# Patient Record
Sex: MALE | Race: WHITE | NOT HISPANIC OR LATINO | Employment: PART TIME | URBAN - METROPOLITAN AREA
[De-identification: names, ages, dates, MRNs, and addresses within clinical notes are randomized per-mention and may not be internally consistent; named-entity substitution may affect disease eponyms.]

---

## 2017-02-14 DIAGNOSIS — F41.1 GENERALIZED ANXIETY DISORDER: ICD-10-CM

## 2017-02-14 DIAGNOSIS — F40.10 SOCIAL PHOBIA: ICD-10-CM

## 2017-02-14 DIAGNOSIS — F41.1 ANXIETY STATE: ICD-10-CM

## 2017-02-15 NOTE — TELEPHONE ENCOUNTER
venlafaxine  Last Written Prescription Date: 9/1/2015  Last Fill Quantity: 180, # refills: 3  Last Office Visit with [facility], [facility] or [facility] prescribing provider: 9/1/2015        BP Readings from Last 3 Encounters:   09/01/15 134/90   01/11/13 104/70   01/10/13 130/84     Pulse: (for Fetzima)  Creatinine   Date Value Ref Range Status   12/06/2012 1.02 0.66 - 1.25 mg/dL Final   ]    Last PHQ-9 score on record=   PHQ-9 SCORE 9/1/2015   Total Score -   Total Score 4

## 2017-02-16 NOTE — TELEPHONE ENCOUNTER
Over a year since last seen in clinic. Patient should scheduled follow up appointment. Break in medication.  Nyla Hernández RN

## 2017-02-24 ENCOUNTER — OFFICE VISIT (OUTPATIENT)
Dept: FAMILY MEDICINE | Facility: CLINIC | Age: 42
End: 2017-02-24

## 2017-02-24 VITALS
OXYGEN SATURATION: 97 % | HEIGHT: 73 IN | SYSTOLIC BLOOD PRESSURE: 125 MMHG | WEIGHT: 315 LBS | TEMPERATURE: 97.2 F | DIASTOLIC BLOOD PRESSURE: 89 MMHG | RESPIRATION RATE: 20 BRPM | BODY MASS INDEX: 41.75 KG/M2 | HEART RATE: 88 BPM

## 2017-02-24 DIAGNOSIS — F40.10 SOCIAL PHOBIA: ICD-10-CM

## 2017-02-24 DIAGNOSIS — K21.9 GASTROESOPHAGEAL REFLUX DISEASE WITHOUT ESOPHAGITIS: Primary | ICD-10-CM

## 2017-02-24 DIAGNOSIS — F41.1 GENERALIZED ANXIETY DISORDER: ICD-10-CM

## 2017-02-24 PROCEDURE — 99213 OFFICE O/P EST LOW 20 MIN: CPT | Performed by: FAMILY MEDICINE

## 2017-02-24 RX ORDER — VENLAFAXINE 75 MG/1
TABLET ORAL
Qty: 180 TABLET | Refills: 0 | OUTPATIENT
Start: 2017-02-24

## 2017-02-24 RX ORDER — VENLAFAXINE 75 MG/1
75 TABLET ORAL 2 TIMES DAILY WITH MEALS
Qty: 180 TABLET | Refills: 1 | Status: SHIPPED | OUTPATIENT
Start: 2017-02-24 | End: 2018-02-06

## 2017-02-24 ASSESSMENT — ANXIETY QUESTIONNAIRES
7. FEELING AFRAID AS IF SOMETHING AWFUL MIGHT HAPPEN: NOT AT ALL
3. WORRYING TOO MUCH ABOUT DIFFERENT THINGS: NOT AT ALL
IF YOU CHECKED OFF ANY PROBLEMS ON THIS QUESTIONNAIRE, HOW DIFFICULT HAVE THESE PROBLEMS MADE IT FOR YOU TO DO YOUR WORK, TAKE CARE OF THINGS AT HOME, OR GET ALONG WITH OTHER PEOPLE: NOT DIFFICULT AT ALL
2. NOT BEING ABLE TO STOP OR CONTROL WORRYING: NOT AT ALL
1. FEELING NERVOUS, ANXIOUS, OR ON EDGE: NOT AT ALL
5. BEING SO RESTLESS THAT IT IS HARD TO SIT STILL: NOT AT ALL
6. BECOMING EASILY ANNOYED OR IRRITABLE: NOT AT ALL
GAD7 TOTAL SCORE: 0

## 2017-02-24 ASSESSMENT — PATIENT HEALTH QUESTIONNAIRE - PHQ9: 5. POOR APPETITE OR OVEREATING: NOT AT ALL

## 2017-02-24 NOTE — PROGRESS NOTES
SUBJECTIVE:                                                    Kev Gilbert is a 41 year old male who presents to clinic today for the following health issues:        Depression and Anxiety Follow-Up    Status since last visit: Improved     Other associated symptoms:None    Complicating factors:     Significant life event: Yes-  Loss of few friends      Current substance abuse: None    PHQ-9 SCORE 12/6/2012 9/1/2015 2/24/2017   Total Score 9 - -   Total Score - 4 2     MATTY-7 SCORE 12/6/2012 9/1/2015 2/24/2017   Total Score 4 - -   Total Score - 5 0        PHQ-9  English      PHQ-9   Any Language     GAD7       Amount of exercise or physical activity: None    Problems taking medications regularly: No    Medication side effects: none  Diet: regular (no restrictions)        Problem list and histories reviewed & adjusted, as indicated.  Additional history: as documented    Patient Active Problem List   Diagnosis     Anxiety state     GERD (gastroesophageal reflux disease)     CARDIOVASCULAR SCREENING; LDL GOAL LESS THAN 160     Smoker     Obesity     Generalized anxiety disorder     Social phobia     Past Surgical History   Procedure Laterality Date     Surgical history of -   7/00     Upper GI       Social History   Substance Use Topics     Smoking status: Current Every Day Smoker     Packs/day: 1.00     Years: 20.00     Types: Cigarettes     Smokeless tobacco: Never Used     Alcohol use No     Family History   Problem Relation Age of Onset     Neurologic Disorder Paternal Grandfather          Current Outpatient Prescriptions   Medication Sig Dispense Refill     venlafaxine (EFFEXOR) 75 MG tablet Take 1 tablet (75 mg) by mouth 2 times daily (with meals) 180 tablet 1     PRILOSEC 20 MG OR CPDR 1 TAB PO QD  otc 30 1     [DISCONTINUED] venlafaxine (EFFEXOR) 75 MG tablet Take 1 tablet (75 mg) by mouth 2 times daily (with meals) 180 tablet 3     Problem list, Medication list, Allergies, and Medical/Social/Surgical  "histories reviewed in EPIC and updated as appropriate.    ROS:  C: NEGATIVE for fever, chills, change in weight  CV: NEGATIVE for chest pain, palpitations or peripheral edema  NEURO: POSITIVE for dizziness/lightheadedness    OBJECTIVE:                                                    /89 (Cuff Size: Adult Large)  Pulse 88  Temp 97.2  F (36.2  C) (Tympanic)  Resp 20  Ht 6' 1\" (1.854 m)  Wt (!) 330 lb (149.7 kg)  SpO2 97%  BMI 43.54 kg/m2  Body mass index is 43.54 kg/(m^2).   GENERAL: healthy, alert, well nourished, well hydrated, no distress  NECK: no tenderness, no adenopathy, no asymmetry, no masses, no stiffness; thyroid- normal to palpation  RESP: lungs clear to auscultation - no rales, no rhonchi, no wheezes  CV: regular rates and rhythm, normal S1 S2, no S3 or S4 and no murmur, no click or rub -  PSYCH: Alert and oriented times 3; speech- coherent , normal rate and volume; able to articulate logical thoughts, able to abstract reason, no tangential thoughts, no hallucinations or delusions, affect- normal         ASSESSMENT/PLAN:                                                        ICD-10-CM    1. Gastroesophageal reflux disease without esophagitis K21.9    2. Social phobia F40.10 venlafaxine (EFFEXOR) 75 MG tablet   3. Generalized anxiety disorder F41.1        Patient has stable symptoms, no side effects. He has sendentry life style with smoking addiction. Dicussed healthy life style. Will refill the medication and he is to follow up in 6 months for physical and labs.  Effexor is refilled.    Jameson Mathews MD  [facility]  "

## 2017-02-24 NOTE — MR AVS SNAPSHOT
"              After Visit Summary   2017    Kev Gilbert    MRN: [id number]           Patient Information     Date Of Birth          1975        Visit Information        Provider Department      2017 3:00 PM Jameson Mathews MD [facility] [place]        Today's Diagnoses     Gastroesophageal reflux disease without esophagitis    -  1    Social phobia        Generalized anxiety disorder           Follow-ups after your visit        Who to contact     If you have questions or need follow up information about today's clinic visit or your schedule please contact [facility] [place] directly at [phone].  Normal or non-critical lab and imaging results will be communicated to you by [employer], letter or phone within 4 business days after the clinic has received the results. If you do not hear from us within 7 days, please contact the clinic through [employer] or phone. If you have a critical or abnormal lab result, we will notify you by phone as soon as possible.  Submit refill requests through [employer] or call your pharmacy and they will forward the refill request to us. Please allow 3 business days for your refill to be completed.          Additional Information About Your Visit        MyChart Information     [employer] lets you send messages to your doctor, view your test results, renew your prescriptions, schedule appointments and more. To sign up, go to [email or web address] . Click on \"Log in\" on the left side of the screen, which will take you to the Welcome page. Then click on \"Sign up Now\" on the right side of the page.     You will be asked to enter the access code listed below, as well as some personal information. Please follow the directions to create your username and password.     Your access code is: [email or web address]  Expires: 2017  3:41 PM     Your access code will  in 90 days. If you need help or a new code, please call your [facility] or [phone].        Care " "EveryWhere ID     This is your Care EveryWhere ID. This could be used by other organizations to access your [place] medical records  [id number]        Your Vitals Were     Pulse Temperature Respirations Height Pulse Oximetry BMI (Body Mass Index)    88 97.2  F (36.2  C) (Tympanic) 20 6' 1\" (1.854 m) 97% 43.54 kg/m2       Blood Pressure from Last 3 Encounters:   02/24/17 125/89   09/01/15 134/90   01/11/13 104/70    Weight from Last 3 Encounters:   02/24/17 (!) 330 lb (149.7 kg)   09/01/15 300 lb (136.1 kg)   01/10/13 291 lb (132 kg)              Today, you had the following     No orders found for display         Where to get your medicines      These medications were sent to [id number] [place] [address] [facility]  [address] [address]     Phone:  [phone]     venlafaxine 75 MG tablet          Primary Care Provider Office Phone # Fax #    Jameson Mathews [phone]       [facility] [address] [place] [address] [address]  [place] [address]        Thank you!     Thank you for choosing [facility]  for your care. Our goal is always to provide you with excellent care. Hearing back from our patients is one way we can continue to improve our services. Please take a few minutes to complete the written survey that you may receive in the mail after your visit with us. Thank you!             Your Updated Medication List - Protect others around you: Learn how to safely use, store and throw away your medicines at www.disposemymeds.org.          This list is accurate as of: 2/24/17  3:41 PM.  Always use your most recent med list.                   Brand Name Dispense Instructions for use    priLOSEC 20 MG CR capsule   Generic drug:  omeprazole     30    1 TAB PO QD  otc       venlafaxine 75 MG tablet    EFFEXOR    180 tablet    Take 1 tablet (75 mg) by mouth 2 times daily (with meals)         "

## 2017-02-25 ASSESSMENT — PATIENT HEALTH QUESTIONNAIRE - PHQ9: SUM OF ALL RESPONSES TO PHQ QUESTIONS 1-9: 2

## 2017-02-25 ASSESSMENT — ANXIETY QUESTIONNAIRES: GAD7 TOTAL SCORE: 0

## 2018-02-06 ENCOUNTER — OFFICE VISIT (OUTPATIENT)
Dept: FAMILY MEDICINE | Facility: CLINIC | Age: 43
End: 2018-02-06

## 2018-02-06 VITALS
TEMPERATURE: 97.2 F | BODY MASS INDEX: 44.33 KG/M2 | DIASTOLIC BLOOD PRESSURE: 92 MMHG | HEART RATE: 92 BPM | WEIGHT: 315 LBS | SYSTOLIC BLOOD PRESSURE: 138 MMHG

## 2018-02-06 DIAGNOSIS — F41.1 GENERALIZED ANXIETY DISORDER: Primary | ICD-10-CM

## 2018-02-06 DIAGNOSIS — F40.10 SOCIAL PHOBIA: ICD-10-CM

## 2018-02-06 DIAGNOSIS — R03.0 ELEVATED BLOOD PRESSURE READING WITHOUT DIAGNOSIS OF HYPERTENSION: ICD-10-CM

## 2018-02-06 PROCEDURE — 99213 OFFICE O/P EST LOW 20 MIN: CPT | Performed by: FAMILY MEDICINE

## 2018-02-06 RX ORDER — VENLAFAXINE 75 MG/1
75 TABLET ORAL 2 TIMES DAILY WITH MEALS
Qty: 180 TABLET | Refills: 1 | Status: SHIPPED | OUTPATIENT
Start: 2018-02-06 | End: 2019-02-05

## 2018-02-06 NOTE — NURSING NOTE
"Chief Complaint   Patient presents with     Recheck Medication       Initial BP (!) 146/94  Pulse 92  Temp 97.2  F (36.2  C) (Tympanic)  Wt (!) 336 lb (152.4 kg)  BMI 44.33 kg/m2 Estimated body mass index is 44.33 kg/(m^2) as calculated from the following:    Height as of 2/24/17: 6' 1\" (1.854 m).    Weight as of this encounter: 336 lb (152.4 kg).  Medication Reconciliation: complete    Current Outpatient Prescriptions   Medication Sig Dispense Refill     venlafaxine (EFFEXOR) 75 MG tablet Take 1 tablet (75 mg) by mouth 2 times daily (with meals) 180 tablet 1     PRILOSEC 20 MG OR CPDR 1 TAB PO QD  otc 30 1       [facility], [facility]  "

## 2018-02-06 NOTE — MR AVS SNAPSHOT
After Visit Summary   2/6/2018    Kev Gilbert    MRN: [id number]           Patient Information     Date Of Birth          1975        Visit Information        Provider Department      2/6/2018 3:40 PM Jameson Mathews MD [facility]        Today's Diagnoses     Generalized anxiety disorder    -  1    Social phobia        Elevated blood pressure reading without diagnosis of hypertension           Follow-ups after your visit        Follow-up notes from your care team     Return in about 2 months (around 4/6/2018) for BP check morning appointment. alos physcial.      Your next 10 appointments already scheduled     Apr 18, 2018  9:00 AM CDT   [facility] Visit with [facility]   [facility]    [address] [place] [id number] [phone]           Bring a current list of meds and any records pertaining to this visit. For Physicals, please bring immunization records and any forms needing to be filled out. Please arrive 10 minutes early to complete paperwork.              Who to contact     If you have questions or need follow up information about today's clinic visit or your schedule please contact [facility] directly at [phone].  Normal or non-critical lab and imaging results will be communicated to you by [email or web address], letter or phone within 4 business days after the clinic has received the results. If you do not hear from us within 7 days, please contact the clinic through [email or web address] or phone. If you have a critical or abnormal lab result, we will notify you by phone as soon as possible.  Submit refill requests through [employer] or call your pharmacy and they will forward the refill request to us. Please allow 3 business days for your refill to be completed.          Additional Information About Your Visit        [email or web address] Information     [employer] lets you send messages to your doctor, view your test  "results, renew your prescriptions, schedule appointments and more. To sign up, go to [email or web address] . Click on \"Log in\" on the left side of the screen, which will take you to the Welcome page. Then click on \"Sign up Now\" on the right side of the page.     You will be asked to enter the access code listed below, as well as some personal information. Please follow the directions to create your username and password.     Your access code is: [email or web address]  Expires: 2018  4:00 PM     Your access code will  in 90 days. If you need help or a new code, please call your [place] clinic or [phone].        Care EveryWhere ID     This is your Care EveryWhere ID. This could be used by other organizations to access your [place] medical records  [id number]        Your Vitals Were     Pulse Temperature BMI (Body Mass Index)             92 97.2  F (36.2  C) (Tympanic) 44.33 kg/m2          Blood Pressure from Last 3 Encounters:   18 (!) 138/92   17 125/89   09/01/15 134/90    Weight from Last 3 Encounters:   18 (!) 336 lb (152.4 kg)   17 (!) 330 lb (149.7 kg)   09/01/15 300 lb (136.1 kg)              Today, you had the following     No orders found for display         Where to get your medicines      These medications were sent to [id number]  [place]  [address] [facility]  [address] [address]     Phone:  [phone]     venlafaxine 75 MG tablet          Primary Care Provider Office Phone # Fax #    Jameson Mathews [phone]       [address]  [place] [address]        Equal Access to Services     [facility] : Tiffanie hall Soheraclio, waboda jimbo, josee marie. So [facility] [phone].    ATENCIÓN: Si habla español, tiene a hernandez disposición servicios gratuitos de asistencia lingüística. Llame al [phone].    We comply with " applicable federal civil rights laws and [place] laws. We do not discriminate on the basis of race, color, national origin, age, disability, sex, sexual orientation, or gender identity.            Thank you!     Thank you for choosing [facility] [place]  for your care. Our goal is always to provide you with excellent care. Hearing back from our patients is one way we can continue to improve our services. Please take a few minutes to complete the written survey that you may receive in the mail after your visit with us. Thank you!             Your Updated Medication List - Protect others around you: Learn how to safely use, store and throw away your medicines at www.disposemymeds.org.          This list is accurate as of 2/6/18  4:00 PM.  Always use your most recent med list.                   Brand Name Dispense Instructions for use Diagnosis    priLOSEC 20 MG CR capsule   Generic drug:  omeprazole     30    1 TAB PO QD  otc        venlafaxine 75 MG tablet    EFFEXOR    180 tablet    Take 1 tablet (75 mg) by mouth 2 times daily (with meals)    Social phobia

## 2018-02-06 NOTE — PROGRESS NOTES
SUBJECTIVE:   Kev Gilbert is a 42 year old male who presents to clinic today for the following health issues:      Depression and Anxiety Follow-Up    Status since last visit: Improved would like to continue medications    Other associated symptoms:None    Complicating factors:     Significant life event: No     Current substance abuse: None    PHQ-9 9/1/2015 2/24/2017   Total Score 4 2   Q9: Suicide Ideation Not at all Not at all     MATTY-7 SCORE 12/6/2012 9/1/2015 2/24/2017   Total Score 4 - -   Total Score - 5 0       PHQ-9  English  PHQ-9   Any Language  MATTY-7  Suicide Assessment Five-step Evaluation and Treatment (SAFE-T)    Amount of exercise or physical activity: None    Problems taking medications regularly: No    Medication side effects: none    Diet: regular (no restrictions)      Patient Active Problem List   Diagnosis     Anxiety state     GERD (gastroesophageal reflux disease)     CARDIOVASCULAR SCREENING; LDL GOAL LESS THAN 160     Smoker     Obesity     Generalized anxiety disorder     Social phobia     Past Surgical History:   Procedure Laterality Date     SURGICAL HISTORY OF -   7/00    Upper GI       Social History   Substance Use Topics     Smoking status: Current Every Day Smoker     Packs/day: 1.00     Years: 20.00     Types: Cigarettes     Smokeless tobacco: Never Used     Alcohol use No     Family History   Problem Relation Age of Onset     Neurologic Disorder Paternal Grandfather          Current Outpatient Prescriptions   Medication Sig Dispense Refill     venlafaxine (EFFEXOR) 75 MG tablet Take 1 tablet (75 mg) by mouth 2 times daily (with meals) 180 tablet 1     PRILOSEC 20 MG OR CPDR 1 TAB PO QD  otc 30 1     [DISCONTINUED] venlafaxine (EFFEXOR) 75 MG tablet Take 1 tablet (75 mg) by mouth 2 times daily (with meals) 180 tablet 1       Reviewed and updated as needed this visit by clinical staff  Tobacco  Allergies  Meds       Reviewed and updated as needed this visit by Provider          ROS:  C: NEGATIVE for fever, chills, change in weight  E/M: NEGATIVE for ear, mouth and throat problems  R: NEGATIVE for significant cough or SOB  CV: NEGATIVE for chest pain, palpitations or peripheral edema    OBJECTIVE:                                                    BP (!) 146/94  Pulse 92  Temp 97.2  F (36.2  C) (Tympanic)  Wt (!) 336 lb (152.4 kg)  BMI 44.33 kg/m2  Body mass index is 44.33 kg/(m^2).   GENERAL: healthy, alert, well nourished, well hydrated, no distress  HENT: ear canals- normal; TMs- normal; Nose- normal; Mouth- no ulcers, no lesions  RESP: lungs clear to auscultation - no rales, no rhonchi, no wheezes  CV: regular rates and rhythm, normal S1 S2, no S3 or S4 and no murmur, no click or rub -  PSYCH: Alert and oriented times 3; speech- coherent , normal rate and volume; able to articulate logical thoughts, able to abstract reason, no tangential thoughts, no hallucinations or delusions, affect- normal         ASSESSMENT/PLAN:                                                        ICD-10-CM    1. Generalized anxiety disorder F41.1    2. Social phobia F40.10 venlafaxine (EFFEXOR) 75 MG tablet   3. Elevated blood pressure reading without diagnosis of hypertension R03.0        Patient and her depression symptoms are much better.  He would like to continue Effexor.  Medication refill.   Patient is elevated blood pressure without diagnosis of hypertension.  I will suggest patient continue diet and exercise.  Follow-up in 2-3 months if BP will continue to be high we need to discuss BP  medication.  Discussed healthy diet.      Jameson Mathews MD  [facility]

## 2018-02-27 ENCOUNTER — OFFICE VISIT (OUTPATIENT)
Dept: FAMILY MEDICINE | Facility: CLINIC | Age: 43
End: 2018-02-27

## 2018-02-27 VITALS
HEIGHT: 73 IN | OXYGEN SATURATION: 100 % | SYSTOLIC BLOOD PRESSURE: 118 MMHG | BODY MASS INDEX: 41.75 KG/M2 | TEMPERATURE: 97.2 F | DIASTOLIC BLOOD PRESSURE: 80 MMHG | WEIGHT: 315 LBS | RESPIRATION RATE: 16 BRPM | HEART RATE: 103 BPM

## 2018-02-27 DIAGNOSIS — M62.830 BACK MUSCLE SPASM: Primary | ICD-10-CM

## 2018-02-27 PROCEDURE — 99214 OFFICE O/P EST MOD 30 MIN: CPT | Mod: 25 | Performed by: FAMILY MEDICINE

## 2018-02-27 PROCEDURE — 96372 THER/PROPH/DIAG INJ SC/IM: CPT | Performed by: FAMILY MEDICINE

## 2018-02-27 RX ORDER — CYCLOBENZAPRINE HCL 10 MG
10 TABLET ORAL 2 TIMES DAILY PRN
Qty: 60 TABLET | Refills: 0 | Status: SHIPPED | OUTPATIENT
Start: 2018-02-27 | End: 2018-07-13

## 2018-02-27 RX ORDER — PREDNISONE 20 MG/1
TABLET ORAL
Qty: 20 TABLET | Refills: 0 | Status: SHIPPED | OUTPATIENT
Start: 2018-02-27 | End: 2018-05-09

## 2018-02-27 RX ORDER — HYDROCODONE BITARTRATE AND ACETAMINOPHEN 5; 325 MG/1; MG/1
1 TABLET ORAL EVERY 6 HOURS PRN
Qty: 20 TABLET | Refills: 0 | Status: SHIPPED | OUTPATIENT
Start: 2018-02-27 | End: 2020-09-16

## 2018-02-27 RX ORDER — KETOROLAC TROMETHAMINE 30 MG/ML
60 INJECTION, SOLUTION INTRAMUSCULAR; INTRAVENOUS ONCE
Qty: 2 ML | Refills: 0 | OUTPATIENT
Start: 2018-02-27 | End: 2018-02-27

## 2018-02-27 NOTE — PROGRESS NOTES
SUBJECTIVE:   Kev Gilbert is a 42 year old male who presents to clinic today for the following health issues:      ED/UC Followup:    Facility:  [facility]   Date of visit: 2/18/17  Reason for visit: back pain   Current Status: not better after taking Prednisone, Norco and Flexeril        Problem list and histories reviewed & adjusted, as indicated.  Additional history: as documented    Patient Active Problem List   Diagnosis     Anxiety state     GERD (gastroesophageal reflux disease)     CARDIOVASCULAR SCREENING; LDL GOAL LESS THAN 160     Smoker     Obesity     Generalized anxiety disorder     Social phobia     Past Surgical History:   Procedure Laterality Date     SURGICAL HISTORY OF -   7/00    Upper GI       Social History   Substance Use Topics     Smoking status: Current Every Day Smoker     Packs/day: 1.00     Years: 20.00     Types: Cigarettes     Smokeless tobacco: Never Used     Alcohol use No     Family History   Problem Relation Age of Onset     Neurologic Disorder Paternal Grandfather          Current Outpatient Prescriptions   Medication Sig Dispense Refill     HYDROcodone-acetaminophen (NORCO) 5-325 MG per tablet Take 1 tablet by mouth every 6 hours as needed for pain maximum 4 tablet(s) per day 20 tablet 0     cyclobenzaprine (FLEXERIL) 10 MG tablet Take 1 tablet (10 mg) by mouth 2 times daily as needed for muscle spasms 60 tablet 0     predniSONE (DELTASONE) 20 MG tablet Take 3 tabs (60 mg) by mouth daily x 3 days, 2 tabs (40 mg) daily x 3 days, 1 tab (20 mg) daily x 3 days, then 1/2 tab (10 mg) x 3 days. 20 tablet 0     venlafaxine (EFFEXOR) 75 MG tablet Take 1 tablet (75 mg) by mouth 2 times daily (with meals) 180 tablet 1     PRILOSEC 20 MG OR CPDR 1 TAB PO QD  otc 30 1     Allergies   Allergen Reactions     No Known Drug Allergies      Recent Labs   Lab Test  01/18/13   1026  01/11/13   1030  12/06/12   0947   LDL   --    --   115   ALT  68  121*   --    CR   --    --   1.02   GFRESTIMATED    "--    --   81   GFRESTBLACK   --    --   >90   POTASSIUM   --    --   3.9      BP Readings from Last 3 Encounters:   02/27/18 118/80   02/06/18 (!) 138/92   02/24/17 125/89    Wt Readings from Last 3 Encounters:   02/27/18 (!) 326 lb (147.9 kg)   02/06/18 (!) 336 lb (152.4 kg)   02/24/17 (!) 330 lb (149.7 kg)                    Reviewed and updated as needed this visit by clinical staff       Reviewed and updated as needed this visit by Provider         ROS:  Constitutional, HEENT, cardiovascular, pulmonary, gi and gu systems are negative, except as otherwise noted.    OBJECTIVE:     /80 (Cuff Size: Adult Large)  Pulse 103  Temp 97.2  F (36.2  C) (Tympanic)  Resp 16  Ht 6' 1\" (1.854 m)  Wt (!) 326 lb (147.9 kg)  SpO2 100%  BMI 43.01 kg/m2  Body mass index is 43.01 kg/(m^2).  GENERAL: healthy, alert and no distress  NECK: no adenopathy, no asymmetry, masses, or scars and thyroid normal to palpation  RESP: lungs clear to auscultation - no rales, rhonchi or wheezes  CV: regular rate and rhythm, normal S1 S2, no S3 or S4, no murmur, click or rub, no peripheral edema and peripheral pulses strong  ABDOMEN: soft, nontender, no hepatosplenomegaly, no masses and bowel sounds normal  MS: no gross musculoskeletal defects noted, no edema        ASSESSMENT/PLAN:   Kev was seen today for back pain.    Diagnoses and all orders for this visit:    Back muscle spasm  -     HYDROcodone-acetaminophen (NORCO) 5-325 MG per tablet; Take 1 tablet by mouth every 6 hours as needed for pain maximum 4 tablet(s) per day  -     cyclobenzaprine (FLEXERIL) 10 MG tablet; Take 1 tablet (10 mg) by mouth 2 times daily as needed for muscle spasms  -     predniSONE (DELTASONE) 20 MG tablet; Take 3 tabs (60 mg) by mouth daily x 3 days, 2 tabs (40 mg) daily x 3 days, 1 tab (20 mg) daily x 3 days, then 1/2 tab (10 mg) x 3 days.  -     [facility] PT, HAND, AND CHIROPRACTIC REFERRAL  -     ketorolac (TORADOL) 60 MG/2ML SOLN injection; Inject 2 mLs " (60 mg) into the muscle once for 1 dose      Was seen at ER and had prednisone for 5 days with muscle relaxant, which not helped  Will have him to try longer course of prednisone and muscle relaxant, also will refer him to PT      Attila Rodriguez MD  [facility]

## 2018-02-27 NOTE — NURSING NOTE
"Chief Complaint   Patient presents with     Back Pain       Initial /80 (Cuff Size: Adult Large)  Pulse 103  Temp 97.2  F (36.2  C) (Tympanic)  Resp 16  Ht 6' 1\" (1.854 m)  Wt (!) 326 lb (147.9 kg)  SpO2 100%  BMI 43.01 kg/m2 Estimated body mass index is 43.01 kg/(m^2) as calculated from the following:    Height as of this encounter: 6' 1\" (1.854 m).    Weight as of this encounter: 326 lb (147.9 kg).  Medication Reconciliation: complete   Brittny Regan, CMA    "

## 2018-02-27 NOTE — MR AVS SNAPSHOT
After Visit Summary   2/27/2018    Kev Gilbert    MRN: [id number]           Patient Information     Date Of Birth          1975        Visit Information        Provider Department      2/27/2018 1:40 PM Attila Rodriguez MD [facility] [place]        Today's Diagnoses     Back muscle spasm    -  1       Follow-ups after your visit        Additional Services     [facility]ISA PT, HAND, AND CHIROPRACTIC REFERRAL       **This order will print in the [facility] Scheduling Office**    Physical Therapy, Hand Therapy and Chiropractic Care are available through:    *[facility]  *[facility]  *[facility]    Call one number to schedule at any of the above locations: [phone].    Your provider has referred you to: Physical Therapy at [facility] or [facility]    Indication/Reason for Referral: low back pain  Onset of Illness: acute  Therapy Orders: Evaluate and Treat  Special Programs: None  Special Request: None    Indira Jacobson      Additional Comments for the Therapist or Chiropractor: none     Please be aware that coverage of these services is subject to the terms and limitations of your health insurance plan.  Call member services at your health plan with any benefit or coverage questions.      Please bring the following to your appointment:    *Your personal calendar for scheduling future appointments  *Comfortable clothing                  Follow-up notes from your care team     Return if symptoms worsen or fail to improve.      Your next 10 appointments already scheduled     Apr 18, 2018  9:00 AM CDT   Office Visit with Jameson Mathews MD   [facility] [place] ([facility])    [address] [place] [address] [phone]           Bring a current list of meds and any records pertaining to this visit. For Physicals, please bring immunization records and any forms needing to be filled out. Please arrive 10 minutes early  "to complete paperwork.              Who to contact     If you have questions or need follow up information about today's clinic visit or your schedule please contact [facility] [place] directly at [phone].  Normal or non-critical lab and imaging results will be communicated to you by MyChart, letter or phone within 4 business days after the clinic has received the results. If you do not hear from us within 7 days, please contact the clinic through MyChart or phone. If you have a critical or abnormal lab result, we will notify you by phone as soon as possible.  Submit refill requests through [employer] or call your pharmacy and they will forward the refill request to us. Please allow 3 business days for your refill to be completed.          Additional Information About Your Visit        EvntLivehar2sms Information     [employer] lets you send messages to your doctor, view your test results, renew your prescriptions, schedule appointments and more. To sign up, go to [email or web address] . Click on \"Log in\" on the left side of the screen, which will take you to the Welcome page. Then click on \"Sign up Now\" on the right side of the page.     You will be asked to enter the access code listed below, as well as some personal information. Please follow the directions to create your username and password.     Your access code is: [email or web address]  Expires: 2018  4:00 PM     Your access code will  in 90 days. If you need help or a new code, please call your [place] clinic or [phone].        Care EveryWhere ID     This is your Care EveryWhere ID. This could be used by other organizations to access your [place] medical records  [id number]        Your Vitals Were     Pulse Temperature Respirations Height Pulse Oximetry BMI (Body Mass Index)    103 97.2  F (36.2  C) (Tympanic) 16 6' 1\" (1.854 m) 100% 43.01 kg/m2       Blood Pressure from Last 3 Encounters:   18 118/80   18 (!) 138/92   17 " 125/89    Weight from Last 3 Encounters:   02/27/18 (!) 326 lb (147.9 kg)   02/06/18 (!) 336 lb (152.4 kg)   02/24/17 (!) 330 lb (149.7 kg)              We Performed the Following     MONALISA PT, HAND, AND CHIROPRACTIC REFERRAL     KETOROLAC (TORADOL) 15 MG          Today's Medication Changes          These changes are accurate as of 2/27/18  2:26 PM.  If you have any questions, ask your nurse or doctor.               Start taking these medicines.        Dose/Directions    cyclobenzaprine 10 MG tablet   Commonly known as:  FLEXERIL   Used for:  Back muscle spasm   Started by:  Attila Rodriguez MD        Dose:  10 mg   Take 1 tablet (10 mg) by mouth 2 times daily as needed for muscle spasms   Quantity:  60 tablet   Refills:  0       HYDROcodone-acetaminophen 5-325 MG per tablet   Commonly known as:  NORCO   Used for:  Back muscle spasm   Started by:  Attila Rodriguez MD        Dose:  1 tablet   Take 1 tablet by mouth every 6 hours as needed for pain maximum 4 tablet(s) per day   Quantity:  20 tablet   Refills:  0       ketorolac 60 MG/2ML Soln injection   Commonly known as:  TORADOL   Used for:  Back muscle spasm   Started by:  Attila Rodriguez MD        Dose:  60 mg   Inject 2 mLs (60 mg) into the muscle once for 1 dose   Quantity:  2 mL   Refills:  0       predniSONE 20 MG tablet   Commonly known as:  DELTASONE   Used for:  Back muscle spasm   Started by:  Attila Rodriguez MD        Take 3 tabs (60 mg) by mouth daily x 3 days, 2 tabs (40 mg) daily x 3 days, 1 tab (20 mg) daily x 3 days, then 1/2 tab (10 mg) x 3 days.   Quantity:  20 tablet   Refills:  0            Where to get your medicines      These medications were sent to [facility] [place] - [address]  [address] [place] [address]     Phone:  [phone]     cyclobenzaprine 10 MG tablet    predniSONE 20 MG tablet         Some of these will need a paper prescription and others can be bought over the counter.  Ask  your nurse if you have questions.     Bring a paper prescription for each of these medications     HYDROcodone-acetaminophen 5-325 MG per tablet       You don't need a prescription for these medications     ketorolac 60 MG/2ML Soln injection                Primary Care Provider Office Phone # Fax #    Jameson Mathwes [phone]       [address]  [place] [address]        Equal Access to Services     [facility] : Hadii aad ku hadasho Soomaali, waaxda luqadaha, qaybta kaalmada adeegyada, waxay idiin hayaan adeeg kharash la'aan . So [facility] [phone].    ATENCIÓN: Si habla espze, tiene a hernandez disposición servicios gratuitos de asistencia lingüística. Llame al [phone].    We comply with applicable federal civil rights laws and [place] laws. We do not discriminate on the basis of race, color, national origin, age, disability, sex, sexual orientation, or gender identity.            Thank you!     Thank you for choosing [facility] [place]  for your care. Our goal is always to provide you with excellent care. Hearing back from our patients is one way we can continue to improve our services. Please take a few minutes to complete the written survey that you may receive in the mail after your visit with us. Thank you!             Your Updated Medication List - Protect others around you: Learn how to safely use, store and throw away your medicines at www.disposemymeds.org.          This list is accurate as of 2/27/18  2:26 PM.  Always use your most recent med list.                   Brand Name Dispense Instructions for use Diagnosis    cyclobenzaprine 10 MG tablet    FLEXERIL    60 tablet    Take 1 tablet (10 mg) by mouth 2 times daily as needed for muscle spasms    Back muscle spasm       HYDROcodone-acetaminophen 5-325 MG per tablet    NORCO    20 tablet    Take 1 tablet by mouth every 6 hours as needed for pain maximum 4 tablet(s) per day    Back muscle spasm       ketorolac  60 MG/2ML Soln injection    TORADOL    2 mL    Inject 2 mLs (60 mg) into the muscle once for 1 dose    Back muscle spasm       predniSONE 20 MG tablet    DELTASONE    20 tablet    Take 3 tabs (60 mg) by mouth daily x 3 days, 2 tabs (40 mg) daily x 3 days, 1 tab (20 mg) daily x 3 days, then 1/2 tab (10 mg) x 3 days.    Back muscle spasm       priLOSEC 20 MG CR capsule   Generic drug:  omeprazole     30    1 TAB PO QD  otc        venlafaxine 75 MG tablet    EFFEXOR    180 tablet    Take 1 tablet (75 mg) by mouth 2 times daily (with meals)    Social phobia

## 2018-03-10 ENCOUNTER — NURSE TRIAGE (OUTPATIENT)
Dept: NURSING | Facility: CLINIC | Age: 43
End: 2018-03-10

## 2018-03-10 DIAGNOSIS — M62.830 BACK MUSCLE SPASM: ICD-10-CM

## 2018-03-10 NOTE — TELEPHONE ENCOUNTER
Pt was seen in clinic on 2/27/18 for back spasm and he was started on the following medications.  He reports he needs refills.  HYDROcodone-acetaminophen (NORCO) 5-325 MG per tablet; Take 1 tablet by mouth every 6 hours as needed for pain maximum 4 tablet(s) per day  -     cyclobenzaprine (FLEXERIL) 10 MG tablet; Take 1 tablet (10 mg) by mouth 2 times daily as needed for muscle spasms  -     predniSONE (DELTASONE) 20 MG tablet; Take 3 tabs (60 mg) by mouth daily x 3 days, 2 tabs (40 mg) daily x 3 days, 1 tab (20 mg) daily x 3 days, then 1/2 tab (10 mg) x 3 days.  -     MONALISA PT, HAND, AND CHIROPRACTIC REFERRAL    Instructed pt that these medications can not be filled on weekends but I would message the clinic and if his pain is not under control he should go to an U/C.  Pt agreed to this plan and message sent to clinic.

## 2018-03-10 NOTE — TELEPHONE ENCOUNTER
Pt was seen in the clinic on 2/27/18 by Dr. Rodriguez for back spasm.  He reports he needs refills on the following prescriptions:  HYDROcodone-acetaminophen (NORCO) 5-325 MG per tablet; Take 1 tablet by mouth every 6 hours as needed for pain maximum 4 tablet(s) per day  -     cyclobenzaprine (FLEXERIL) 10 MG tablet; Take 1 tablet (10 mg) by mouth 2 times daily as needed for muscle spasms  -     predniSONE (DELTASONE) 20 MG tablet; Take 3 tabs (60 mg) by mouth daily x 3 days, 2 tabs (40 mg) daily x 3 days, 1 tab (20 mg) daily x 3 days, then 1/2 tab (10 mg) x 3 days.  -     MONALISA PT, HAND, AND CHIROPRACTIC REFERRAL    Instructed pt that I would send a message to the clinic requesting they call him on Monday 3/12/18 at [phone] and if his pain is not controlled this weekend he should go to an U/C and he agreed to this plan and understood.  Namrata Riggs MA RN, [facility]

## 2018-03-26 RX ORDER — PREDNISONE 20 MG/1
TABLET ORAL
Qty: 20 TABLET | Refills: 0 | Status: CANCELLED | OUTPATIENT
Start: 2018-03-26

## 2018-03-26 RX ORDER — CYCLOBENZAPRINE HCL 10 MG
10 TABLET ORAL 2 TIMES DAILY PRN
Qty: 60 TABLET | Refills: 0 | Status: CANCELLED | OUTPATIENT
Start: 2018-03-26

## 2018-03-26 RX ORDER — HYDROCODONE BITARTRATE AND ACETAMINOPHEN 5; 325 MG/1; MG/1
1 TABLET ORAL EVERY 6 HOURS PRN
Qty: 20 TABLET | Refills: 0 | Status: CANCELLED | OUTPATIENT
Start: 2018-03-26

## 2018-03-26 NOTE — TELEPHONE ENCOUNTER
Encounter just sent to triage today.     flexeril      Last Written Prescription Date:  2/27/18  Last Fill Quantity: 60,   # refills: 0  Last Office Visit: 2/27/18  Future Office visit:    Next 5 appointments (look out 90 days)     Apr 18, 2018  9:00 AM CDT   Office Visit with Jameson Mathews MD   [facility] ([address] [address]   [phone]                   Routing refill request to provider for review/approval because:  Drug not on the [facility], [facility] or [facility] refill protocol or controlled substance    prednisone      Last Written Prescription Date:  2/27/18  Last Fill Quantity: 20,   # refills: 0  Last Office Visit: 2/27/18  Future Office visit:    Next 5 appointments (look out 90 days)     Apr 18, 2018  9:00 AM CDT   Office Visit with Jameson Mathews MD   [facility] ([address] [address]   [phone]                   Routing refill request to provider for review/approval because:  Drug not on the [facility], [facility] or [facility] refill protocol or controlled substance        Controlled Substance Refill Request for norco  Problem List Complete:  No     PROVIDER TO CONSIDER COMPLETION OF PROBLEM LIST AND OVERVIEW/CONTROLLED SUBSTANCE AGREEMENT    Last Written Prescription Date:  2/27/18  Last Fill Quantity: 20,   # refills: 0    Last Office Visit with [facility] primary care provider: 2/27/18    Future Office visit:   Next 5 appointments (look out 90 days)     Apr 18, 2018  9:00 AM CDT   Office Visit with Jameson Mathews MD   [facility] ([address] [address]   [phone]                  Controlled substance agreement on file: No.     Processing:  Patient will  in clinic   checked in past 6 months?  No, route to RN     [place] reviewed:  reviewed- no  concerns    [place]:  [email or web address]    Krista Rodriguez RN   [facility] - Triage

## 2018-03-26 NOTE — TELEPHONE ENCOUNTER
Since this request was made two weeks ago is the patient still requesting these medications be filled? Regardless, he will need to be seen for this request as this was for an acute problem and these are not long term medications.

## 2018-05-09 ENCOUNTER — OFFICE VISIT (OUTPATIENT)
Dept: FAMILY MEDICINE | Facility: CLINIC | Age: 43
End: 2018-05-09

## 2018-05-09 VITALS
TEMPERATURE: 98.4 F | BODY MASS INDEX: 41.48 KG/M2 | OXYGEN SATURATION: 99 % | DIASTOLIC BLOOD PRESSURE: 85 MMHG | HEIGHT: 73 IN | RESPIRATION RATE: 18 BRPM | HEART RATE: 100 BPM | WEIGHT: 313 LBS | SYSTOLIC BLOOD PRESSURE: 119 MMHG

## 2018-05-09 DIAGNOSIS — K21.9 GASTROESOPHAGEAL REFLUX DISEASE WITHOUT ESOPHAGITIS: ICD-10-CM

## 2018-05-09 DIAGNOSIS — R11.2 NAUSEA AND VOMITING, INTRACTABILITY OF VOMITING NOT SPECIFIED, UNSPECIFIED VOMITING TYPE: ICD-10-CM

## 2018-05-09 DIAGNOSIS — R10.84 ABDOMINAL PAIN, GENERALIZED: Primary | ICD-10-CM

## 2018-05-09 LAB
ERYTHROCYTE [DISTWIDTH] IN BLOOD BY AUTOMATED COUNT: 13.1 % (ref 10–15)
HCT VFR BLD AUTO: 50.6 % (ref 40–53)
HGB BLD-MCNC: 16.7 G/DL (ref 13.3–17.7)
LDH SERPL L TO P-CCNC: 169 U/L (ref 85–227)
LIPASE SERPL-CCNC: 51 U/L (ref 73–393)
MCH RBC QN AUTO: 30.2 PG (ref 26.5–33)
MCHC RBC AUTO-ENTMCNC: 33 G/DL (ref 31.5–36.5)
MCV RBC AUTO: 92 FL (ref 78–100)
PLATELET # BLD AUTO: 221 10E9/L (ref 150–450)
RBC # BLD AUTO: 5.53 10E12/L (ref 4.4–5.9)
WBC # BLD AUTO: 12.4 10E9/L (ref 4–11)

## 2018-05-09 PROCEDURE — 99214 OFFICE O/P EST MOD 30 MIN: CPT | Performed by: FAMILY MEDICINE

## 2018-05-09 PROCEDURE — 85027 COMPLETE CBC AUTOMATED: CPT | Performed by: FAMILY MEDICINE

## 2018-05-09 PROCEDURE — 80053 COMPREHEN METABOLIC PANEL: CPT | Performed by: FAMILY MEDICINE

## 2018-05-09 PROCEDURE — 84439 ASSAY OF FREE THYROXINE: CPT | Performed by: FAMILY MEDICINE

## 2018-05-09 PROCEDURE — 82550 ASSAY OF CK (CPK): CPT | Performed by: FAMILY MEDICINE

## 2018-05-09 PROCEDURE — 83690 ASSAY OF LIPASE: CPT | Performed by: FAMILY MEDICINE

## 2018-05-09 PROCEDURE — 36415 COLL VENOUS BLD VENIPUNCTURE: CPT | Performed by: FAMILY MEDICINE

## 2018-05-09 PROCEDURE — 83615 LACTATE (LD) (LDH) ENZYME: CPT | Performed by: FAMILY MEDICINE

## 2018-05-09 PROCEDURE — 84443 ASSAY THYROID STIM HORMONE: CPT | Performed by: FAMILY MEDICINE

## 2018-05-09 RX ORDER — ONDANSETRON 8 MG/1
8 TABLET, FILM COATED ORAL EVERY 8 HOURS PRN
Qty: 20 TABLET | Refills: 1 | Status: SHIPPED | OUTPATIENT
Start: 2018-05-09 | End: 2020-09-16

## 2018-05-09 ASSESSMENT — ANXIETY QUESTIONNAIRES
7. FEELING AFRAID AS IF SOMETHING AWFUL MIGHT HAPPEN: NOT AT ALL
GAD7 TOTAL SCORE: 3
3. WORRYING TOO MUCH ABOUT DIFFERENT THINGS: SEVERAL DAYS
IF YOU CHECKED OFF ANY PROBLEMS ON THIS QUESTIONNAIRE, HOW DIFFICULT HAVE THESE PROBLEMS MADE IT FOR YOU TO DO YOUR WORK, TAKE CARE OF THINGS AT HOME, OR GET ALONG WITH OTHER PEOPLE: SOMEWHAT DIFFICULT
2. NOT BEING ABLE TO STOP OR CONTROL WORRYING: NOT AT ALL
1. FEELING NERVOUS, ANXIOUS, OR ON EDGE: SEVERAL DAYS
5. BEING SO RESTLESS THAT IT IS HARD TO SIT STILL: NOT AT ALL
6. BECOMING EASILY ANNOYED OR IRRITABLE: NOT AT ALL

## 2018-05-09 ASSESSMENT — PATIENT HEALTH QUESTIONNAIRE - PHQ9: 5. POOR APPETITE OR OVEREATING: SEVERAL DAYS

## 2018-05-09 NOTE — MR AVS SNAPSHOT
"              After Visit Summary   5/9/2018    Kev Gilbert    MRN: [id number]           Patient Information     Date Of Birth          1975        Visit Information        Provider Department      5/9/2018 10:40 AM Attila Rodriguez MD [facility]        Today's Diagnoses     Abdominal pain, generalized    -  1    Nausea and vomiting, intractability of vomiting not specified, unspecified vomiting type        Gastroesophageal reflux disease without esophagitis           Follow-ups after your visit        Follow-up notes from your care team     Return in about 6 months (around 11/9/2018) for Physical Exam.      Future tests that were ordered for you today     Open Future Orders        Priority Expected Expires Ordered    US Abdomen Complete Routine  5/9/2019 5/9/2018    Enteric Bacteria and Virus Panel by OLAYINKA Stool Routine  5/9/2019 5/9/2018            Who to contact     If you have questions or need follow up information about today's clinic visit or your schedule please contact [facility] directly at [phone].  Normal or non-critical lab and imaging results will be communicated to you by Mondecahart, letter or phone within 4 business days after the clinic has received the results. If you do not hear from us within 7 days, please contact the clinic through [employer] or phone. If you have a critical or abnormal lab result, we will notify you by phone as soon as possible.  Submit refill requests through [employer] or call your pharmacy and they will forward the refill request to us. Please allow 3 business days for your refill to be completed.          Additional Information About Your Visit        Mondecahart Information     [employer] lets you send messages to your doctor, view your test results, renew your prescriptions, schedule appointments and more. To sign up, go to [email or web address] . Click on \"Log in\" on the left side of the screen, which will take you to the Welcome page. " "Then click on \"Sign up Now\" on the right side of the page.     You will be asked to enter the access code listed below, as well as some personal information. Please follow the directions to create your username and password.     Your access code is: [email or web address]  Expires: 2018 11:00 AM     Your access code will  in 90 days. If you need help or a new code, please call your [place] clinic or [phone].        Care EveryWhere ID     This is your Care EveryWhere ID. This could be used by other organizations to access your [place] medical records  [id number]        Your Vitals Were     Pulse Temperature Respirations Height Pulse Oximetry BMI (Body Mass Index)    100 98.4  F (36.9  C) (Tympanic) 18 6' 1\" (1.854 m) 99% 41.3 kg/m2       Blood Pressure from Last 3 Encounters:   18 119/85   18 118/80   18 (!) 138/92    Weight from Last 3 Encounters:   18 313 lb (142 kg)   18 (!) 326 lb (147.9 kg)   18 (!) 336 lb (152.4 kg)              We Performed the Following     CBC with platelets     CK total     Comprehensive metabolic panel     Lactate Dehydrogenase     Lipase     TSH with free T4 reflex          Today's Medication Changes          These changes are accurate as of 18 11:00 AM.  If you have any questions, ask your nurse or doctor.               Start taking these medicines.        Dose/Directions    ondansetron 8 MG tablet   Commonly known as:  ZOFRAN   Used for:  Abdominal pain, generalized, Nausea and vomiting, intractability of vomiting not specified, unspecified vomiting type   Started by:  Attila Rodriguez MD        Dose:  8 mg   Take 1 tablet (8 mg) by mouth every 8 hours as needed for nausea   Quantity:  20 tablet   Refills:  1            Where to get your medicines      These medications were sent to [id number] - [place] - [address] [facility]  [address] [address]     Phone:  [phone]     " ondansetron 8 MG tablet                Primary Care Provider Office Phone # Fax #    Jameson Mathews [phone]       [address]  [place] [address]        Equal Access to Services     [facility] BUSTAMANTE : Hadii anabel ku hadanabelo Soomaali, waaxda luqadaha, qaybta kaalmada adeegyada, waxmary ann spearsn cailin room laElijahdesmond ibarra. So [facility] [phone].    ATENCIÓN: Si habla español, tiene a hernandez disposición servicios gratuitos de asistencia lingüística. Ll[facility] [phone].    We comply with applicable federal civil rights laws and [place] laws. We do not discriminate on the basis of race, color, national origin, age, disability, sex, sexual orientation, or gender identity.            Thank you!     Thank you for choosing [facility] [place]  for your care. Our goal is always to provide you with excellent care. Hearing back from our patients is one way we can continue to improve our services. Please take a few minutes to complete the written survey that you may receive in the mail after your visit with us. Thank you!             Your Updated Medication List - Protect others around you: Learn how to safely use, store and throw away your medicines at www.disposemymeds.org.          This list is accurate as of 5/9/18 11:00 AM.  Always use your most recent med list.                   Brand Name Dispense Instructions for use Diagnosis    cyclobenzaprine 10 MG tablet    FLEXERIL    60 tablet    Take 1 tablet (10 mg) by mouth 2 times daily as needed for muscle spasms    Back muscle spasm       HYDROcodone-acetaminophen 5-325 MG per tablet    NORCO    20 tablet    Take 1 tablet by mouth every 6 hours as needed for pain maximum 4 tablet(s) per day    Back muscle spasm       ondansetron 8 MG tablet    ZOFRAN    20 tablet    Take 1 tablet (8 mg) by mouth every 8 hours as needed for nausea    Abdominal pain, generalized, Nausea and vomiting, intractability of vomiting not specified, unspecified vomiting  type       priLOSEC 20 MG CR capsule   Generic drug:  omeprazole     30    1 TAB PO QD  otc        venlafaxine 75 MG tablet    EFFEXOR    180 tablet    Take 1 tablet (75 mg) by mouth 2 times daily (with meals)    Social phobia

## 2018-05-09 NOTE — LETTER
May 11, 2018      Kev Gilbert  [address] [place] [address]        Dear ,    We are writing to inform you of your test results.    You maybe able to check the lab results via [employer], it showed your lab results including complete blood cell counts/electrolyte balance and glucose/liver and kidney function/screening test for acute colitis(CK and LDH)/pancreatic function were all normal.  Your thyroid function is stable without clinical deterioration.   I will update the stool sample test and ultrasound sonogram results when available.    Resulted Orders   CBC with platelets   Result Value Ref Range    WBC 12.4 (H) 4.0 - 11.0 10e9/L    RBC Count 5.53 4.4 - 5.9 10e12/L    Hemoglobin 16.7 13.3 - 17.7 g/dL    Hematocrit 50.6 40.0 - 53.0 %    MCV 92 78 - 100 fl    MCH 30.2 26.5 - 33.0 pg    MCHC 33.0 31.5 - 36.5 g/dL    RDW 13.1 10.0 - 15.0 %    Platelet Count 221 150 - 450 10e9/L   Comprehensive metabolic panel   Result Value Ref Range    Sodium 140 133 - 144 mmol/L    Potassium 4.1 3.4 - 5.3 mmol/L    Chloride 107 94 - 109 mmol/L    Carbon Dioxide 26 20 - 32 mmol/L    Anion Gap 7 3 - 14 mmol/L    Glucose 96 70 - 99 mg/dL    Urea Nitrogen 11 7 - 30 mg/dL    Creatinine 0.93 0.66 - 1.25 mg/dL    GFR Estimate 88 >60 mL/min/1.7m2      Comment:      Non  GFR Calc    GFR Estimate If Black >90 >60 mL/min/1.7m2      Comment:       GFR Calc    Calcium 9.3 8.5 - 10.1 mg/dL    Bilirubin Total 0.4 0.2 - 1.3 mg/dL    Albumin 4.2 3.4 - 5.0 g/dL    Protein Total 7.7 6.8 - 8.8 g/dL    Alkaline Phosphatase 110 40 - 150 U/L    ALT 49 0 - 70 U/L    AST 20 0 - 45 U/L   TSH with free T4 reflex   Result Value Ref Range    TSH 4.15 (H) 0.40 - 4.00 mU/L   Lipase   Result Value Ref Range    Lipase 51 (L) 73 - 393 U/L   Lactate Dehydrogenase   Result Value Ref Range    Lactate Dehydrogenase 169 85 - 227 U/L   CK total   Result Value Ref Range    CK Total 71 30 - 300 U/L   T4 free   Result Value  Ref Range    T4 Free 0.99 0.76 - 1.46 ng/dL       If you have any questions or concerns, please call the clinic at the number listed above.       Sincerely,        Attila Rodriguez MD

## 2018-05-09 NOTE — PROGRESS NOTES
SUBJECTIVE:   Kev Gilbert is a 42 year old male who presents to clinic today for the following health issues:      Nausea      Duration: 2 weeks     Description (location/character/radiation): nausea    Intensity:  Mild to moderate    Accompanying signs and symptoms: fatigue, abdominal cramping     History (similar episodes/previous evaluation): None    Precipitating or alleviating factors: cold water helps     Therapies tried and outcome: Emetrol, changing diet        Problem list and histories reviewed & adjusted, as indicated.  Additional history: as documented    Patient Active Problem List   Diagnosis     Anxiety state     GERD (gastroesophageal reflux disease)     CARDIOVASCULAR SCREENING; LDL GOAL LESS THAN 160     Smoker     Obesity     Generalized anxiety disorder     Social phobia     Past Surgical History:   Procedure Laterality Date     SURGICAL HISTORY OF -   7/00    Upper GI       Social History   Substance Use Topics     Smoking status: Current Every Day Smoker     Packs/day: 1.00     Years: 20.00     Types: Cigarettes     Smokeless tobacco: Never Used     Alcohol use No     Family History   Problem Relation Age of Onset     Neurologic Disorder Paternal Grandfather          Current Outpatient Prescriptions   Medication Sig Dispense Refill     ondansetron (ZOFRAN) 8 MG tablet Take 1 tablet (8 mg) by mouth every 8 hours as needed for nausea 20 tablet 1     PRILOSEC 20 MG OR CPDR 1 TAB PO QD  otc 30 1     venlafaxine (EFFEXOR) 75 MG tablet Take 1 tablet (75 mg) by mouth 2 times daily (with meals) 180 tablet 1     cyclobenzaprine (FLEXERIL) 10 MG tablet Take 1 tablet (10 mg) by mouth 2 times daily as needed for muscle spasms (Patient not taking: Reported on 5/9/2018) 60 tablet 0     HYDROcodone-acetaminophen (NORCO) 5-325 MG per tablet Take 1 tablet by mouth every 6 hours as needed for pain maximum 4 tablet(s) per day (Patient not taking: Reported on 5/9/2018) 20 tablet 0     Allergies   Allergen  "Reactions     No Known Drug Allergies      Recent Labs   Lab Test  01/18/13   1026  01/11/13   1030  12/06/12   0947   LDL   --    --   115   ALT  68  121*   --    CR   --    --   1.02   GFRESTIMATED   --    --   81   GFRESTBLACK   --    --   >90   POTASSIUM   --    --   3.9      BP Readings from Last 3 Encounters:   05/09/18 119/85   02/27/18 118/80   02/06/18 (!) 138/92    Wt Readings from Last 3 Encounters:   05/09/18 313 lb (142 kg)   02/27/18 (!) 326 lb (147.9 kg)   02/06/18 (!) 336 lb (152.4 kg)              Reviewed and updated as needed this visit by clinical staff       Reviewed and updated as needed this visit by Provider         ROS:  Constitutional, HEENT, cardiovascular, pulmonary, gi and gu systems are negative, except as otherwise noted.    OBJECTIVE:     /85 (Cuff Size: Adult Large)  Pulse 100  Temp 98.4  F (36.9  C) (Tympanic)  Resp 18  Ht 6' 1\" (1.854 m)  Wt 313 lb (142 kg)  SpO2 99%  BMI 41.3 kg/m2  Body mass index is 41.3 kg/(m^2).  GENERAL: healthy, alert and no distress  EYES: Eyes grossly normal to inspection, PERRL and conjunctivae and sclerae normal  HENT: ear canals and TM's normal, nose and mouth without ulcers or lesions  NECK: no adenopathy, no asymmetry, masses, or scars and thyroid normal to palpation  RESP: lungs clear to auscultation - no rales, rhonchi or wheezes  CV: regular rate and rhythm, normal S1 S2, no S3 or S4, no murmur, click or rub, no peripheral edema and peripheral pulses strong  ABDOMEN: soft, nontender, no hepatosplenomegaly, no masses and bowel sounds normal  MS: no gross musculoskeletal defects noted, no edema  SKIN: no suspicious lesions or rashes  NEURO: Normal strength and tone, mentation intact and speech normal  BACK: no CVA tenderness, no paralumbar tenderness  PSYCH: mentation appears normal, affect normal/bright  LYMPH: no cervical, supraclavicular, axillary, or inguinal adenopathy        ASSESSMENT/PLAN:   Kev was seen today for " nausea.    Diagnoses and all orders for this visit:    Abdominal pain, generalized  -     CBC with platelets  -     Comprehensive metabolic panel  -     TSH with free T4 reflex  -     Lipase  -     Lactate Dehydrogenase  -     CK total  -     Enteric Bacteria and Virus Panel by OLAYINKA Stool; Future  -     ondansetron (ZOFRAN) 8 MG tablet; Take 1 tablet (8 mg) by mouth every 8 hours as needed for nausea  -     US Abdomen Complete; Future    Nausea and vomiting, intractability of vomiting not specified, unspecified vomiting type  -     ondansetron (ZOFRAN) 8 MG tablet; Take 1 tablet (8 mg) by mouth every 8 hours as needed for nausea  -     US Abdomen Complete; Future    Gastroesophageal reflux disease without esophagitis        Has been having issue for last 2 weeks, worsening and losing appetite, lost wt about 13 lbs,   Encouraged him to try PPI daily and TUMS tid  Will review the lab and US results, then update pt  Will consider EGD if indicated       Attila Rodriguez MD  [facility] [place]

## 2018-05-10 LAB
ALBUMIN SERPL-MCNC: 4.2 G/DL (ref 3.4–5)
ALP SERPL-CCNC: 110 U/L (ref 40–150)
ALT SERPL W P-5'-P-CCNC: 49 U/L (ref 0–70)
ANION GAP SERPL CALCULATED.3IONS-SCNC: 7 MMOL/L (ref 3–14)
AST SERPL W P-5'-P-CCNC: 20 U/L (ref 0–45)
BILIRUB SERPL-MCNC: 0.4 MG/DL (ref 0.2–1.3)
BUN SERPL-MCNC: 11 MG/DL (ref 7–30)
CALCIUM SERPL-MCNC: 9.3 MG/DL (ref 8.5–10.1)
CHLORIDE SERPL-SCNC: 107 MMOL/L (ref 94–109)
CK SERPL-CCNC: 71 U/L (ref 30–300)
CO2 SERPL-SCNC: 26 MMOL/L (ref 20–32)
CREAT SERPL-MCNC: 0.93 MG/DL (ref 0.66–1.25)
GFR SERPL CREATININE-BSD FRML MDRD: 88 ML/MIN/1.7M2
GLUCOSE SERPL-MCNC: 96 MG/DL (ref 70–99)
POTASSIUM SERPL-SCNC: 4.1 MMOL/L (ref 3.4–5.3)
PROT SERPL-MCNC: 7.7 G/DL (ref 6.8–8.8)
SODIUM SERPL-SCNC: 140 MMOL/L (ref 133–144)
T4 FREE SERPL-MCNC: 0.99 NG/DL (ref 0.76–1.46)
TSH SERPL DL<=0.005 MIU/L-ACNC: 4.15 MU/L (ref 0.4–4)

## 2018-05-10 PROCEDURE — 87506 IADNA-DNA/RNA PROBE TQ 6-11: CPT | Performed by: FAMILY MEDICINE

## 2018-05-10 ASSESSMENT — ANXIETY QUESTIONNAIRES: GAD7 TOTAL SCORE: 3

## 2018-05-10 ASSESSMENT — PATIENT HEALTH QUESTIONNAIRE - PHQ9: SUM OF ALL RESPONSES TO PHQ QUESTIONS 1-9: 6

## 2018-05-11 DIAGNOSIS — R10.84 ABDOMINAL PAIN, GENERALIZED: ICD-10-CM

## 2018-06-20 ENCOUNTER — OFFICE VISIT (OUTPATIENT)
Dept: FAMILY MEDICINE | Facility: CLINIC | Age: 43
End: 2018-06-20

## 2018-06-20 VITALS
BODY MASS INDEX: 41.56 KG/M2 | TEMPERATURE: 96.9 F | SYSTOLIC BLOOD PRESSURE: 122 MMHG | WEIGHT: 315 LBS | DIASTOLIC BLOOD PRESSURE: 80 MMHG | HEART RATE: 109 BPM

## 2018-06-20 DIAGNOSIS — S16.1XXA STRAIN OF NECK MUSCLE, INITIAL ENCOUNTER: Primary | ICD-10-CM

## 2018-06-20 DIAGNOSIS — E66.01 MORBID OBESITY (H): ICD-10-CM

## 2018-06-20 PROCEDURE — 96372 THER/PROPH/DIAG INJ SC/IM: CPT | Performed by: INTERNAL MEDICINE

## 2018-06-20 PROCEDURE — 99213 OFFICE O/P EST LOW 20 MIN: CPT | Mod: 25 | Performed by: INTERNAL MEDICINE

## 2018-06-20 RX ORDER — KETOROLAC TROMETHAMINE 30 MG/ML
30 INJECTION, SOLUTION INTRAMUSCULAR; INTRAVENOUS ONCE
Qty: 1 ML | Refills: 0 | OUTPATIENT
Start: 2018-06-20 | End: 2018-06-20

## 2018-06-20 RX ORDER — CYCLOBENZAPRINE HCL 10 MG
10 TABLET ORAL 3 TIMES DAILY PRN
Qty: 90 TABLET | Refills: 1 | Status: SHIPPED | OUTPATIENT
Start: 2018-06-20 | End: 2020-09-16

## 2018-06-20 NOTE — PATIENT INSTRUCTIONS
[facility]: [facility]  [phone]   [email or web address]

## 2018-06-20 NOTE — MR AVS SNAPSHOT
After Visit Summary   6/20/2018    Kev Gilbert    MRN: [id number]           Patient Information     Date Of Birth          1975        Visit Information        Provider Department      6/20/2018 1:20 PM Bell Dalton MD [facility]        Today's Diagnoses     Strain of neck muscle, initial encounter    -  1    Morbid obesity (H)          Care Instructions    [facility]: [facility] -  [facility]  [phone]   [email or web address]          Follow-ups after your visit        Follow-up notes from your care team     Return in about 1 week (around 6/27/2018) for if no better with Sports Medicine .      Who to contact     If you have questions or need follow up information about today's clinic visit or your schedule please contact [facility] directly at [phone].  Normal or non-critical lab and imaging results will be communicated to you by MyChart, letter or phone within 4 business days after the clinic has received the results. If you do not hear from us within 7 days, please contact the clinic through [employer] or phone. If you have a critical or abnormal lab result, we will notify you by phone as soon as possible.  Submit refill requests through [employer] or call your pharmacy and they will forward the refill request to us. Please allow 3 business days for your refill to be completed.          Additional Information About Your Visit        Care EveryWhere ID     This is your Care EveryWhere ID. This could be used by other organizations to access your [facility] medical records  [id number]        Your Vitals Were     Pulse Temperature BMI (Body Mass Index)             109 96.9  F (36.1  C) (Tympanic) 41.56 kg/m2          Blood Pressure from Last 3 Encounters:   06/20/18 122/80   05/09/18 119/85   02/27/18 118/80    Weight from Last 3  Encounters:   06/20/18 315 lb (142.9 kg)   05/09/18 313 lb (142 kg)   02/27/18 (!) 326 lb (147.9 kg)              Today, you had the following     No orders found for display         Today's Medication Changes          These changes are accurate as of 6/20/18  1:57 PM.  If you have any questions, ask your nurse or doctor.               These medicines have changed or have updated prescriptions.        Dose/Directions    * cyclobenzaprine 10 MG tablet   Commonly known as:  FLEXERIL   This may have changed:  Another medication with the same name was added. Make sure you understand how and when to take each.   Used for:  Back muscle spasm   Changed by:  Bell Dalton MD        Dose:  10 mg   Take 1 tablet (10 mg) by mouth 2 times daily as needed for muscle spasms   Quantity:  60 tablet   Refills:  0       * cyclobenzaprine 10 MG tablet   Commonly known as:  FLEXERIL   This may have changed:  You were already taking a medication with the same name, and this prescription was added. Make sure you understand how and when to take each.   Used for:  Strain of neck muscle, initial encounter   Changed by:  Bell Dalton MD        Dose:  10 mg   Take 1 tablet (10 mg) by mouth 3 times daily as needed for muscle spasms   Quantity:  90 tablet   Refills:  1       * Notice:  This list has 2 medication(s) that are the same as other medications prescribed for you. Read the directions carefully, and ask your doctor or other care provider to review them with you.         Where to get your medicines      These medications were sent to [id number] [address]  [address] [facility]  [address] [address]     Phone:  [phone]     cyclobenzaprine 10 MG tablet                Primary Care Provider Office Phone # Fax #    Attila Rodriguez [phone]       [address] [place] [address]        Equal Access to Services     [facility] ROBBY AH:  Hadii aad ku hadanabelo Soomaali, waaxda luqadaha, qaybta kaalmada kiki, josee terellmonet hatchmaddison stacey. So [facility] [phone].    ATENCIÓN: Si reed walker, tiene a hernandez disposición servicios gratuitos de asistencia lingüística. Llame al [phone].    We comply with applicable federal civil rights laws and [place] laws. We do not discriminate on the basis of race, color, national origin, age, disability, sex, sexual orientation, or gender identity.            Thank you!     Thank you for choosing [facility]  for your care. Our goal is always to provide you with excellent care. Hearing back from our patients is one way we can continue to improve our services. Please take a few minutes to complete the written survey that you may receive in the mail after your visit with us. Thank you!             Your Updated Medication List - Protect others around you: Learn how to safely use, store and throw away your medicines at www.disposemymeds.org.          This list is accurate as of 6/20/18  1:57 PM.  Always use your most recent med list.                   Brand Name Dispense Instructions for use Diagnosis    * cyclobenzaprine 10 MG tablet    FLEXERIL    60 tablet    Take 1 tablet (10 mg) by mouth 2 times daily as needed for muscle spasms    Back muscle spasm       * cyclobenzaprine 10 MG tablet    FLEXERIL    90 tablet    Take 1 tablet (10 mg) by mouth 3 times daily as needed for muscle spasms    Strain of neck muscle, initial encounter       HYDROcodone-acetaminophen 5-325 MG per tablet    NORCO    20 tablet    Take 1 tablet by mouth every 6 hours as needed for pain maximum 4 tablet(s) per day    Back muscle spasm       ondansetron 8 MG tablet    ZOFRAN    20 tablet    Take 1 tablet (8 mg) by mouth every 8 hours as needed for nausea    Abdominal pain, generalized, Nausea and vomiting, intractability of vomiting not specified, unspecified vomiting type       priLOSEC 20 MG CR capsule    Generic drug:  omeprazole     30    1 TAB PO QD  otc        venlafaxine 75 MG tablet    EFFEXOR    180 tablet    Take 1 tablet (75 mg) by mouth 2 times daily (with meals)    Social phobia       * Notice:  This list has 2 medication(s) that are the same as other medications prescribed for you. Read the directions carefully, and ask your doctor or other care provider to review them with you.

## 2018-06-20 NOTE — PROGRESS NOTES
SUBJECTIVE:   Kev Gilbert is a 42 year old male who presents to clinic today for the following health issues:    Kev is here with bad right-sided neck and shoulder pain.  It started after he took a nap last week and slept on it funny.  Pain seems to start in the shoulder and shoot up the neck and down to the elbow.  The pain is quite severe. He is not getting any sleep. Has tried Tylenol, ibuprofen, ice pack, heating pad, massage, even some leftover hydrocodone pills he had from a back pain flare a few months ago, but nothing seems to be helping.    He works in a [employer] store, has been difficult to lift anything.           Reviewed and updated as needed this visit by clinical staff  Tobacco  Allergies  Meds       Reviewed and updated as needed this visit by Provider         ROS:  Msk, neuro reviewed,  otherwise negative unless noted above.       OBJECTIVE:     /80 (BP Location: Right arm, Patient Position: Chair, Cuff Size: Adult Large)  Pulse 109  Temp 96.9  F (36.1  C) (Tympanic)  Wt 315 lb (142.9 kg)  BMI 41.56 kg/m2  Body mass index is 41.56 kg/(m^2).    Gen: pleasant, obese gentleman, diaphoretic and uncomfortable appearing   MSK: no point cervical spine, shoulder muscle or bony tenderness.  Full AROM at the right shoulder.    Neuro: full strength in UE's b/l         ASSESSMENT/PLAN:         1. Strain of neck muscle, initial encounter  It is a little unclear to me why he is in so much pain from a seemingly minimal injury.  Continue heating pad, massage, etc.  Given dose of 30mg toradol in the office today.  Add flexeril.  Consider imaging if pain does not improve.   - cyclobenzaprine (FLEXERIL) 10 MG tablet; Take 1 tablet (10 mg) by mouth 3 times daily as needed for muscle spasms  Dispense: 90 tablet; Refill: 1    2. Morbid obesity (H)        F/U as needed for persistent or worsening symptoms.       Bell Dalton MD  [facility] [place]

## 2018-07-13 ENCOUNTER — OFFICE VISIT (OUTPATIENT)
Dept: FAMILY MEDICINE | Facility: CLINIC | Age: 43
End: 2018-07-13
Payer: COMMERCIAL

## 2018-07-13 VITALS
HEART RATE: 103 BPM | TEMPERATURE: 97.4 F | HEIGHT: 73 IN | DIASTOLIC BLOOD PRESSURE: 74 MMHG | SYSTOLIC BLOOD PRESSURE: 105 MMHG | WEIGHT: 305 LBS | BODY MASS INDEX: 40.42 KG/M2 | OXYGEN SATURATION: 99 % | RESPIRATION RATE: 14 BRPM

## 2018-07-13 DIAGNOSIS — R10.84 ABDOMINAL PAIN, GENERALIZED: ICD-10-CM

## 2018-07-13 DIAGNOSIS — R11.2 NAUSEA AND VOMITING, INTRACTABILITY OF VOMITING NOT SPECIFIED, UNSPECIFIED VOMITING TYPE: ICD-10-CM

## 2018-07-13 DIAGNOSIS — R10.84 ABDOMINAL PAIN, GENERALIZED: Primary | ICD-10-CM

## 2018-07-13 PROCEDURE — 99213 OFFICE O/P EST LOW 20 MIN: CPT | Performed by: FAMILY MEDICINE

## 2018-07-13 RX ORDER — PANTOPRAZOLE SODIUM 40 MG/1
TABLET, DELAYED RELEASE ORAL
Qty: 90 TABLET | Refills: 0 | Status: SHIPPED | OUTPATIENT
Start: 2018-07-13 | End: 2018-12-10

## 2018-07-13 RX ORDER — PANTOPRAZOLE SODIUM 40 MG/1
40 TABLET, DELAYED RELEASE ORAL DAILY
Qty: 30 TABLET | Refills: 1 | Status: SHIPPED | OUTPATIENT
Start: 2018-07-13 | End: 2018-07-13

## 2018-07-13 NOTE — PROGRESS NOTES
SUBJECTIVE:   Kev Gilbert is a 42 year old male who presents to clinic today for the following health issues:    Pt refused to fill out PHQ-9 form. Brittny Regan CMA      Nausea       Duration: 4 days     Description (location/character/radiation): nausea, vomiting     Intensity:  Mild     Accompanying signs and symptoms: fatigue     History (similar episodes/previous evaluation): pt states he had this several times this year     Precipitating or alleviating factors: changed diet to low carb diet recently    Therapies tried and outcome: Ondansetron      Problem list and histories reviewed & adjusted, as indicated.  Additional history: as documented    Patient Active Problem List   Diagnosis     Anxiety state     GERD (gastroesophageal reflux disease)     CARDIOVASCULAR SCREENING; LDL GOAL LESS THAN 160     Smoker     Obesity     Generalized anxiety disorder     Social phobia     Morbid obesity (H)     Past Surgical History:   Procedure Laterality Date     SURGICAL HISTORY OF -   7/00    Upper GI       Social History   Substance Use Topics     Smoking status: Current Every Day Smoker     Packs/day: 1.00     Years: 20.00     Types: Cigarettes     Smokeless tobacco: Never Used     Alcohol use No     Family History   Problem Relation Age of Onset     Neurologic Disorder Paternal Grandfather          Current Outpatient Prescriptions   Medication Sig Dispense Refill     ondansetron (ZOFRAN) 8 MG tablet Take 1 tablet (8 mg) by mouth every 8 hours as needed for nausea 20 tablet 1     pantoprazole (PROTONIX) 40 MG EC tablet Take 1 tablet (40 mg) by mouth daily Take 30-60 minutes before a meal. 30 tablet 1     PRILOSEC 20 MG OR CPDR 1 TAB PO QD  otc 30 1     venlafaxine (EFFEXOR) 75 MG tablet Take 1 tablet (75 mg) by mouth 2 times daily (with meals) 180 tablet 1     cyclobenzaprine (FLEXERIL) 10 MG tablet Take 1 tablet (10 mg) by mouth 3 times daily as needed for muscle spasms (Patient not taking: Reported on 7/13/2018) 90  "tablet 1     HYDROcodone-acetaminophen (NORCO) 5-325 MG per tablet Take 1 tablet by mouth every 6 hours as needed for pain maximum 4 tablet(s) per day (Patient not taking: Reported on 5/9/2018) 20 tablet 0     Allergies   Allergen Reactions     No Known Drug Allergies      Recent Labs   Lab Test  05/09/18   1101  01/18/13   1026  01/11/13   1030  12/06/12   0947   LDL   --    --    --   115   ALT  49  68  121*   --    CR  0.93   --    --   1.02   GFRESTIMATED  88   --    --   81   GFRESTBLACK  >90   --    --   >90   POTASSIUM  4.1   --    --   3.9   TSH  4.15*   --    --    --       BP Readings from Last 3 Encounters:   07/13/18 105/74   06/20/18 122/80   05/09/18 119/85    Wt Readings from Last 3 Encounters:   07/13/18 305 lb (138.3 kg)   06/20/18 315 lb (142.9 kg)   05/09/18 313 lb (142 kg)                    Reviewed and updated as needed this visit by clinical staff       Reviewed and updated as needed this visit by Provider         ROS:  Constitutional, HEENT, cardiovascular, pulmonary, gi and gu systems are negative, except as otherwise noted.    OBJECTIVE:     /74 (Cuff Size: Adult Large)  Pulse 103  Temp 97.4  F (36.3  C) (Tympanic)  Resp 14  Ht 6' 1\" (1.854 m)  Wt 305 lb (138.3 kg)  SpO2 99%  BMI 40.24 kg/m2  Body mass index is 40.24 kg/(m^2).  GENERAL: healthy, alert and no distress  NECK: no adenopathy, no asymmetry, masses, or scars and thyroid normal to palpation  RESP: lungs clear to auscultation - no rales, rhonchi or wheezes  CV: regular rate and rhythm, normal S1 S2, no S3 or S4, no murmur, click or rub, no peripheral edema and peripheral pulses strong  ABDOMEN: soft, nontender, no hepatosplenomegaly, no masses and bowel sounds normal  MS: no gross musculoskeletal defects noted, no edema        ASSESSMENT/PLAN:   Kev was seen today for nausea.    Diagnoses and all orders for this visit:    Abdominal pain, generalized  -     GASTROENTEROLOGY ADULT REF PROCEDURE ONLY Patrick Pena " [phone]  -     US Abdomen Complete; Future  -     pantoprazole (PROTONIX) 40 MG EC tablet; Take 1 tablet (40 mg) by mouth daily Take 30-60 minutes before a meal.    Nausea and vomiting, intractability of vomiting not specified, unspecified vomiting type  -     GASTROENTEROLOGY ADULT REF PROCEDURE ONLY Patrick Pena [phone]  -     US Abdomen Complete; Future      Sx has been lingering with abd pain, hasn't check on US  Will reorder him to do abd US and also will have him to check EGD  Will switch omeprazole to pantoprazole      Attila Rodriguez MD  [facility] [place]

## 2018-07-13 NOTE — MR AVS SNAPSHOT
After Visit Summary   7/13/2018    Kev Gilbert    MRN: [id number]           Patient Information     Date Of Birth          1975        Visit Information        Provider Department      7/13/2018 2:00 PM Attila Rodriguez MD [facility]        Today's Diagnoses     Abdominal pain, generalized    -  1    Nausea and vomiting, intractability of vomiting not specified, unspecified vomiting type           Follow-ups after your visit        Additional Services     GASTROENTEROLOGY ADULT REF PROCEDURE ONLY Patrcik Pena [phone]       Last Lab Result: Creatinine (mg/dL)       Date                     Value                 05/09/2018               0.93             ----------  Body mass index is 40.24 kg/(m^2).     Needed:  No  Language:  English    Patient will be contacted to schedule procedure.     Please be aware that coverage of these services is subject to the terms and limitations of your health insurance plan.  Call member services at your health plan with any benefit or coverage questions.  Any procedures must be performed at a [facility] facility OR coordinated by your clinic's referral office.    Please bring the following with you to your appointment:    (1) Any X-Rays, CTs or MRIs which have been performed.  Contact the facility where they were done to arrange for  prior to your scheduled appointment.    (2) List of current medications   (3) This referral request   (4) Any documents/labs given to you for this referral                  Follow-up notes from your care team     Return in about 3 months (around 10/13/2018) for Routine Visit.      Future tests that were ordered for you today     Open Future Orders        Priority Expected Expires Ordered    US Abdomen Complete Routine  7/13/2019 7/13/2018            Who to contact     If you have questions or need follow up information about today's clinic visit or your schedule please contact [place]  [facility] directly at [phone].  Normal or non-critical lab and imaging results will be communicated to you by MyChart, letter or phone within 4 business days after the clinic has received the results. If you do not hear from us within 7 days, please contact the clinic through MyChart or phone. If you have a critical or abnormal lab result, we will notify you by phone as soon as possible.  Submit refill requests through [facility] or call your pharmacy and they will forward the refill request to us. Please allow 3 business days for your refill to be completed.          Additional Information About Your Visit        Care EveryWhere ID     This is your Care EveryWhere ID. This could be used by other organizations to access your [place] medical records  [id number]        Your Vitals Were     Pulse Temperature Respirations Height Pulse Oximetry BMI (Body Mass Index)    103 97.4  F (36.3  C) (Tympanic) 14 6' 1\" (1.854 m) 99% 40.24 kg/m2       Blood Pressure from Last 3 Encounters:   07/13/18 105/74   06/20/18 122/80   05/09/18 119/85    Weight from Last 3 Encounters:   07/13/18 305 lb (138.3 kg)   06/20/18 315 lb (142.9 kg)   05/09/18 313 lb (142 kg)              We Performed the Following     GASTROENTEROLOGY ADULT REF PROCEDURE ONLY Patrick Pena [phone]          Today's Medication Changes          These changes are accurate as of 7/13/18  2:29 PM.  If you have any questions, ask your nurse or doctor.               Start taking these medicines.        Dose/Directions    pantoprazole 40 MG EC tablet   Commonly known as:  PROTONIX   Used for:  Abdominal pain, generalized   Started by:  Attila Rodriguez MD        Dose:  40 mg   Take 1 tablet (40 mg) by mouth daily Take 30-60 minutes before a meal.   Quantity:  30 tablet   Refills:  1            Where to get your medicines      These medications were sent to [facility] [address] [facility] [place] [place] - [address] AT [facility]  " [address] [place] [address]     Phone:  [phone]     pantoprazole 40 MG EC tablet                Primary Care Provider Office Phone # Fax #    Attila Rodriguez [phone]       [address] [facility] [address]        Equal Access to Services     [facility] Hospital[facility] : Hadii aad ku hadasho Soomaali, waaxda luqadaha, qaybta kaalmada adeegyada, waxay terellin hayaan adekobi diazleonashley young . So [facility] [phone].    ATENCIÓN: Si habla español, tiene a hernandez disposición servicios gratuitos de asistencia lingüística. [facility] [phone].    We comply with applicable federal civil rights laws and [place] laws. We do not discriminate on the basis of race, color, national origin, age, disability, sex, sexual orientation, or gender identity.            Thank you!     Thank you for choosing [facility]  for your care. Our goal is always to provide you with excellent care. Hearing back from our patients is one way we can continue to improve our services. Please take a few minutes to complete the written survey that you may receive in the mail after your visit with us. Thank you!             Your Updated Medication List - Protect others around you: Learn how to safely use, store and throw away your medicines at www.disposemymeds.org.          This list is accurate as of 7/13/18  2:29 PM.  Always use your most recent med list.                   Brand Name Dispense Instructions for use Diagnosis    cyclobenzaprine 10 MG tablet    FLEXERIL    90 tablet    Take 1 tablet (10 mg) by mouth 3 times daily as needed for muscle spasms    Strain of neck muscle, initial encounter       HYDROcodone-acetaminophen 5-325 MG per tablet    NORCO    20 tablet    Take 1 tablet by mouth every 6 hours as needed for pain maximum 4 tablet(s) per day    Back muscle spasm       ondansetron 8 MG tablet    ZOFRAN    20 tablet    Take 1 tablet (8 mg) by mouth every 8 hours as needed for nausea    Abdominal  pain, generalized, Nausea and vomiting, intractability of vomiting not specified, unspecified vomiting type       pantoprazole 40 MG EC tablet    PROTONIX    30 tablet    Take 1 tablet (40 mg) by mouth daily Take 30-60 minutes before a meal.    Abdominal pain, generalized       priLOSEC 20 MG CR capsule   Generic drug:  omeprazole     30    1 TAB PO QD  otc        venlafaxine 75 MG tablet    EFFEXOR    180 tablet    Take 1 tablet (75 mg) by mouth 2 times daily (with meals)    Social phobia

## 2018-07-13 NOTE — TELEPHONE ENCOUNTER
"Last Written Prescription Date:  7/13/2018  Last Fill Quantity: 30,  # refills: 1   Last office visit: 7/13/2018 with prescribing provider:  Dr. Rodriguez   Future Office Visit:        Requested Prescriptions   Pending Prescriptions Disp Refills     pantoprazole (PROTONIX) 40 MG EC tablet [Pharmacy Med Name: PANTOPRAZOLE 40MG TABLETS] 90 tablet 1     Sig: TAKE 1 TABLET(40 MG) BY MOUTH DAILY 30 TO 60 MINUTES BEFORE A MEAL    PPI Protocol Passed    7/13/2018  2:51 PM       Passed - Not on Clopidogrel (unless Pantoprazole ordered)       Passed - No diagnosis of osteoporosis on record       Passed - Recent (12 mo) or future (30 days) visit within the authorizing provider's specialty    Patient had office visit in the last 12 months or has a visit in the next 30 days with authorizing provider or within the authorizing provider's specialty.  See \"Patient Info\" tab in inbasket, or \"Choose Columns\" in Meds & Orders section of the refill encounter.           Passed - Patient is age 18 or older            Prescription approved per [facility] Refill Protocol.  #90 only.    Ava Hayward, KEVIN, RN, PHN    "

## 2018-07-18 ENCOUNTER — TELEPHONE (OUTPATIENT)
Dept: FAMILY MEDICINE | Facility: CLINIC | Age: 43
End: 2018-07-18

## 2018-07-18 ENCOUNTER — TRANSFERRED RECORDS (OUTPATIENT)
Dept: HEALTH INFORMATION MANAGEMENT | Facility: CLINIC | Age: 43
End: 2018-07-18

## 2018-07-19 NOTE — TELEPHONE ENCOUNTER
Has fatty liver and small non-obstructive gall stone. Otherwise, it is stable finding.   Will review the EGD finding when available and update pt  Please let him know    thx

## 2018-07-19 NOTE — TELEPHONE ENCOUNTER
Unable to leave message, voicemail box is not set up.  Viri Washington RN  [employer] [facility]

## 2018-07-19 NOTE — TELEPHONE ENCOUNTER
Kev returned nurse call, but RN's are unavailable. Please call pt back.    Adelita Wyatt  Patient Representative

## 2018-07-19 NOTE — TELEPHONE ENCOUNTER
Patient was notified with information noted by provider and agreed with plan.  Educated patient on fatty liver and to increase exercise, fruits and vegetables, eliminate fried, fatty, greasy foods, alcohol and smoking if he is smoking.    Viri Washington RN  [employer] [facility]

## 2018-07-23 ENCOUNTER — TELEPHONE (OUTPATIENT)
Dept: FAMILY MEDICINE | Facility: CLINIC | Age: 43
End: 2018-07-23

## 2018-12-10 DIAGNOSIS — R10.84 ABDOMINAL PAIN, GENERALIZED: ICD-10-CM

## 2018-12-10 NOTE — TELEPHONE ENCOUNTER
Reason for Call:  Other prescription    Detailed comments: Pt called this morning and would like a refill on his protonix. Pt's insurance only allows him to get a 30 day supply which is why he is out already. Please refill this and give pt a call if there are any questions. Thank you.    Phone Number Patient can be reached at: Home number on file [phone] (home)    Best Time:     Can we leave a detailed message on this number? YES    Call taken on 12/10/2018 at 8:50 AM by Sharmila Crow

## 2018-12-10 NOTE — TELEPHONE ENCOUNTER
"Last Written Prescription Date:  7/13/2018  Last Fill Quantity: 90,  # refills: 0   Last office visit: 7/13/2018 with prescribing provider:     Future Office Visit:    Requested Prescriptions   Pending Prescriptions Disp Refills     pantoprazole (PROTONIX) 40 MG EC tablet 90 tablet 0    PPI Protocol Passed - 12/10/2018  8:53 AM       Passed - Not on Clopidogrel (unless Pantoprazole ordered)       Passed - No diagnosis of osteoporosis on record       Passed - Recent (12 mo) or future (30 days) visit within the authorizing provider's specialty    Patient had office visit in the last 12 months or has a visit in the next 30 days with authorizing provider or within the authorizing provider's specialty.  See \"Patient Info\" tab in inbasket, or \"Choose Columns\" in Meds & Orders section of the refill encounter.             Passed - Patient is age 18 or older          "

## 2018-12-11 RX ORDER — PANTOPRAZOLE SODIUM 40 MG/1
TABLET, DELAYED RELEASE ORAL
Qty: 90 TABLET | Refills: 1 | Status: SHIPPED | OUTPATIENT
Start: 2018-12-11 | End: 2019-06-05

## 2019-02-05 DIAGNOSIS — F40.10 SOCIAL PHOBIA: ICD-10-CM

## 2019-02-05 RX ORDER — VENLAFAXINE 75 MG/1
75 TABLET ORAL 2 TIMES DAILY WITH MEALS
Qty: 180 TABLET | Refills: 1 | Status: SHIPPED | OUTPATIENT
Start: 2019-02-05 | End: 2019-06-05

## 2019-02-05 NOTE — TELEPHONE ENCOUNTER
"venlafaxine (EFFEXOR) 75 MG tablet      Last Written Prescription Date:  2/6/18  Last Fill Quantity: 180,  # refills: 1   Last office visit: 7/13/2018 with prescribing provider:  Marianne Rodriguez   Future Office Visit:      Requested Prescriptions   Pending Prescriptions Disp Refills     venlafaxine (EFFEXOR) 75 MG tablet 180 tablet 1     Sig: Take 1 tablet (75 mg) by mouth 2 times daily (with meals)    Serotonin-Norepinephrine Reuptake Inhibitors  Passed - 2/5/2019  3:17 PM       Passed - Blood pressure under 140/90 in past 12 months    BP Readings from Last 3 Encounters:   07/13/18 105/74   06/20/18 122/80   05/09/18 119/85                Passed - Recent (12 mo) or future (30 days) visit within the authorizing provider's specialty    Patient had office visit in the last 12 months or has a visit in the next 30 days with authorizing provider or within the authorizing provider's specialty.  See \"Patient Info\" tab in inbasket, or \"Choose Columns\" in Meds & Orders section of the refill encounter.             Passed - Medication is active on med list       Passed - Patient is age 18 or older       Passed - Normal serum creatinine on file in past 12 months    Recent Labs   Lab Test 05/09/18  1101   CR 0.93             Routing refill request to provider for review/approval because:  Patient needs to be seen because:  Per note on 7/13/18 Patient was to routing around 10/13/18 for a routine visit.     Katie BROWNN, RN   [facility]             "

## 2019-02-05 NOTE — TELEPHONE ENCOUNTER
Reason for Call:   medication refill:    Do you use a [facility]?  Name of the pharmacy and phone number for the current request:     [facility] - [place],  [phone]    Name of the medication requested:     venlafaxine (EFFEXOR) 75 MG tablet          Other request:  N/A  Can we leave a detailed message on this number? Yes    Phone number patient can be reached at: Cell number on file:    Telephone Information:   Mobile [phone]       Best Time: any    Call taken on 2/5/2019 at 3:13 PM by Cristin Cheung

## 2019-06-04 DIAGNOSIS — F40.10 SOCIAL PHOBIA: ICD-10-CM

## 2019-06-04 DIAGNOSIS — R10.84 ABDOMINAL PAIN, GENERALIZED: ICD-10-CM

## 2019-06-04 NOTE — TELEPHONE ENCOUNTER
Reason for Call:  Medication or medication refill    Do you use a [facility]?  Name of the pharmacy and phone number for the current request:      Name of the medication requested: venlafaxine (EFFEXOR) 75 MG tablet & pantoprazole (PROTONIX) 40 MG EC tablet    Other request: NO    Can we leave a detailed message on this number? YES    Phone number patient can be reached at: Cell number on file:    Telephone Information:   Mobile [phone]       Best Time: ANYTIME    Call taken on 6/4/2019 at 3:28 PM by Avani Pope

## 2019-06-05 RX ORDER — VENLAFAXINE 75 MG/1
75 TABLET ORAL 2 TIMES DAILY WITH MEALS
Qty: 180 TABLET | Refills: 0 | Status: SHIPPED | OUTPATIENT
Start: 2019-06-05 | End: 2020-09-16

## 2019-06-05 RX ORDER — PANTOPRAZOLE SODIUM 40 MG/1
TABLET, DELAYED RELEASE ORAL
Qty: 90 TABLET | Refills: 0 | Status: SHIPPED | OUTPATIENT
Start: 2019-06-05 | End: 2019-08-05

## 2019-06-05 NOTE — TELEPHONE ENCOUNTER
"Requested Prescriptions   Pending Prescriptions Disp Refills     pantoprazole (PROTONIX) 40 MG EC tablet 90 tablet 1     Sig: TAKE 1 TABLET(40 MG) BY MOUTH DAILY 30 TO 60 MINUTES BEFORE A MEAL       PPI Protocol Passed - 6/4/2019  3:30 PM        Passed - Not on Clopidogrel (unless Pantoprazole ordered)        Passed - No diagnosis of osteoporosis on record        Passed - Recent (12 mo) or future (30 days) visit within the authorizing provider's specialty     Patient had office visit in the last 12 months or has a visit in the next 30 days with authorizing provider or within the authorizing provider's specialty.  See \"Patient Info\" tab in inbasket, or \"Choose Columns\" in Meds & Orders section of the refill encounter.              Passed - Medication is active on med list        Passed - Patient is age 18 or older        venlafaxine (EFFEXOR) 75 MG tablet 180 tablet 1     Sig: Take 1 tablet (75 mg) by mouth 2 times daily (with meals)       Serotonin-Norepinephrine Reuptake Inhibitors  Failed - 6/4/2019  3:30 PM        Failed - Normal serum creatinine on file in past 12 months     Recent Labs   Lab Test 05/09/18  1101   CR 0.93             Passed - Blood pressure under 140/90 in past 12 months     BP Readings from Last 3 Encounters:   07/13/18 105/74   06/20/18 122/80   05/09/18 119/85                 Passed - Recent (12 mo) or future (30 days) visit within the authorizing provider's specialty     Patient had office visit in the last 12 months or has a visit in the next 30 days with authorizing provider or within the authorizing provider's specialty.  See \"Patient Info\" tab in inbasket, or \"Choose Columns\" in Meds & Orders section of the refill encounter.              Passed - Medication is active on med list        Passed - Patient is age 18 or older        Pantoprazole  Last Written Prescription Date:  12/11/2018  Last Fill Quantity: 90,  # refills: 1   Last office visit: 7/13/2018 with prescribing provider:  " 7/13/2018   Future Office Visit:    Effexor  Last Written Prescription Date:  2/5/2019  Last Fill Quantity: 180,  # refills: 1   Last office visit: 7/13/2018 with prescribing provider:  7/13/2018   Future Office Visit:    PHQ-9 SCORE 9/1/2015 2/24/2017 5/9/2018   PHQ-9 Total Score - - -   PHQ-9 Total Score 4 2 6     MATTY-7 SCORE 9/1/2015 2/24/2017 5/9/2018   Total Score - - -   Total Score 5 0 3

## 2019-06-05 NOTE — TELEPHONE ENCOUNTER
Routing refill request to provider for review/approval because:  Labs not current:  Cr    Filled per [facility] protocol-pantoprazole    KEVIN ChinN, RN  [employer] [facility]

## 2019-08-05 DIAGNOSIS — R10.84 ABDOMINAL PAIN, GENERALIZED: ICD-10-CM

## 2019-08-05 RX ORDER — PANTOPRAZOLE SODIUM 40 MG/1
TABLET, DELAYED RELEASE ORAL
Qty: 30 TABLET | Refills: 0 | Status: SHIPPED | OUTPATIENT
Start: 2019-08-05 | End: 2019-09-06

## 2019-08-05 NOTE — TELEPHONE ENCOUNTER
Patient due for fasting office visit- 30 days supply given.  Routing to team to schedule appointment     Jazzy Weinstein RN  [facility] [place]  [phone]

## 2019-08-05 NOTE — TELEPHONE ENCOUNTER
Reason for Call:  Other prescription    Detailed comments: Pt called this morning and would like a refill on his prontonix. Please refill this and give pt a call once it is ready for . Thank you.    Phone Number Patient can be reached at: Home number on file [phone] (home)    Best Time:     Can we leave a detailed message on this number? YES    Call taken on 8/5/2019 at 8:50 AM by Sharmila Crow

## 2019-08-05 NOTE — TELEPHONE ENCOUNTER
"Requested Prescriptions   Pending Prescriptions Disp Refills     pantoprazole (PROTONIX) 40 MG EC tablet 90 tablet 0     Sig: TAKE 1 TABLET(40 MG) BY MOUTH DAILY 30 TO 60 MINUTES BEFORE A MEAL  Last Written Prescription Date:  6/5/19  Last Fill Quantity: 90,  # refills: 0   Last office visit: 7/13/2018 with prescribing provider:  Michael   Future Office Visit:           PPI Protocol Failed - 8/5/2019  8:51 AM        Failed - Recent (12 mo) or future (30 days) visit within the authorizing provider's specialty     Patient had office visit in the last 12 months or has a visit in the next 30 days with authorizing provider or within the authorizing provider's specialty.  See \"Patient Info\" tab in inbasket, or \"Choose Columns\" in Meds & Orders section of the refill encounter.              Passed - Not on Clopidogrel (unless Pantoprazole ordered)        Passed - No diagnosis of osteoporosis on record        Passed - Medication is active on med list        Passed - Patient is age 18 or older          "

## 2019-08-05 NOTE — TELEPHONE ENCOUNTER
Spoke with patient and gave him information.  I offered to schedule him appointment but he was at work and wanted to call back.  Joya Trinidad MA

## 2019-09-05 NOTE — PROGRESS NOTES
Subjective     Kev Gilbert is a 43 year old male who presents to clinic today for the following health issues:    HPI   Medication Followup of Protonix     Taking Medication as prescribed: yes    Side Effects:  None    Medication Helping Symptoms:  yes       Patient Active Problem List   Diagnosis     Anxiety state     GERD (gastroesophageal reflux disease)     CARDIOVASCULAR SCREENING; LDL GOAL LESS THAN 160     Smoker     Obesity     Generalized anxiety disorder     Social phobia     Morbid obesity (H)     Past Surgical History:   Procedure Laterality Date     SURGICAL HISTORY OF -   7/00    Upper GI       Social History     Tobacco Use     Smoking status: Current Every Day Smoker     Packs/day: 1.00     Years: 20.00     Pack years: 20.00     Types: Cigarettes     Smokeless tobacco: Never Used   Substance Use Topics     Alcohol use: No     Alcohol/week: 0.0 oz     Family History   Problem Relation Age of Onset     Neurologic Disorder Paternal Grandfather          Current Outpatient Medications   Medication Sig Dispense Refill     pantoprazole (PROTONIX) 40 MG EC tablet TAKE 1 TABLET(40 MG) BY MOUTH DAILY 30 TO 60 MINUTES BEFORE A MEAL 90 tablet 1     venlafaxine (EFFEXOR) 75 MG tablet Take 1 tablet (75 mg) by mouth 2 times daily (with meals) 180 tablet 0     venlafaxine (EFFEXOR-XR) 75 MG 24 hr capsule Take 1 capsule (75 mg) by mouth daily 90 capsule 1     cyclobenzaprine (FLEXERIL) 10 MG tablet Take 1 tablet (10 mg) by mouth 3 times daily as needed for muscle spasms (Patient not taking: Reported on 7/13/2018) 90 tablet 1     HYDROcodone-acetaminophen (NORCO) 5-325 MG per tablet Take 1 tablet by mouth every 6 hours as needed for pain maximum 4 tablet(s) per day (Patient not taking: Reported on 5/9/2018) 20 tablet 0     ondansetron (ZOFRAN) 8 MG tablet Take 1 tablet (8 mg) by mouth every 8 hours as needed for nausea (Patient not taking: Reported on 9/6/2019) 20 tablet 1     Allergies   Allergen Reactions     No  "Known Drug Allergies      Recent Labs   Lab Test 05/09/18  1101 01/18/13  1026 01/11/13  1030 12/06/12  0947   LDL  --   --   --  115   ALT 49 68 121*  --    CR 0.93  --   --  1.02   GFRESTIMATED 88  --   --  81   GFRESTBLACK >90  --   --  >90   POTASSIUM 4.1  --   --  3.9   TSH 4.15*  --   --   --       BP Readings from Last 3 Encounters:   09/06/19 134/82   07/13/18 105/74   06/20/18 122/80    Wt Readings from Last 3 Encounters:   09/06/19 123.4 kg (272 lb)   07/13/18 138.3 kg (305 lb)   06/20/18 142.9 kg (315 lb)             Reviewed and updated as needed this visit by Provider         Review of Systems   ROS COMP: Constitutional, HEENT, cardiovascular, pulmonary, gi and gu systems are negative, except as otherwise noted.      Objective    /82 (Cuff Size: Adult Large)   Pulse 89   Temp 97.9  F (36.6  C) (Tympanic)   Resp 18   Ht 1.854 m (6' 1\")   Wt 123.4 kg (272 lb)   SpO2 100%   BMI 35.89 kg/m    Body mass index is 35.89 kg/m .  Physical Exam   GENERAL: healthy, alert and no distress  NECK: no adenopathy, no asymmetry, masses, or scars and thyroid normal to palpation  RESP: lungs clear to auscultation - no rales, rhonchi or wheezes  CV: regular rate and rhythm, normal S1 S2, no S3 or S4, no murmur, click or rub, no peripheral edema and peripheral pulses strong  ABDOMEN: soft, nontender, no hepatosplenomegaly, no masses and bowel sounds normal  MS: no gross musculoskeletal defects noted, no edema            Assessment & Plan     1. Generalized anxiety disorder  Has been worsening, denies HI/SI, will swtich the med to XR dose, and will also refer him to mental health for BCT  - Lipid panel reflex to direct LDL Fasting  - CBC with platelets  - Comprehensive metabolic panel  - venlafaxine (EFFEXOR-XR) 75 MG 24 hr capsule; Take 1 capsule (75 mg) by mouth daily  Dispense: 90 capsule; Refill: 1  - MENTAL HEALTH REFERRAL  - Adult; Outpatient Treatment; Individual/Couples/Family/Group Therapy/Health " "Psychology; [facility]: [facility] [phone]; We will contact you to schedule the appointment or please call with any questions    2. Gastroesophageal reflux disease without esophagitis  Has been stable with current dose of meds, has no side effect from the meds,   Will keep monitoring   - Lipid panel reflex to direct LDL Fasting  - CBC with platelets  - Comprehensive metabolic panel    3. Abdominal pain, generalized  Has been stable as mentioned above   - pantoprazole (PROTONIX) 40 MG EC tablet; TAKE 1 TABLET(40 MG) BY MOUTH DAILY 30 TO 60 MINUTES BEFORE A MEAL  Dispense: 90 tablet; Refill: 1    4. Social phobia  Mentioned above        Tobacco Cessation:   reports that he has been smoking cigarettes.  He has a 20.00 pack-year smoking history. He has never used smokeless tobacco.        BMI:   Estimated body mass index is 35.89 kg/m  as calculated from the following:    Height as of this encounter: 1.854 m (6' 1\").    Weight as of this encounter: 123.4 kg (272 lb).           FUTURE APPOINTMENTS:       - Follow-up visit in 1 year     No follow-ups on file.    Attila Rodriguez MD  [facility]        "

## 2019-09-06 ENCOUNTER — OFFICE VISIT (OUTPATIENT)
Dept: FAMILY MEDICINE | Facility: CLINIC | Age: 44
End: 2019-09-06
Payer: COMMERCIAL

## 2019-09-06 VITALS
BODY MASS INDEX: 36.05 KG/M2 | HEIGHT: 73 IN | OXYGEN SATURATION: 100 % | SYSTOLIC BLOOD PRESSURE: 134 MMHG | HEART RATE: 89 BPM | TEMPERATURE: 97.9 F | RESPIRATION RATE: 18 BRPM | WEIGHT: 272 LBS | DIASTOLIC BLOOD PRESSURE: 82 MMHG

## 2019-09-06 DIAGNOSIS — F41.1 GENERALIZED ANXIETY DISORDER: Primary | ICD-10-CM

## 2019-09-06 DIAGNOSIS — F40.10 SOCIAL PHOBIA: ICD-10-CM

## 2019-09-06 DIAGNOSIS — R10.84 ABDOMINAL PAIN, GENERALIZED: ICD-10-CM

## 2019-09-06 DIAGNOSIS — K21.9 GASTROESOPHAGEAL REFLUX DISEASE WITHOUT ESOPHAGITIS: ICD-10-CM

## 2019-09-06 LAB
ERYTHROCYTE [DISTWIDTH] IN BLOOD BY AUTOMATED COUNT: 13.3 % (ref 10–15)
HCT VFR BLD AUTO: 47.4 % (ref 40–53)
HGB BLD-MCNC: 15.8 G/DL (ref 13.3–17.7)
MCH RBC QN AUTO: 30.3 PG (ref 26.5–33)
MCHC RBC AUTO-ENTMCNC: 33.3 G/DL (ref 31.5–36.5)
MCV RBC AUTO: 91 FL (ref 78–100)
PLATELET # BLD AUTO: 174 10E9/L (ref 150–450)
RBC # BLD AUTO: 5.21 10E12/L (ref 4.4–5.9)
WBC # BLD AUTO: 7.3 10E9/L (ref 4–11)

## 2019-09-06 PROCEDURE — 90471 IMMUNIZATION ADMIN: CPT | Performed by: FAMILY MEDICINE

## 2019-09-06 PROCEDURE — 80061 LIPID PANEL: CPT | Performed by: FAMILY MEDICINE

## 2019-09-06 PROCEDURE — 85027 COMPLETE CBC AUTOMATED: CPT | Performed by: FAMILY MEDICINE

## 2019-09-06 PROCEDURE — 90715 TDAP VACCINE 7 YRS/> IM: CPT | Performed by: FAMILY MEDICINE

## 2019-09-06 PROCEDURE — 80053 COMPREHEN METABOLIC PANEL: CPT | Performed by: FAMILY MEDICINE

## 2019-09-06 PROCEDURE — 36415 COLL VENOUS BLD VENIPUNCTURE: CPT | Performed by: FAMILY MEDICINE

## 2019-09-06 PROCEDURE — 99214 OFFICE O/P EST MOD 30 MIN: CPT | Mod: 25 | Performed by: FAMILY MEDICINE

## 2019-09-06 RX ORDER — PANTOPRAZOLE SODIUM 40 MG/1
TABLET, DELAYED RELEASE ORAL
Qty: 90 TABLET | Refills: 1 | Status: SHIPPED | OUTPATIENT
Start: 2019-09-06 | End: 2020-03-04

## 2019-09-06 RX ORDER — VENLAFAXINE HYDROCHLORIDE 75 MG/1
75 CAPSULE, EXTENDED RELEASE ORAL DAILY
Qty: 90 CAPSULE | Refills: 1 | Status: SHIPPED | OUTPATIENT
Start: 2019-09-06 | End: 2020-03-04

## 2019-09-06 ASSESSMENT — PATIENT HEALTH QUESTIONNAIRE - PHQ9
5. POOR APPETITE OR OVEREATING: SEVERAL DAYS
SUM OF ALL RESPONSES TO PHQ QUESTIONS 1-9: 6

## 2019-09-06 ASSESSMENT — ANXIETY QUESTIONNAIRES
2. NOT BEING ABLE TO STOP OR CONTROL WORRYING: SEVERAL DAYS
7. FEELING AFRAID AS IF SOMETHING AWFUL MIGHT HAPPEN: SEVERAL DAYS
IF YOU CHECKED OFF ANY PROBLEMS ON THIS QUESTIONNAIRE, HOW DIFFICULT HAVE THESE PROBLEMS MADE IT FOR YOU TO DO YOUR WORK, TAKE CARE OF THINGS AT HOME, OR GET ALONG WITH OTHER PEOPLE: VERY DIFFICULT
6. BECOMING EASILY ANNOYED OR IRRITABLE: SEVERAL DAYS
1. FEELING NERVOUS, ANXIOUS, OR ON EDGE: SEVERAL DAYS
5. BEING SO RESTLESS THAT IT IS HARD TO SIT STILL: SEVERAL DAYS
GAD7 TOTAL SCORE: 7
3. WORRYING TOO MUCH ABOUT DIFFERENT THINGS: SEVERAL DAYS

## 2019-09-06 ASSESSMENT — MIFFLIN-ST. JEOR: SCORE: 2172.66

## 2019-09-06 NOTE — LETTER
September 9, 2019      Kev Gilbert  [address] [facility] [address]        Dear ,    We are writing to inform you of your test results.      You maybe able to check the lab results via [place], it showed your lab results including complete blood cell counts/electrolyte balance and glucose/liver and kidney function were all normal.  Your cholesterol indexes are elevated but are better than last time. Please keep working on life style modification including low fat/carb diet with regular exercise.    Resulted Orders   Lipid panel reflex to direct LDL Fasting   Result Value Ref Range    Cholesterol 190 <200 mg/dL    Triglycerides 77 <150 mg/dL    HDL Cholesterol 38 (L) >39 mg/dL    LDL Cholesterol Calculated 137 (H) <100 mg/dL      Comment:      Above desirable:  100-129 mg/dl  Borderline High:  130-159 mg/dL  High:             160-189 mg/dL  Very high:       >189 mg/dl      Non HDL Cholesterol 152 (H) <130 mg/dL      Comment:      Above Desirable:  130-159 mg/dl  Borderline high:  160-189 mg/dl  High:             190-219 mg/dl  Very high:       >219 mg/dl     CBC with platelets   Result Value Ref Range    WBC 7.3 4.0 - 11.0 10e9/L    RBC Count 5.21 4.4 - 5.9 10e12/L    Hemoglobin 15.8 13.3 - 17.7 g/dL    Hematocrit 47.4 40.0 - 53.0 %    MCV 91 78 - 100 fl    MCH 30.3 26.5 - 33.0 pg    MCHC 33.3 31.5 - 36.5 g/dL    RDW 13.3 10.0 - 15.0 %    Platelet Count 174 150 - 450 10e9/L   Comprehensive metabolic panel   Result Value Ref Range    Sodium 138 133 - 144 mmol/L    Potassium 4.1 3.4 - 5.3 mmol/L    Chloride 109 94 - 109 mmol/L    Carbon Dioxide 27 20 - 32 mmol/L    Anion Gap 2 (L) 3 - 14 mmol/L    Glucose 98 70 - 99 mg/dL    Urea Nitrogen 17 7 - 30 mg/dL    Creatinine 0.85 0.66 - 1.25 mg/dL    GFR Estimate >90 >60 mL/min/[1.73_m2]      Comment:      Non  GFR Calc  Starting 12/18/2018, serum creatinine based estimated GFR (eGFR) will be   calculated using the Chronic Kidney Disease  Epidemiology Collaboration   (CKD-EPI) equation.      GFR Estimate If Black >90 >60 mL/min/[1.73_m2]      Comment:       GFR Calc  Starting 12/18/2018, serum creatinine based estimated GFR (eGFR) will be   calculated using the Chronic Kidney Disease Epidemiology Collaboration   (CKD-EPI) equation.      Calcium 8.4 (L) 8.5 - 10.1 mg/dL    Bilirubin Total 0.5 0.2 - 1.3 mg/dL    Albumin 4.2 3.4 - 5.0 g/dL    Protein Total 7.2 6.8 - 8.8 g/dL    Alkaline Phosphatase 77 40 - 150 U/L    ALT 31 0 - 70 U/L    AST 14 0 - 45 U/L       If you have any questions or concerns, please call the clinic at the number listed above.       Sincerely,        Attila Rodriguez MD

## 2019-09-07 LAB
ALBUMIN SERPL-MCNC: 4.2 G/DL (ref 3.4–5)
ALP SERPL-CCNC: 77 U/L (ref 40–150)
ALT SERPL W P-5'-P-CCNC: 31 U/L (ref 0–70)
ANION GAP SERPL CALCULATED.3IONS-SCNC: 2 MMOL/L (ref 3–14)
AST SERPL W P-5'-P-CCNC: 14 U/L (ref 0–45)
BILIRUB SERPL-MCNC: 0.5 MG/DL (ref 0.2–1.3)
BUN SERPL-MCNC: 17 MG/DL (ref 7–30)
CALCIUM SERPL-MCNC: 8.4 MG/DL (ref 8.5–10.1)
CHLORIDE SERPL-SCNC: 109 MMOL/L (ref 94–109)
CHOLEST SERPL-MCNC: 190 MG/DL
CO2 SERPL-SCNC: 27 MMOL/L (ref 20–32)
CREAT SERPL-MCNC: 0.85 MG/DL (ref 0.66–1.25)
GFR SERPL CREATININE-BSD FRML MDRD: >90 ML/MIN/{1.73_M2}
GLUCOSE SERPL-MCNC: 98 MG/DL (ref 70–99)
HDLC SERPL-MCNC: 38 MG/DL
LDLC SERPL CALC-MCNC: 137 MG/DL
NONHDLC SERPL-MCNC: 152 MG/DL
POTASSIUM SERPL-SCNC: 4.1 MMOL/L (ref 3.4–5.3)
PROT SERPL-MCNC: 7.2 G/DL (ref 6.8–8.8)
SODIUM SERPL-SCNC: 138 MMOL/L (ref 133–144)
TRIGL SERPL-MCNC: 77 MG/DL

## 2019-09-07 ASSESSMENT — ANXIETY QUESTIONNAIRES: GAD7 TOTAL SCORE: 7

## 2020-03-03 DIAGNOSIS — F41.1 GENERALIZED ANXIETY DISORDER: ICD-10-CM

## 2020-03-03 DIAGNOSIS — R10.84 ABDOMINAL PAIN, GENERALIZED: ICD-10-CM

## 2020-03-03 NOTE — TELEPHONE ENCOUNTER
"venlafaxine (EFFEXOR-XR) 75 MG 24 hr capsule  Last Written Prescription Date:  9/6/19  Last Fill Quantity: 90 capsules,  # refills: 1   Last office visit: 9/6/2019 with prescribing provider:  Michael   Future Office Visit:      pantoprazole (PROTONIX) 40 MG EC tablet  Last Written Prescription Date:  9/6/19  Last Fill Quantity: 90 tablets,  # refills: 1   Last office visit: 9/6/2019 with prescribing provider:  Michael   Future Office Visit:        Requested Prescriptions   Pending Prescriptions Disp Refills     venlafaxine (EFFEXOR-XR) 75 MG 24 hr capsule [Pharmacy Med Name: VENLAFAXINE ER 75MG CAPSULES] 90 capsule 1     Sig: TAKE 1 CAPSULE(75 MG) BY MOUTH DAILY       Serotonin-Norepinephrine Reuptake Inhibitors  Passed - 3/3/2020  4:14 PM        Passed - Blood pressure under 140/90 in past 12 months     BP Readings from Last 3 Encounters:   09/06/19 134/82   07/13/18 105/74   06/20/18 122/80                 Passed - Recent (12 mo) or future (30 days) visit within the authorizing provider's specialty     Patient has had an office visit with the authorizing provider or a provider within the authorizing providers department within the previous 12 mos or has a future within next 30 days. See \"Patient Info\" tab in inbasket, or \"Choose Columns\" in Meds & Orders section of the refill encounter.              Passed - Medication is active on med list        Passed - Patient is age 18 or older        Passed - Normal serum creatinine on file in past 12 months     Recent Labs   Lab Test 09/06/19  1056   CR 0.85             pantoprazole (PROTONIX) 40 MG EC tablet [Pharmacy Med Name: PANTOPRAZOLE 40MG TABLETS] 90 tablet 1     Sig: TAKE 1 TABLET(40 MG) BY MOUTH DAILY 30 TO 60 MINUTES BEFORE A MEAL       PPI Protocol Passed - 3/3/2020  4:14 PM        Passed - Not on Clopidogrel (unless Pantoprazole ordered)        Passed - No diagnosis of osteoporosis on record        Passed - Recent (12 mo) or future (30 days) visit within the " "authorizing provider's specialty     Patient has had an office visit with the authorizing provider or a provider within the authorizing providers department within the previous 12 mos or has a future within next 30 days. See \"Patient Info\" tab in inbasket, or \"Choose Columns\" in Meds & Orders section of the refill encounter.              Passed - Medication is active on med list        Passed - Patient is age 18 or older          "

## 2020-03-04 RX ORDER — PANTOPRAZOLE SODIUM 40 MG/1
TABLET, DELAYED RELEASE ORAL
Qty: 90 TABLET | Refills: 1 | Status: SHIPPED | OUTPATIENT
Start: 2020-03-04 | End: 2020-09-01

## 2020-03-04 RX ORDER — VENLAFAXINE HYDROCHLORIDE 75 MG/1
CAPSULE, EXTENDED RELEASE ORAL
Qty: 90 CAPSULE | Refills: 1 | Status: SHIPPED | OUTPATIENT
Start: 2020-03-04 | End: 2020-09-01

## 2020-08-31 DIAGNOSIS — R10.84 ABDOMINAL PAIN, GENERALIZED: ICD-10-CM

## 2020-08-31 DIAGNOSIS — F41.1 GENERALIZED ANXIETY DISORDER: ICD-10-CM

## 2020-09-01 RX ORDER — PANTOPRAZOLE SODIUM 40 MG/1
TABLET, DELAYED RELEASE ORAL
Qty: 90 TABLET | Refills: 0 | Status: SHIPPED | OUTPATIENT
Start: 2020-09-01 | End: 2020-11-29

## 2020-09-01 RX ORDER — VENLAFAXINE HYDROCHLORIDE 75 MG/1
CAPSULE, EXTENDED RELEASE ORAL
Qty: 90 CAPSULE | Refills: 0 | Status: SHIPPED | OUTPATIENT
Start: 2020-09-01 | End: 2020-09-16

## 2020-09-01 NOTE — TELEPHONE ENCOUNTER
Medication is being filled for 1 time refill only due to:  Patient needs to be seen because needs physical and med check.     Екатерина MEZA RN  EP Triage

## 2020-09-16 ENCOUNTER — OFFICE VISIT (OUTPATIENT)
Dept: FAMILY MEDICINE | Facility: CLINIC | Age: 45
End: 2020-09-16
Payer: COMMERCIAL

## 2020-09-16 VITALS
SYSTOLIC BLOOD PRESSURE: 132 MMHG | OXYGEN SATURATION: 97 % | HEART RATE: 88 BPM | WEIGHT: 315 LBS | TEMPERATURE: 96.7 F | BODY MASS INDEX: 44.59 KG/M2 | DIASTOLIC BLOOD PRESSURE: 85 MMHG

## 2020-09-16 DIAGNOSIS — F41.1 GENERALIZED ANXIETY DISORDER: ICD-10-CM

## 2020-09-16 DIAGNOSIS — R22.1 NECK MASS: Primary | ICD-10-CM

## 2020-09-16 PROCEDURE — 99214 OFFICE O/P EST MOD 30 MIN: CPT | Performed by: FAMILY MEDICINE

## 2020-09-16 RX ORDER — PENICILLIN V POTASSIUM 500 MG/1
TABLET, FILM COATED ORAL
COMMUNITY
Start: 2020-09-09 | End: 2023-02-20

## 2020-09-16 RX ORDER — VENLAFAXINE HYDROCHLORIDE 75 MG/1
CAPSULE, EXTENDED RELEASE ORAL
Qty: 90 CAPSULE | Refills: 3 | Status: SHIPPED | OUTPATIENT
Start: 2020-09-16 | End: 2021-11-24

## 2020-09-16 NOTE — PROGRESS NOTES
Subjective     Kev Gilbert is a 44 year old male who presents to clinic today for the following health issues:    HPI       Anxiety Follow-Up    How are you doing with your anxiety since your last visit? Stable     Are you having other symptoms that might be associated with anxiety? No    Have you had a significant life event? No     Are you feeling depressed? No    Do you have any concerns with your use of alcohol or other drugs? No    Social History     Tobacco Use     Smoking status: Current Every Day Smoker     Packs/day: 1.00     Years: 20.00     Pack years: 20.00     Types: Cigarettes     Smokeless tobacco: Never Used   Substance Use Topics     Alcohol use: No     Alcohol/week: 0.0 standard drinks     Drug use: No     MATTY-7 SCORE 2/24/2017 5/9/2018 9/6/2019   Total Score - - -   Total Score 0 3 7     PHQ 2/24/2017 5/9/2018 9/6/2019   PHQ-9 Total Score 2 6 6   Q9: Thoughts of better off dead/self-harm past 2 weeks Not at all Not at all Not at all     Last PHQ-9 9/6/2019   1.  Little interest or pleasure in doing things 0   2.  Feeling down, depressed, or hopeless 1   3.  Trouble falling or staying asleep, or sleeping too much 1   4.  Feeling tired or having little energy 2   5.  Poor appetite or overeating 1   6.  Feeling bad about yourself 0   7.  Trouble concentrating 1   8.  Moving slowly or restless 0   Q9: Thoughts of better off dead/self-harm past 2 weeks 0   PHQ-9 Total Score 6   Difficulty at work, home, or with people Very difficult     MATTY-7  9/6/2019   1. Feeling nervous, anxious, or on edge 1   2. Not being able to stop or control worrying 1   3. Worrying too much about different things 1   4. Trouble relaxing 1   5. Being so restless that it is hard to sit still 1   6. Becoming easily annoyed or irritable 1   7. Feeling afraid, as if something awful might happen 1   MATTY-7 Total Score 7   If you checked any problems, how difficult have they made it for you to do your work, take care of things at  home, or get along with other people? Very difficult         How many servings of fruits and vegetables do you eat daily?  2-3    On average, how many sweetened beverages do you drink each day (Examples: soda, juice, sweet tea, etc.  Do NOT count diet or artificially sweetened beverages)?   0    How many days per week do you exercise enough to make your heart beat faster? 3 or less    How many minutes a day do you exercise enough to make your heart beat faster? 9 or less    How many days per week do you miss taking your medication? 0      Review of Systems   Constitutional, HEENT, cardiovascular, pulmonary, gi and gu systems are negative, except as otherwise noted.      Objective    Pulse 88   Temp 96.7  F (35.9  C) (Tympanic)   Wt (!) 153.3 kg (338 lb)   SpO2 97%   BMI 44.59 kg/m    Body mass index is 44.59 kg/m .  Physical Exam   GENERAL: healthy, alert and no distress  NECK: left anterior cervical adenopathy vs cystic mass around parotid gland, no asymmetry, masses, or scars and thyroid normal to palpation  RESP: lungs clear to auscultation - no rales, rhonchi or wheezes  CV: regular rate and rhythm, normal S1 S2, no S3 or S4, no murmur, click or rub, no peripheral edema and peripheral pulses strong  ABDOMEN: soft, nontender, no hepatosplenomegaly, no masses and bowel sounds normal  MS: no gross musculoskeletal defects noted, no edema            Assessment & Plan     Generalized anxiety disorder  Stable with current dose of meds, has no side from the meds, will keep monitoring with current dose   - venlafaxine (EFFEXOR-XR) 75 MG 24 hr capsule; TAKE 1 CAPSULE(75 MG) BY MOUTH DAILY    Neck mass  palpable mass next left parotid gland anterior lobe, not tender, nor sign of infection   Will have him to check US for further evaluation , will consider referral to specialist if indicated   - US Head Neck Soft Tissue; Future     Tobacco Cessation:   reports that he has been smoking cigarettes. He has a 20.00 pack-year  smoking history. He has never used smokeless tobacco.          FUTURE APPOINTMENTS:       - Follow-up visit in 6 months for CPE    No follow-ups on file.    Attila Rodriguez MD  [facility]

## 2020-09-17 ASSESSMENT — ANXIETY QUESTIONNAIRES
2. NOT BEING ABLE TO STOP OR CONTROL WORRYING: NOT AT ALL
7. FEELING AFRAID AS IF SOMETHING AWFUL MIGHT HAPPEN: NOT AT ALL
6. BECOMING EASILY ANNOYED OR IRRITABLE: NOT AT ALL
3. WORRYING TOO MUCH ABOUT DIFFERENT THINGS: NOT AT ALL
GAD7 TOTAL SCORE: 0
5. BEING SO RESTLESS THAT IT IS HARD TO SIT STILL: NOT AT ALL
1. FEELING NERVOUS, ANXIOUS, OR ON EDGE: NOT AT ALL

## 2020-09-17 ASSESSMENT — PATIENT HEALTH QUESTIONNAIRE - PHQ9
SUM OF ALL RESPONSES TO PHQ QUESTIONS 1-9: 0
5. POOR APPETITE OR OVEREATING: NOT AT ALL

## 2020-09-18 ASSESSMENT — ANXIETY QUESTIONNAIRES: GAD7 TOTAL SCORE: 0

## 2020-10-19 ENCOUNTER — HOSPITAL ENCOUNTER (OUTPATIENT)
Dept: ULTRASOUND IMAGING | Facility: CLINIC | Age: 45
Discharge: HOME | End: 2020-10-19
Attending: FAMILY MEDICINE | Admitting: FAMILY MEDICINE
Payer: COMMERCIAL

## 2020-10-19 DIAGNOSIS — R22.1 NECK MASS: ICD-10-CM

## 2020-10-19 PROCEDURE — 76536 US EXAM OF HEAD AND NECK: CPT

## 2020-11-29 DIAGNOSIS — R10.84 ABDOMINAL PAIN, GENERALIZED: ICD-10-CM

## 2020-11-29 RX ORDER — PANTOPRAZOLE SODIUM 40 MG/1
TABLET, DELAYED RELEASE ORAL
Qty: 90 TABLET | Refills: 0 | Status: SHIPPED | OUTPATIENT
Start: 2020-11-29 | End: 2021-02-27

## 2020-11-29 NOTE — TELEPHONE ENCOUNTER
Prescription approved per [facility] [place].    Екатерина MEZA RN  EP Triage

## 2021-02-02 ENCOUNTER — OFFICE VISIT (OUTPATIENT)
Dept: FAMILY MEDICINE | Facility: CLINIC | Age: 46
End: 2021-02-02
Payer: COMMERCIAL

## 2021-02-02 VITALS
HEIGHT: 73 IN | BODY MASS INDEX: 41.75 KG/M2 | OXYGEN SATURATION: 96 % | WEIGHT: 315 LBS | HEART RATE: 91 BPM | SYSTOLIC BLOOD PRESSURE: 134 MMHG | DIASTOLIC BLOOD PRESSURE: 76 MMHG | TEMPERATURE: 97.5 F

## 2021-02-02 DIAGNOSIS — I88.9 CERVICAL LYMPHADENITIS: Primary | ICD-10-CM

## 2021-02-02 DIAGNOSIS — K04.7 DENTAL INFECTION: ICD-10-CM

## 2021-02-02 PROCEDURE — 99213 OFFICE O/P EST LOW 20 MIN: CPT | Performed by: FAMILY MEDICINE

## 2021-02-02 RX ORDER — CHLORHEXIDINE GLUCONATE ORAL RINSE 1.2 MG/ML
15 SOLUTION DENTAL 2 TIMES DAILY
Qty: 500 ML | Refills: 0 | Status: SHIPPED | OUTPATIENT
Start: 2021-02-02 | End: 2023-01-11

## 2021-02-02 ASSESSMENT — MIFFLIN-ST. JEOR: SCORE: 2458.41

## 2021-02-27 DIAGNOSIS — R10.84 ABDOMINAL PAIN, GENERALIZED: ICD-10-CM

## 2021-02-27 RX ORDER — PANTOPRAZOLE SODIUM 40 MG/1
TABLET, DELAYED RELEASE ORAL
Qty: 90 TABLET | Refills: 3 | Status: SHIPPED | OUTPATIENT
Start: 2021-02-27 | End: 2022-02-18

## 2021-11-24 ENCOUNTER — VIRTUAL VISIT (OUTPATIENT)
Dept: FAMILY MEDICINE | Facility: CLINIC | Age: 46
End: 2021-11-24
Payer: COMMERCIAL

## 2021-11-24 DIAGNOSIS — E66.01 MORBID OBESITY (H): ICD-10-CM

## 2021-11-24 DIAGNOSIS — J01.90 ACUTE SINUSITIS WITH SYMPTOMS > 10 DAYS: Primary | ICD-10-CM

## 2021-11-24 DIAGNOSIS — F41.1 GENERALIZED ANXIETY DISORDER: ICD-10-CM

## 2021-11-24 PROCEDURE — 99214 OFFICE O/P EST MOD 30 MIN: CPT | Mod: 95 | Performed by: FAMILY MEDICINE

## 2021-11-24 RX ORDER — CEPHALEXIN 500 MG/1
500 CAPSULE ORAL 3 TIMES DAILY
Qty: 21 CAPSULE | Refills: 0 | Status: SHIPPED | OUTPATIENT
Start: 2021-11-24 | End: 2023-01-11

## 2021-11-24 RX ORDER — VENLAFAXINE HYDROCHLORIDE 75 MG/1
CAPSULE, EXTENDED RELEASE ORAL
Qty: 90 CAPSULE | Refills: 3 | Status: SHIPPED | OUTPATIENT
Start: 2021-11-24 | End: 2022-11-08

## 2021-11-24 NOTE — PROGRESS NOTES
"Kev is a 46 year old who is being evaluated via a billable telephone visit.      What phone number would you like to be contacted at? [phone]  How would you like to obtain your AVS? Mail a copy    Assessment & Plan     Generalized anxiety disorder  Stable continue monitoring   - venlafaxine (EFFEXOR-XR) 75 MG 24 hr capsule; TAKE 1 CAPSULE(75 MG) BY MOUTH DAILY    Acute sinusitis with symptoms > 10 days  Worsening with sinus tenderness and ear ache, has no fever  Will have him to start abx  - cephALEXin (KEFLEX) 500 MG capsule; Take 1 capsule (500 mg) by mouth 3 times daily    Morbid obesity (H)  Not improving   Will review the lab and update pt  Encouraged him to continue working on life style modification including low fat/carb diet with regular exercise.  - Lipid panel reflex to direct LDL Fasting; Future  - Comprehensive metabolic panel (BMP + Alb, Alk Phos, ALT, AST, Total. Bili, TP); Future           Tobacco Cessation:   reports that he has been smoking cigarettes. He has a 20.00 pack-year smoking history. He has never used smokeless tobacco.  Tobacco Cessation Action Plan: Information offered: Patient not interested at this time    BMI:   Estimated body mass index is 44.49 kg/m  as calculated from the following:    Height as of 2/2/21: 1.854 m (6' 1\").    Weight as of 2/2/21: 153 kg (337 lb 3.2 oz).   Weight management plan: Discussed healthy diet and exercise guidelines    FUTURE APPOINTMENTS:       - Follow-up visit in 1 year for CPE    No follow-ups on file.    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      Review of Systems   Constitutional, HEENT, cardiovascular, pulmonary, gi and gu systems are negative, except as otherwise noted.      Objective           Vitals:  No vitals were obtained today due to virtual visit.    Physical Exam   healthy, alert and no distress  PSYCH: Alert and oriented times 3; coherent speech, normal   rate and volume, able to articulate logical thoughts, able   to abstract reason, no tangential thoughts, no hallucinations   or delusions  His affect is normal  RESP: No cough, no audible wheezing, able to talk in full sentences  Remainder of exam unable to be completed due to telephone visits                Phone call duration: 20 minutes

## 2021-12-06 ENCOUNTER — LAB (OUTPATIENT)
Dept: LAB | Facility: CLINIC | Age: 46
End: 2021-12-06
Payer: COMMERCIAL

## 2021-12-06 DIAGNOSIS — E66.01 MORBID OBESITY (H): ICD-10-CM

## 2021-12-06 LAB
ALBUMIN SERPL-MCNC: 3.7 G/DL (ref 3.4–5)
ALP SERPL-CCNC: 96 U/L (ref 40–150)
ALT SERPL W P-5'-P-CCNC: 37 U/L (ref 0–70)
ANION GAP SERPL CALCULATED.3IONS-SCNC: 6 MMOL/L (ref 3–14)
AST SERPL W P-5'-P-CCNC: 16 U/L (ref 0–45)
BILIRUB SERPL-MCNC: 0.2 MG/DL (ref 0.2–1.3)
BUN SERPL-MCNC: 20 MG/DL (ref 7–30)
CALCIUM SERPL-MCNC: 8.7 MG/DL (ref 8.5–10.1)
CHLORIDE BLD-SCNC: 106 MMOL/L (ref 94–109)
CO2 SERPL-SCNC: 26 MMOL/L (ref 20–32)
CREAT SERPL-MCNC: 1 MG/DL (ref 0.66–1.25)
GFR SERPL CREATININE-BSD FRML MDRD: 89 ML/MIN/1.73M2
GLUCOSE BLD-MCNC: 93 MG/DL (ref 70–99)
POTASSIUM BLD-SCNC: 3.7 MMOL/L (ref 3.4–5.3)
PROT SERPL-MCNC: 7.4 G/DL (ref 6.8–8.8)
SODIUM SERPL-SCNC: 138 MMOL/L (ref 133–144)

## 2021-12-06 PROCEDURE — 80061 LIPID PANEL: CPT

## 2021-12-06 PROCEDURE — 80053 COMPREHEN METABOLIC PANEL: CPT

## 2021-12-06 PROCEDURE — 36415 COLL VENOUS BLD VENIPUNCTURE: CPT

## 2021-12-06 PROCEDURE — 83721 ASSAY OF BLOOD LIPOPROTEIN: CPT | Mod: 59

## 2021-12-15 DIAGNOSIS — E78.2 MIXED HYPERLIPIDEMIA: Primary | ICD-10-CM

## 2021-12-15 LAB
CHOLEST SERPL-MCNC: 223 MG/DL
FASTING STATUS PATIENT QL REPORTED: NO
HDLC SERPL-MCNC: 33 MG/DL
LDLC SERPL CALC-MCNC: 151 MG/DL
LDLC SERPL CALC-MCNC: ABNORMAL MG/DL
NONHDLC SERPL-MCNC: 190 MG/DL
TRIGL SERPL-MCNC: 436 MG/DL

## 2021-12-15 RX ORDER — ATORVASTATIN CALCIUM 20 MG/1
20 TABLET, FILM COATED ORAL DAILY
Qty: 90 TABLET | Refills: 1 | Status: SHIPPED | OUTPATIENT
Start: 2021-12-15 | End: 2023-02-20

## 2022-02-18 DIAGNOSIS — R10.84 ABDOMINAL PAIN, GENERALIZED: ICD-10-CM

## 2022-02-18 RX ORDER — PANTOPRAZOLE SODIUM 40 MG/1
TABLET, DELAYED RELEASE ORAL
Qty: 90 TABLET | Refills: 2 | Status: SHIPPED | OUTPATIENT
Start: 2022-02-18 | End: 2022-11-08

## 2022-02-18 NOTE — TELEPHONE ENCOUNTER
Prescription approved per [employer] Refill Protocol.  Papo Mckay RN  [facility] Triage Nurse

## 2022-11-06 DIAGNOSIS — R10.84 ABDOMINAL PAIN, GENERALIZED: ICD-10-CM

## 2022-11-06 DIAGNOSIS — F41.1 GENERALIZED ANXIETY DISORDER: ICD-10-CM

## 2022-11-06 NOTE — LETTER
November 16, 2022      Kev Gilbert  [address] [facility] [address]        Ivan Angulo,    Our records indicate that it is time to schedule a visit with your primary care provider.  You are due to be seen for a routine annual preventative visit.  We have sent to the pharmacy a 3 month refill of your medication until you can be seen by your provider.  You may call [phone] to schedule or via [place] using the appointment tab.    If you are no longer a [facility] patient; please contact us and let us know that as well.  You will need to let the pharmacy know the name of your new provider so that they can send future refill requests to them.    Thank you,    [facility] - Amelia Zavala

## 2022-11-08 RX ORDER — PANTOPRAZOLE SODIUM 40 MG/1
TABLET, DELAYED RELEASE ORAL
Qty: 90 TABLET | Refills: 0 | Status: SHIPPED | OUTPATIENT
Start: 2022-11-08 | End: 2023-04-11

## 2022-11-08 RX ORDER — VENLAFAXINE HYDROCHLORIDE 75 MG/1
CAPSULE, EXTENDED RELEASE ORAL
Qty: 90 CAPSULE | Refills: 0 | Status: SHIPPED | OUTPATIENT
Start: 2022-11-08 | End: 2023-02-20

## 2022-11-08 NOTE — TELEPHONE ENCOUNTER
Routing refill request to provider for review/approval because:  Patient needs to be seen because it has been more than 1 year since last office visit.    Roselia Cody RN

## 2022-11-09 NOTE — TELEPHONE ENCOUNTER
Due for med check/CPE and fasting lab, 3 month supplies sent, please help him to schedule       thx

## 2023-01-11 ENCOUNTER — OFFICE VISIT (OUTPATIENT)
Dept: FAMILY MEDICINE | Facility: CLINIC | Age: 48
End: 2023-01-11
Payer: OTHER MISCELLANEOUS

## 2023-01-11 VITALS
RESPIRATION RATE: 18 BRPM | HEART RATE: 44 BPM | TEMPERATURE: 97.2 F | OXYGEN SATURATION: 96 % | HEIGHT: 73 IN | SYSTOLIC BLOOD PRESSURE: 147 MMHG | WEIGHT: 315 LBS | BODY MASS INDEX: 41.75 KG/M2 | DIASTOLIC BLOOD PRESSURE: 94 MMHG

## 2023-01-11 DIAGNOSIS — M54.9 UPPER BACK PAIN: ICD-10-CM
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
## 2023-01-11 DIAGNOSIS — E66.01 MORBID OBESITY (H): ICD-10-CM

## 2023-01-11 DIAGNOSIS — Z12.11 SCREEN FOR COLON CANCER: ICD-10-CM

## 2023-01-11 PROCEDURE — 99214 OFFICE O/P EST MOD 30 MIN: CPT | Performed by: FAMILY MEDICINE

## 2023-01-11 RX ORDER — CYCLOBENZAPRINE HCL 10 MG
TABLET ORAL
COMMUNITY
Start: 2023-01-07 | End: 2023-01-11

## 2023-01-11 RX ORDER — CYCLOBENZAPRINE HCL 10 MG
TABLET ORAL
Qty: 21 TABLET | Refills: 0 | Status: SHIPPED | OUTPATIENT
Start: 2023-01-11 | End: 2023-04-11

## 2023-01-11 RX ORDER — HYDROCODONE BITARTRATE AND ACETAMINOPHEN 5; 325 MG/1; MG/1
TABLET ORAL
COMMUNITY
Start: 2023-01-07 | End: 2023-02-20

## 2023-01-11 RX ORDER — CELECOXIB 100 MG/1
100 CAPSULE ORAL 2 TIMES DAILY
Qty: 20 CAPSULE | Refills: 0 | Status: SHIPPED | OUTPATIENT
Start: 2023-01-11 | End: 2023-02-20

## 2023-01-11 ASSESSMENT — PAIN SCALES - GENERAL: PAINLEVEL: MODERATE PAIN (4)

## 2023-01-11 NOTE — PROGRESS NOTES
"  Assessment & Plan     Screen for colon cancer      Morbid obesity (H)  Hasn't gone better, encouraged him to continue working on life style modification including low fat/carb diet with regular exercise     Fall, subsequent encounter  Did ER visit 1-6-23, still has pain with tenderness on left upper back and shoulder   Encouraged PT, pt declined  Muscle relaxant and NSAIDs refilled  Work excuse letter written as noted in chart   - cyclobenzaprine (FLEXERIL) 10 MG tablet; TAKE 1 TABLET BY MOUTH THREE TIMES DAILY AS NEEDED MUSCLE SPASMS.  - celecoxib (CELEBREX) 100 MG capsule; Take 1 capsule (100 mg) by mouth 2 times daily    Upper back pain  mentioned above   - cyclobenzaprine (FLEXERIL) 10 MG tablet; TAKE 1 TABLET BY MOUTH THREE TIMES DAILY AS NEEDED MUSCLE SPASMS.  - celecoxib (CELEBREX) 100 MG capsule; Take 1 capsule (100 mg) by mouth 2 times daily        Nicotine/Tobacco Cessation:  He reports that he has been smoking cigarettes. He has a 20.00 pack-year smoking history. He has never used smokeless tobacco.  Nicotine/Tobacco Cessation Plan:   Information offered: Patient not interested at this time      BMI:   Estimated body mass index is 46.18 kg/m  as calculated from the following:    Height as of this encounter: 1.854 m (6' 1\").    Weight as of this encounter: 158.8 kg (350 lb).   Weight management plan: Discussed healthy diet and exercise guidelines    FUTURE APPOINTMENTS:       - Follow-up visit in 3 months for lipid and obesity     No follow-ups on file.    Attila Rodriguez MD  [facility] ODALYS Angulo is a 47 year old presenting for the following health issues:  Hospital F/U      [facility]     ED/UC Followup:    Facility:  [facility]  Date of visit: 01/06/2023  Reason for visit: Fall  Current Status: Still experiencing pain. Muscle relaxer does help.        Review of Systems   Constitutional, HEENT, cardiovascular, pulmonary, gi and gu systems are " "negative, except as otherwise noted.      Objective    BP (!) 147/94   Pulse (!) 44   Temp 97.2  F (36.2  C) (Temporal)   Resp 18   Ht 1.854 m (6' 1\")   Wt (!) 158.8 kg (350 lb)   SpO2 96%   BMI 46.18 kg/m    Body mass index is 46.18 kg/m .  Physical Exam   GENERAL: healthy, alert and no distress  NECK: no adenopathy, no asymmetry, masses, or scars and thyroid normal to palpation  RESP: lungs clear to auscultation - no rales, rhonchi or wheezes  CV: regular rate and rhythm, normal S1 S2, no S3 or S4, no murmur, click or rub, no peripheral edema and peripheral pulses strong  ABDOMEN: soft, nontender, no hepatosplenomegaly, no masses and bowel sounds normal  MS: no gross musculoskeletal defects noted, no edema                    "

## 2023-02-07 ENCOUNTER — VIRTUAL VISIT (OUTPATIENT)
Dept: FAMILY MEDICINE | Facility: CLINIC | Age: 48
End: 2023-02-07
Payer: COMMERCIAL

## 2023-02-07 DIAGNOSIS — L08.9 INFECTED CYST OF SKIN: Primary | ICD-10-CM

## 2023-02-07 DIAGNOSIS — L72.9 INFECTED CYST OF SKIN: Primary | ICD-10-CM

## 2023-02-07 PROCEDURE — 99207 PR NO CHARGE LOS: CPT | Performed by: FAMILY MEDICINE

## 2023-02-07 RX ORDER — CLINDAMYCIN HCL 150 MG
150 CAPSULE ORAL 3 TIMES DAILY
Qty: 15 CAPSULE | Refills: 0 | Status: SHIPPED | OUTPATIENT
Start: 2023-02-07 | End: 2023-02-12

## 2023-02-07 NOTE — PROGRESS NOTES
Kev is a 47 year old who is being evaluated via a billable telephone visit.      What phone number would you like to be contacted at? [phone]  How would you like to obtain your AVS? Mail a copy    Distant Location (provider location):  On-site    Assessment & Plan     Infected cyst of skin  Kev Gilbert is a 47 year old male who presents today for concern of infected cyst secondary to ingrown hair in the groin area   He reports painful cyst  in the left groin started couple days ago. It has been there for years but got swollen over the couple days   No previous hx of I&D   He requested antibiotics to help with the infection   He does not  know how big it is but roughly olive size ?2-3 cm   He can not see   He reports t is not painful but annoying   I had a long discussion with the patient about his condition , diagnosis treatment options. We discussed diffenetial diagnosis, and expected course.  We discussed the limitation of phone visit and lack of full evaluation and physical exam. patient is aware and agreeable     - clindamycin (CLEOCIN) 150 MG capsule; Take 1 capsule (150 mg) by mouth 3 times daily for 5 days  Use warm compress   If not better in 2 days please go to the clinic . Urgent care or ER for further evaluation and management - he might need work up to r/o other conditions like skin mass , cancer etc.  Instructions regarding  diagnosis, treatment options and plan of care reviewed with the patient.  Written instructions provided in after visit summary and reviewed.  Patient instructed to return to the clinic for new or persistent symptoms.   Red flag symptoms and warning signes reviewed and patient has been instructed to go to the Ed ot call 911 if experience any of these   Please contact pharmacy for medication questions.  Patient instructed to take medications as directed on package.    Continue other medications as previously prescribed.  Patient verbalized understanding and agreed on the plan  of care. All questions answered.                  No follow-ups on file.    Renee Ho MD  [facility] CLAUDIA Angulo is a 47 year old, presenting for the following health issues:  Sore (Spot is hard and swollen where leg meets groin/Started getting fever )    HPI     Painful in the left groin started couple days ago. It has been there for years but got swollen over the couple days   No previous hx of I&D   He requested antibiotics to help with the infection   He does not  know how big it is but roughly olive size ?2-3 cm   He can not see   He reports t is not painful but annoying           Review of Systems         Objective           Vitals:  No vitals were obtained today due to virtual visit.    Physical Exam   healthy, alert and no distress  PSYCH: Alert and oriented times 3; coherent speech, normal   rate and volume, able to articulate logical thoughts, able   to abstract reason, no tangential thoughts, no hallucinations   or delusions  His affect is normal  RESP: No cough, no audible wheezing, able to talk in full sentences  Remainder of exam unable to be completed due to telephone visits              Phone call duration: 8 minutes

## 2023-02-08 ENCOUNTER — OFFICE VISIT (OUTPATIENT)
Dept: FAMILY MEDICINE | Facility: CLINIC | Age: 48
End: 2023-02-08
Payer: COMMERCIAL

## 2023-02-08 VITALS
TEMPERATURE: 98 F | DIASTOLIC BLOOD PRESSURE: 82 MMHG | HEART RATE: 112 BPM | OXYGEN SATURATION: 97 % | SYSTOLIC BLOOD PRESSURE: 136 MMHG | RESPIRATION RATE: 22 BRPM

## 2023-02-08 DIAGNOSIS — E66.01 MORBID OBESITY (H): ICD-10-CM

## 2023-02-08 DIAGNOSIS — L03.314 CELLULITIS OF GROIN: Primary | ICD-10-CM

## 2023-02-08 DIAGNOSIS — F41.1 GENERALIZED ANXIETY DISORDER: ICD-10-CM

## 2023-02-08 DIAGNOSIS — F17.200 SMOKER: ICD-10-CM

## 2023-02-08 LAB
BASOPHILS # BLD AUTO: 0 10E3/UL (ref 0–0.2)
BASOPHILS NFR BLD AUTO: 0 %
EOSINOPHIL # BLD AUTO: 0.1 10E3/UL (ref 0–0.7)
EOSINOPHIL NFR BLD AUTO: 1 %
ERYTHROCYTE [DISTWIDTH] IN BLOOD BY AUTOMATED COUNT: 13.4 % (ref 10–15)
ERYTHROCYTE [SEDIMENTATION RATE] IN BLOOD BY WESTERGREN METHOD: 6 MM/HR (ref 0–15)
HCT VFR BLD AUTO: 49.3 % (ref 40–53)
HGB BLD-MCNC: 16.6 G/DL (ref 13.3–17.7)
LYMPHOCYTES # BLD AUTO: 1.8 10E3/UL (ref 0.8–5.3)
LYMPHOCYTES NFR BLD AUTO: 15 %
MCH RBC QN AUTO: 30.3 PG (ref 26.5–33)
MCHC RBC AUTO-ENTMCNC: 33.7 G/DL (ref 31.5–36.5)
MCV RBC AUTO: 90 FL (ref 78–100)
MONOCYTES # BLD AUTO: 1.3 10E3/UL (ref 0–1.3)
MONOCYTES NFR BLD AUTO: 11 %
NEUTROPHILS # BLD AUTO: 9.1 10E3/UL (ref 1.6–8.3)
NEUTROPHILS NFR BLD AUTO: 73 %
PLATELET # BLD AUTO: 165 10E3/UL (ref 150–450)
RBC # BLD AUTO: 5.48 10E6/UL (ref 4.4–5.9)
WBC # BLD AUTO: 12.4 10E3/UL (ref 4–11)

## 2023-02-08 PROCEDURE — 85025 COMPLETE CBC W/AUTO DIFF WBC: CPT | Performed by: FAMILY MEDICINE

## 2023-02-08 PROCEDURE — 86140 C-REACTIVE PROTEIN: CPT | Performed by: FAMILY MEDICINE

## 2023-02-08 PROCEDURE — 96372 THER/PROPH/DIAG INJ SC/IM: CPT | Performed by: FAMILY MEDICINE

## 2023-02-08 PROCEDURE — 36415 COLL VENOUS BLD VENIPUNCTURE: CPT | Performed by: FAMILY MEDICINE

## 2023-02-08 PROCEDURE — 99214 OFFICE O/P EST MOD 30 MIN: CPT | Mod: 25 | Performed by: FAMILY MEDICINE

## 2023-02-08 PROCEDURE — 85652 RBC SED RATE AUTOMATED: CPT | Performed by: FAMILY MEDICINE

## 2023-02-08 RX ORDER — DOXYCYCLINE 100 MG/1
100 CAPSULE ORAL 2 TIMES DAILY
Qty: 20 CAPSULE | Refills: 0 | Status: SHIPPED | OUTPATIENT
Start: 2023-02-08 | End: 2023-12-28

## 2023-02-08 RX ORDER — CEFTRIAXONE 1 G/1
1000 INJECTION, POWDER, FOR SOLUTION INTRAMUSCULAR; INTRAVENOUS ONCE
Qty: 10 ML | Refills: 0 | OUTPATIENT
Start: 2023-02-08 | End: 2023-02-08

## 2023-02-08 RX ORDER — CEFTRIAXONE SODIUM 1 G
1 VIAL (EA) INJECTION ONCE
Status: COMPLETED | OUTPATIENT
Start: 2023-02-08 | End: 2023-02-08

## 2023-02-08 RX ADMIN — Medication 1 G: at 14:05

## 2023-02-08 ASSESSMENT — PAIN SCALES - GENERAL: PAINLEVEL: MILD PAIN (2)

## 2023-02-08 NOTE — LETTER
February 9, 2023      Kev Gilbert  [address] [facility] [address]        Dear ,    We are writing to inform you of your test results.    Your blood cell counts finding indicates infection and cellulitis as was noted at your visit. Please continue taking prescribed antibiotics and plan to visit ER if not improving within next 3 days.       Resulted Orders   ESR: Erythrocyte sedimentation rate   Result Value Ref Range    Erythrocyte Sedimentation Rate 6 0 - 15 mm/hr   CRP, inflammation   Result Value Ref Range    CRP Inflammation 48.90 (H) <5.00 mg/L   CBC with platelets and differential   Result Value Ref Range    WBC Count 12.4 (H) 4.0 - 11.0 10e3/uL    RBC Count 5.48 4.40 - 5.90 10e6/uL    Hemoglobin 16.6 13.3 - 17.7 g/dL    Hematocrit 49.3 40.0 - 53.0 %    MCV 90 78 - 100 fL    MCH 30.3 26.5 - 33.0 pg    MCHC 33.7 31.5 - 36.5 g/dL    RDW 13.4 10.0 - 15.0 %    Platelet Count 165 150 - 450 10e3/uL    % Neutrophils 73 %    % Lymphocytes 15 %    % Monocytes 11 %    % Eosinophils 1 %    % Basophils 0 %    Absolute Neutrophils 9.1 (H) 1.6 - 8.3 10e3/uL    Absolute Lymphocytes 1.8 0.8 - 5.3 10e3/uL    Absolute Monocytes 1.3 0.0 - 1.3 10e3/uL    Absolute Eosinophils 0.1 0.0 - 0.7 10e3/uL    Absolute Basophils 0.0 0.0 - 0.2 10e3/uL       If you have any questions or concerns, please call the clinic at the number listed above.       Sincerely,      Attila Rodriguez MD

## 2023-02-08 NOTE — PROGRESS NOTES
Assessment & Plan     Cellulitis of groin  On left groin area, started clindamycin yesterday but not helping  Has mild elevated Temp  Will have him to switch abx to doxycycline, rocephin 1gm given today   - cefTRIAXone (ROCEPHIN) in NS for IM administration 1 g  - cefTRIAXone (ROCEPHIN) 1 GM vial; Inject 1 g (1,000 mg) into the muscle once for 1 dose  - CBC with platelets and differential; Future  - ESR: Erythrocyte sedimentation rate; Future  - CRP, inflammation; Future    Morbid obesity (H)  Has no change, has poor hygiene in skin fold area, seem it could cause him having groin cellulitis     Generalized anxiety disorder  Has been stable  Keep monitoring     Smoker  Not changed, has current cellulitis, encouraged him to work on smoking cessation          FUTURE APPOINTMENTS:       - Follow-up visit in 1 week if not improving     No follow-ups on file.    Attila Rodriguez MD  [facility] ODALYS Angulo is a 47 year old presenting for the following health issues:  Derm Problem (Left leg by groin )      History of Present Illness       Reason for visit:  Infected subacious cyst  Symptom onset:  1-3 days ago  Symptoms include:  Discomfort in area and fever  Symptom intensity:  Mild  Symptom progression:  Staying the same  Had these symptoms before:  No  What makes it worse:  No  What makes it better:  No    He eats 2-3 servings of fruits and vegetables daily.He consumes 5 sweetened beverage(s) daily.He exercises with enough effort to increase his heart rate 9 or less minutes per day.  He exercises with enough effort to increase his heart rate 3 or less days per week.   He is taking medications regularly.      Review of Systems   Constitutional, HEENT, cardiovascular, pulmonary, gi and gu systems are negative, except as otherwise noted.      Objective    /82 (BP Location: Left arm, Patient Position: Sitting, Cuff Size: Adult Large)   Pulse 112   Temp 98  F (36.7  C) (Temporal)    Resp 22   SpO2 97%   There is no height or weight on file to calculate BMI.  Physical Exam   GENERAL: healthy, alert and no distress  NECK: no adenopathy, no asymmetry, masses, or scars and thyroid normal to palpation  RESP: lungs clear to auscultation - no rales, rhonchi or wheezes  CV: regular rate and rhythm, normal S1 S2, no S3 or S4, no murmur, click or rub, no peripheral edema and peripheral pulses strong  ABDOMEN: soft, nontender, no hepatosplenomegaly, no masses and bowel sounds normal  MS: no gross musculoskeletal defects noted, no edema

## 2023-02-09 LAB — CRP SERPL-MCNC: 48.9 MG/L

## 2023-02-16 ENCOUNTER — TELEPHONE (OUTPATIENT)
Dept: FAMILY MEDICINE | Facility: CLINIC | Age: 48
End: 2023-02-16
Payer: COMMERCIAL

## 2023-02-16 DIAGNOSIS — E11.9 NEW ONSET TYPE 2 DIABETES MELLITUS (H): Primary | ICD-10-CM

## 2023-02-16 NOTE — TELEPHONE ENCOUNTER
Group Therapy Note    Date: 9/14/2020    Group Start Time: 0900  Group End Time: 0250  Group Topic: Community Meeting    [facility]    [place] Mars, [address]        Group Therapy Note    Attendees: 5/19         Pt did not participate in Community Meeting/Goals Group at 900am when encouraged by RT due to resting in room. Pt was offered talk time as an alternative to group but declined.       Discipline Responsible: Psychoeducational Specialist      Signature:  Burna Fabry Pt calling to schedule with a Diabetes Educator, no order on file.  POrders required for Katelin Ed. Please advise.     [facility] Specialist  Diabetes & Nutrition Scheduling  [phone]

## 2023-02-16 NOTE — TELEPHONE ENCOUNTER
Patient in [place]   .    On  2/10/2023 , he developed a groin abscess . Abscess came  open and he went to the ER.     Surgery Sunday to clean and debride the area. Surgery on Monday for wound vac.     Patient is planning to be discharged from the hospital on Thursday.     He was told he is diabetic in the hospital. He is asking for a diabetic referral.     Patient plans to keep his clinic appointment with you on Monday.       Patient encourage to ask for diabetic  consult in the hospital.     Please advise.       Lorrie Castaneda RN  [facility]

## 2023-02-20 ENCOUNTER — OFFICE VISIT (OUTPATIENT)
Dept: FAMILY MEDICINE | Facility: CLINIC | Age: 48
End: 2023-02-20
Payer: COMMERCIAL

## 2023-02-20 ENCOUNTER — TELEPHONE (OUTPATIENT)
Dept: FAMILY MEDICINE | Facility: CLINIC | Age: 48
End: 2023-02-20

## 2023-02-20 VITALS
HEART RATE: 98 BPM | TEMPERATURE: 98 F | RESPIRATION RATE: 21 BRPM | BODY MASS INDEX: 46.18 KG/M2 | HEIGHT: 73 IN | DIASTOLIC BLOOD PRESSURE: 88 MMHG | OXYGEN SATURATION: 98 % | SYSTOLIC BLOOD PRESSURE: 146 MMHG

## 2023-02-20 DIAGNOSIS — E66.01 MORBID OBESITY (H): ICD-10-CM

## 2023-02-20 DIAGNOSIS — F41.1 GENERALIZED ANXIETY DISORDER: ICD-10-CM

## 2023-02-20 DIAGNOSIS — R10.84 ABDOMINAL PAIN, GENERALIZED: ICD-10-CM

## 2023-02-20 DIAGNOSIS — L03.314 CELLULITIS OF GROIN: ICD-10-CM

## 2023-02-20 DIAGNOSIS — E11.9 NEW ONSET TYPE 2 DIABETES MELLITUS (H): Primary | ICD-10-CM

## 2023-02-20 DIAGNOSIS — F41.0 PANIC ATTACK: ICD-10-CM

## 2023-02-20 DIAGNOSIS — M79.89 NECROTIZING SOFT TISSUE INFECTION: ICD-10-CM

## 2023-02-20 PROBLEM — R73.9 HYPERGLYCEMIA: Status: ACTIVE | Noted: 2023-02-11

## 2023-02-20 PROBLEM — L03.90 CELLULITIS: Status: ACTIVE | Noted: 2023-02-11

## 2023-02-20 PROBLEM — E87.20 METABOLIC ACIDOSIS: Status: ACTIVE | Noted: 2023-02-11

## 2023-02-20 PROBLEM — F17.210 NICOTINE DEPENDENCE, CIGARETTES, UNCOMPLICATED: Status: ACTIVE | Noted: 2023-02-11

## 2023-02-20 PROBLEM — L02.214 ABSCESS OF LEFT GROIN: Status: ACTIVE | Noted: 2023-02-11

## 2023-02-20 LAB
ALBUMIN SERPL BCG-MCNC: 3.8 G/DL (ref 3.5–5.2)
ALP SERPL-CCNC: 89 U/L (ref 40–129)
ALT SERPL W P-5'-P-CCNC: 32 U/L (ref 10–50)
ANION GAP SERPL CALCULATED.3IONS-SCNC: 13 MMOL/L (ref 7–15)
AST SERPL W P-5'-P-CCNC: 23 U/L (ref 10–50)
BILIRUB SERPL-MCNC: 0.2 MG/DL
BUN SERPL-MCNC: 12.9 MG/DL (ref 6–20)
CALCIUM SERPL-MCNC: 9.5 MG/DL (ref 8.6–10)
CHLORIDE SERPL-SCNC: 103 MMOL/L (ref 98–107)
CREAT SERPL-MCNC: 1.17 MG/DL (ref 0.67–1.17)
CREAT UR-MCNC: 53.2 MG/DL
CRP SERPL-MCNC: 21.5 MG/L
DEPRECATED HCO3 PLAS-SCNC: 23 MMOL/L (ref 22–29)
ERYTHROCYTE [DISTWIDTH] IN BLOOD BY AUTOMATED COUNT: 13.2 % (ref 10–15)
ERYTHROCYTE [SEDIMENTATION RATE] IN BLOOD BY WESTERGREN METHOD: 12 MM/HR (ref 0–15)
GFR SERPL CREATININE-BSD FRML MDRD: 76 ML/MIN/1.73M2
GLUCOSE SERPL-MCNC: 217 MG/DL (ref 70–99)
HCT VFR BLD AUTO: 45 % (ref 40–53)
HGB BLD-MCNC: 14.9 G/DL (ref 13.3–17.7)
MCH RBC QN AUTO: 30.3 PG (ref 26.5–33)
MCHC RBC AUTO-ENTMCNC: 33.1 G/DL (ref 31.5–36.5)
MCV RBC AUTO: 92 FL (ref 78–100)
MICROALBUMIN UR-MCNC: <12 MG/L
MICROALBUMIN/CREAT UR: NORMAL MG/G{CREAT}
PLATELET # BLD AUTO: 287 10E3/UL (ref 150–450)
POTASSIUM SERPL-SCNC: 4 MMOL/L (ref 3.4–5.3)
PROT SERPL-MCNC: 7 G/DL (ref 6.4–8.3)
RBC # BLD AUTO: 4.91 10E6/UL (ref 4.4–5.9)
SODIUM SERPL-SCNC: 139 MMOL/L (ref 136–145)
WBC # BLD AUTO: 9.9 10E3/UL (ref 4–11)

## 2023-02-20 PROCEDURE — 85027 COMPLETE CBC AUTOMATED: CPT | Performed by: FAMILY MEDICINE

## 2023-02-20 PROCEDURE — 86140 C-REACTIVE PROTEIN: CPT | Performed by: FAMILY MEDICINE

## 2023-02-20 PROCEDURE — 80053 COMPREHEN METABOLIC PANEL: CPT | Performed by: FAMILY MEDICINE

## 2023-02-20 PROCEDURE — 82570 ASSAY OF URINE CREATININE: CPT | Performed by: FAMILY MEDICINE

## 2023-02-20 PROCEDURE — 85652 RBC SED RATE AUTOMATED: CPT | Performed by: FAMILY MEDICINE

## 2023-02-20 PROCEDURE — 99495 TRANSJ CARE MGMT MOD F2F 14D: CPT | Performed by: FAMILY MEDICINE

## 2023-02-20 PROCEDURE — 36415 COLL VENOUS BLD VENIPUNCTURE: CPT | Performed by: FAMILY MEDICINE

## 2023-02-20 PROCEDURE — 82043 UR ALBUMIN QUANTITATIVE: CPT | Performed by: FAMILY MEDICINE

## 2023-02-20 RX ORDER — OXYMETAZOLINE HYDROCHLORIDE 0.05 G/100ML
1 SPRAY NASAL EVERY 4 HOURS
COMMUNITY

## 2023-02-20 RX ORDER — BLOOD SUGAR DIAGNOSTIC
STRIP MISCELLANEOUS
COMMUNITY
Start: 2023-02-15 | End: 2023-04-11

## 2023-02-20 RX ORDER — PANTOPRAZOLE SODIUM 40 MG/1
40 TABLET, DELAYED RELEASE ORAL DAILY
Qty: 90 TABLET | Refills: 1 | Status: SHIPPED | OUTPATIENT
Start: 2023-02-20 | End: 2023-08-15

## 2023-02-20 RX ORDER — HYDROXYZINE HYDROCHLORIDE 25 MG/1
25 TABLET, FILM COATED ORAL EVERY 8 HOURS PRN
Qty: 30 TABLET | Refills: 0 | Status: SHIPPED | OUTPATIENT
Start: 2023-02-20 | End: 2024-02-19

## 2023-02-20 RX ORDER — LANCETS
EACH MISCELLANEOUS
COMMUNITY
Start: 2023-02-15 | End: 2024-02-22

## 2023-02-20 RX ORDER — INSULIN GLARGINE 100 [IU]/ML
INJECTION, SOLUTION SUBCUTANEOUS
COMMUNITY
Start: 2023-02-17 | End: 2023-02-20

## 2023-02-20 RX ORDER — INSULIN GLARGINE 100 [IU]/ML
30 INJECTION, SOLUTION SUBCUTANEOUS AT BEDTIME
Qty: 15 ML | COMMUNITY
Start: 2023-02-20 | End: 2023-02-23

## 2023-02-20 RX ORDER — OXYCODONE HYDROCHLORIDE 5 MG/1
5 TABLET ORAL EVERY 6 HOURS PRN
Qty: 36 TABLET | Refills: 0 | Status: SHIPPED | OUTPATIENT
Start: 2023-02-20 | End: 2023-04-11

## 2023-02-20 RX ORDER — POLYETHYLENE GLYCOL 3350 17 G/17G
POWDER, FOR SOLUTION ORAL
COMMUNITY
Start: 2023-02-17 | End: 2024-04-24

## 2023-02-20 RX ORDER — PANTOPRAZOLE SODIUM 40 MG/1
40 TABLET, DELAYED RELEASE ORAL
COMMUNITY
Start: 2022-11-09 | End: 2023-02-20

## 2023-02-20 RX ORDER — DIPHENHYDRAMINE HCL 25 MG
25 CAPSULE ORAL EVERY 4 HOURS
COMMUNITY
Start: 2023-02-18

## 2023-02-20 RX ORDER — DOXYCYCLINE HYCLATE 100 MG
100 TABLET ORAL
COMMUNITY
Start: 2023-02-17 | End: 2023-02-26

## 2023-02-20 RX ORDER — ACETAMINOPHEN 500 MG
1000 TABLET ORAL
COMMUNITY
Start: 2023-02-17

## 2023-02-20 RX ORDER — OXYCODONE HYDROCHLORIDE 5 MG/1
5-10 TABLET ORAL
COMMUNITY
Start: 2023-02-17 | End: 2023-02-20

## 2023-02-20 RX ORDER — VENLAFAXINE HYDROCHLORIDE 75 MG/1
CAPSULE, EXTENDED RELEASE ORAL
Qty: 90 CAPSULE | Refills: 1 | Status: SHIPPED | OUTPATIENT
Start: 2023-02-20 | End: 2023-08-15

## 2023-02-20 ASSESSMENT — PAIN SCALES - GENERAL: PAINLEVEL: SEVERE PAIN (6)

## 2023-02-20 NOTE — PROGRESS NOTES
Assessment & Plan     Generalized anxiety disorder  Worsening with recent admission, will have him to add hydroxyzine  Will continue monitoring and plan to have him consider BCT with therapist   - venlafaxine (EFFEXOR XR) 75 MG 24 hr capsule; TAKE 1 CAPSULE(75 MG) BY MOUTH DAILY  - hydrOXYzine (ATARAX) 25 MG tablet; Take 1 tablet (25 mg) by mouth every 8 hours as needed for anxiety    New onset type 2 diabetes mellitus (H)  Has been diagnosed at the recent admission, basal insulin and meal time sliding scale insulin started at the admission, and stopped sliding scale insulin at discharge, will keep him on basal insulin(30 units), and started care with DM educator   Will review the lab and updated pt  Encouraged him to RTC in 2 months   - CBC with platelets; Future  - Comprehensive metabolic panel (BMP + Alb, Alk Phos, ALT, AST, Total. Bili, TP); Future  - CRP, inflammation; Future  - ESR: Erythrocyte sedimentation rate; Future  - Albumin Random Urine Quantitative with Creat Ratio; Future  - AMB Adult Diabetes Educator Referral; Future  - LANTUS SOLOSTAR 100 UNIT/ML soln; Inject 30 Units Subcutaneous At Bedtime    Necrotizing soft tissue infection  Still has lots of pain at wound care, currently working with home care nurse, encouraged him to watch sx and f/u with surgeon, the info is given,    Ok to take  Pain meds as needed at wound care   - oxyCODONE (ROXICODONE) 5 MG tablet; Take 1 tablet (5 mg) by mouth every 6 hours as needed for severe pain (7-10)  - CBC with platelets; Future  - Comprehensive metabolic panel (BMP + Alb, Alk Phos, ALT, AST, Total. Bili, TP); Future  - CRP, inflammation; Future  - ESR: Erythrocyte sedimentation rate; Future    Cellulitis of groin  Improving, will have him to check lab for f/u  - CBC with platelets; Future  - Comprehensive metabolic panel (BMP + Alb, Alk Phos, ALT, AST, Total. Bili, TP); Future  - CRP, inflammation; Future  - ESR: Erythrocyte sedimentation rate;  "Future    Morbid obesity (H)  Not improving, encouraged lie style modification   - CBC with platelets; Future  - Comprehensive metabolic panel (BMP + Alb, Alk Phos, ALT, AST, Total. Bili, TP); Future  - CRP, inflammation; Future  - ESR: Erythrocyte sedimentation rate; Future    Abdominal pain, generalized  Associated with meds,  Will have him to try PPI   - pantoprazole (PROTONIX) 40 MG EC tablet; Take 1 tablet (40 mg) by mouth daily    Panic attack  Mentioned above   - hydrOXYzine (ATARAX) 25 MG tablet; Take 1 tablet (25 mg) by mouth every 8 hours as needed for anxiety        FUTURE APPOINTMENTS:       - Follow-up visit in 2 months for DM    No follow-ups on file.    Attila Rodriguez MD  [facility] ODALYS Angulo is a 47 year old presenting for the following health issues:  No chief complaint on file.      [facility]       Hospital Follow-up Visit:    Hospital/Nursing Home/IP Rehab Facility: [facility]  Date of Admission: 02/11  Date of Discharge: 02/18  Reason(s) for Admission: cellulitis, severe sepsis, abscess on left groin, Type 2 Diabetes    Was your hospitalization related to COVID-19? No   Problems taking medications regularly:  None  Medication changes since discharge: None  Problems adhering to non-medication therapy:  None    Summary of hospitalization:  CareEverywhere information obtained and reviewed  Diagnostic Tests/Treatments reviewed.  Follow up needed: none  Other Healthcare Providers Involved in Patient s Care:         Homecare  Update since discharge: improved.         Plan of care communicated with patient             Review of Systems   Constitutional, HEENT, cardiovascular, pulmonary, gi and gu systems are negative, except as otherwise noted.      Objective    BP (!) 146/88   Pulse 98   Temp 98  F (36.7  C) (Temporal)   Resp 21   Ht 1.854 m (6' 1\")   SpO2 98%   BMI 46.18 kg/m    Body mass index is 46.18 kg/m .  Physical Exam   GENERAL: " healthy, alert and no distress  NECK: no adenopathy, no asymmetry, masses, or scars and thyroid normal to palpation  RESP: lungs clear to auscultation - no rales, rhonchi or wheezes  CV: regular rate and rhythm, normal S1 S2, no S3 or S4, no murmur, click or rub, no peripheral edema and peripheral pulses strong  ABDOMEN: soft, nontender, no hepatosplenomegaly, no masses and bowel sounds normal  MS: no gross musculoskeletal defects noted, no edema

## 2023-02-20 NOTE — TELEPHONE ENCOUNTER
Received forms from [facility].  Order number [id number]. Benadryl instructions.     Forms in red folder on Dr Rodriguez Desk. Once completed fax to     [facility]

## 2023-02-22 ENCOUNTER — VIRTUAL VISIT (OUTPATIENT)
Dept: EDUCATION SERVICES | Facility: CLINIC | Age: 48
End: 2023-02-22
Payer: COMMERCIAL

## 2023-02-22 ENCOUNTER — TELEPHONE (OUTPATIENT)
Dept: FAMILY MEDICINE | Facility: CLINIC | Age: 48
End: 2023-02-22

## 2023-02-22 DIAGNOSIS — E11.9 NEW ONSET TYPE 2 DIABETES MELLITUS (H): ICD-10-CM

## 2023-02-22 PROCEDURE — G0108 DIAB MANAGE TRN  PER INDIV: HCPCS | Mod: 95 | Performed by: DIETITIAN, REGISTERED

## 2023-02-22 NOTE — LETTER
2/22/2023         RE: Kev Gilbert  [address] [address]        Dear Colleague,    Thank you for referring your patient, Kev Gilbert, to the [facility]. Please see a copy of my visit note below.    Diabetes Self-Management Education & Support    Presents for: Initial Assessment for new diagnosis    Type of Service: Telephone Visit    Originating Location (Patient Location): Home  Distant Location (Provider Location): Home  Mode of Communication:  Telephone    Telephone Visit Start Time: 12:00  Telephone Visit End Time (telephone visit stop time): 12:42    How would patient like to obtain AVS? MyChart    Assessment Type:   ASSESSMENT:  Patient presents for education for a new dx of Type 2 diabetes.  Patient questioning diagnosis and wonders if the glucose elevation is related to his abscess/infection.  Patient states he is injecting Lantus insulin 30 units 2x/day (30-0-0-30).  No longer taking short-acting insulin.  Checking glucose level 1-2x/day and states results are in the 200-260 range.  Has not had any levels <200 since hospital discharge.  Typically eats 3 meals per day.  History of following keto diet and losing 100# (has since regained this).       Patient's most recent No results found for: A1C, HEMOGLOBINA1  na meeting goal of <7.0    Diabetes knowledge and skills assessment:   Patient is knowledgeable in diabetes management concepts related to: Monitoring and Taking Medication    Continue education with the following diabetes management concepts: Healthy Eating, Being Active, Monitoring, Taking Medication, Problem Solving, Reducing Risks and Healthy Coping    Based on learning assessment above, most appropriate setting for further diabetes education would be: Individual setting.      PLAN    Increase Lantus to 35-0-0-35 (15% increase).  Ok to further increase to 40 units BID in 5-7 days if glucose levels continue above target.  Check glucose level 2x/day, or  "more if not feeling well.  Follow carb controlled diet with 30-60gm carb per meal, 0-15gm per snack.  Consider adding Metformin - may discuss with PCP.  Follow-up with Diab Ed with questions or concerns in 2-3 weeks.    Topics to cover at upcoming visits: Problem Solving    Follow-up: per patient    See Care Plan for co-developed, patient-state behavior change goals.  AVS provided for patient today.    Education Materials Provided:  [facility] Understanding Diabetes Booklet and BG Log Sheet      SUBJECTIVE/OBJECTIVE:  Presents for: Initial Assessment for new diagnosis  Accompanied by: Self  Diabetes education in the past 24mo: No  Focus of Visit: Patient Unsure  Diabetes type: Type 2  Other concerns:: None  Cultural Influences/Ethnic Background:  Not  or       Diabetes Symptoms & Complications:     Complications assessed today?: No    Patient Problem List and Family Medical History reviewed for relevant medical history, current medical status, and diabetes risk factors.    Vitals:  There were no vitals taken for this visit.  Estimated body mass index is 46.18 kg/m  as calculated from the following:    Height as of 2/20/23: 1.854 m (6' 1\").    Weight as of 1/11/23: 158.8 kg (350 lb).   Last 3 BP:   BP Readings from Last 3 Encounters:   02/20/23 (!) 146/88   02/08/23 136/82   01/11/23 (!) 147/94       History   Smoking Status     Every Day     Packs/day: 1.00     Years: 20.00     Types: Cigarettes   Smokeless Tobacco     Never       Labs:  No results found for: A1C, HEMOGLOBINA1  Lab Results   Component Value Date     02/20/2023    GLC 93 12/06/2021    GLC 98 09/06/2019     Lab Results   Component Value Date    LDL  12/06/2021      Comment:      Cannot estimate LDL when triglyceride exceeds 400 mg/dL     12/06/2021     09/06/2019     HDL Cholesterol   Date Value Ref Range Status   09/06/2019 38 (L) >39 mg/dL Final     Direct Measure HDL   Date Value Ref Range Status "   2021 33 (L) >=40 mg/dL Final   ]  GFR Estimate   Date Value Ref Range Status   2023 76 >60 mL/min/1.73m2 Final     Comment:     eGFR calculated using  CKD-EPI equation.   2019 >90 >60 mL/min/[1.73_m2] Final     Comment:     Non  GFR Calc  Starting 2018, serum creatinine based estimated GFR (eGFR) will be   calculated using the Chronic Kidney Disease Epidemiology Collaboration   (CKD-EPI) equation.       GFR Estimate If Black   Date Value Ref Range Status   2019 >90 >60 mL/min/[1.73_m2] Final     Comment:      GFR Calc  Starting 2018, serum creatinine based estimated GFR (eGFR) will be   calculated using the Chronic Kidney Disease Epidemiology Collaboration   (CKD-EPI) equation.       Lab Results   Component Value Date    CR 1.17 2023    CR 0.85 2019     No results found for: MICROALBUMIN    Healthy Eating:  Healthy Eating Assessed Today: Yes  Cultural/Taoism diet restrictions?: No  Meal planning/habits: None  Meals include: Breakfast, Lunch, Dinner, Evening Snack  Breakfast: villarreal, eggs, 1 toast, 1/2 cup pineapple  Lunch: sandwich  Dinner: meat (chicken/beef), potato/rice, veggie (broccoli, cauli, carrots)  Snacks: nuts  Beverages: Water  Has patient met with a dietitian in the past?: No    Being Active:  Being Active Assessed Today: No  Exercise:: Currently not exercising  Barrier to exercise: Physical limitation    Monitoring:  Monitoring Assessed Today: Yes  Did patient bring glucose meter to appointment? : Yes  Times checking blood sugar at home (number): 2  Times checking blood sugar at home (per): Day  Blood glucose trend: Other    Fastin-260    Taking Medications:  Diabetes Medication(s)     Insulin       LANTUS SOLOSTAR 100 UNIT/ML soln    Inject 30 Units Subcutaneous At Bedtime          Taking Medication Assessed Today: Yes  Current Treatments: Insulin Injections  Dose schedule: Pre-breakfast, At bedtime  Given  by: Patient  Injection/Infusion sites: Abdomen  Problems taking diabetes medications regularly?: No  Diabetes medication side effects?: No    Problem Solving:  Problem Solving Assessed Today: Yes  Is the patient at risk for hypoglycemia?: Yes  Hypoglycemia Frequency: Never  Is the patient at risk for DKA?: No              Reducing Risks:  Reducing Risks Assessed Today: No  Diabetes Risks: Age over 45 years  CAD Risks: Diabetes Mellitus, Hypertension, Male sex, Obesity, Tobacco exposure    Healthy Coping:  Healthy Coping Assessed Today: Yes  Emotional response to diabetes: Ready to learn  Stage of change: PREPARATION (Decided to change - considering how)  Support resources: None  Patient Activation Measure Survey Score:  SAMAN Score (Last Two) 12/6/2012   SAMAN Raw Score 39   Activation Score 56.4   SAMAN Level 3         Care Plan and Education Provided:  Care Plan: Diabetes   Updates made by Carlie Sanchez RD since 2/22/2023 12:00 AM      Problem: HbA1C Not In Goal       Goal: Establish Regular Follow-Ups with PCP       Task: Discuss with PCP the recommended timing for patient's next follow up visit(s)    Responsible User: Carlie Sanchez RD      Task: Discuss schedule for PCP visits with patient    Responsible User: Carlie Sanchez RD      Goal: Get HbA1C Level in Goal       Task: Educate patient on diabetes education self-management topics    Responsible User: Carlie Sanchez RD      Task: Educate patient on benefits of regular glucose monitoring    Responsible User: Carlie Sanchez RD      Task: Refer patient to appropriate extended care team member, as needed (Medication Therapy Management, Behavioral Health, Physical Therapy, etc.)    Responsible User: Carile Sanchez RD      Task: Discuss diabetes treatment plan with patient    Responsible User: Carlie Sanchez RD      Problem: Diabetes Self-Management Education Needed to Optimize Self-Care Behaviors       Goal: Understand diabetes  pathophysiology and disease progression       Task: Provide education on diabetes pathophysiology and disease progression specfic to patient's diabetes type Completed 2/22/2023   Responsible User: Carlie Sanchez RD      Goal: Healthy Eating - follow a healthy eating pattern for diabetes       Task: Provide education on portion control and consistency in amount, composition and timing of food intake Completed 2/22/2023   Responsible User: Carlie Sanchez RD      Task: Provide education on managing carbohydrate intake (carbohydrate counting, plate planning method, etc.)    Responsible User: Carlie Sanchez RD      Task: Provide education on weight management    Responsible User: Carlie Sanchez RD      Task: Provide education on heart healthy eating    Responsible User: Carlie Sanchez RD      Task: Provide education on eating out    Responsible User: Carlie Sanchez RD      Task: Develop individualized healthy eating plan with patient Completed 2/22/2023   Responsible User: Carlie Sanchez RD      Goal: Being Active - get regular physical activity, working up to at least 150 minutes per week       Task: Provide education on relationship of activity to glucose and precautions to take if at risk for low glucose    Responsible User: Carlie Sanchez RD      Task: Discuss barriers to physical activity with patient    Responsible User: Carlie Sanchez RD      Task: Develop physical activity plan with patient    Responsible User: Carlie Sanchez RD      Task: Explore community resources including walking groups, assistance programs, and home videos    Responsible User: Carlie Sanchez RD      Goal: Monitoring - monitor glucose and ketones as directed    This Visit's Progress: 50%   Note:    I will test my glucose level 2x/day, fasting and post-dinner or bedtime     Task: Provide education on blood glucose monitoring (purpose, proper technique, frequency, glucose targets, interpreting  results, when to use glucose control solution, sharps disposal) Completed 2/22/2023   Responsible User: Carlie Sanchez RD      Task: Provide education on continuous glucose monitoring (sensor placement, use of jessica or /reader, understanding glucose trends, alerts and alarms, differences between sensor glucose and blood glucose)    Responsible User: Carlie Sanchez RD      Task: Provide education on ketone monitoring (when to monitor, frequency, etc.)    Responsible User: Carlie Sanchez RD      Goal: Taking Medication - patient is consistently taking medications as directed       Task: Provide education on action of prescribed medication, including when to take and possible side effects    Responsible User: Carlie Sanchez RD      Task: Provide education on insulin and injectable diabetes medications, including administration, storage, site selection and rotation for injection sites Completed 2/22/2023   Responsible User: Carlie Sanchez RD      Task: Discuss barriers to medication adherence with patient and provide management technique ideas as appropriate    Responsible User: Carlie Sanchez RD      Task: Provide education on frequency and refill details of medications    Responsible User: Carlie Sanchez RD      Goal: Problem Solving - know how to prevent and manage short-term diabetes complications       Task: Provide education on high blood glucose - causes, signs/symptoms, prevention and treatment Completed 2/22/2023   Responsible User: Carlie Sanchez RD      Task: Provide education on low blood glucose - causes, signs/symptoms, prevention, treatment, carrying a carbohydrate source at all times, and medical identification Completed 2/22/2023   Responsible User: Carlie Sanchez RD      Task: Provide education on safe travel with diabetes    Responsible User: Carlie Sanchez RD      Task: Provide education on how to care for diabetes on sick days    Responsible User:  Carlie Sanchez RD      Task: Provide education on when to call a health care provider    Responsible User: Carlie Sanchez RD      Goal: Reducing Risks - know how to prevent and treat long-term diabetes complications       Task: Provide education on major complications of diabetes, prevention, early diagnostic measures and treatment of complications    Responsible User: Carlie Sanchez RD      Task: Provide education on recommended care for dental, eye and foot health Completed 2/22/2023   Responsible User: Carlie Sanchez RD      Task: Provide education on Hemoglobin A1c - goals and relationship to blood glucose levels    Responsible User: Carlie Sanchez RD      Task: Provide education on recommendations for heart health - lipid levels and goals, blood pressure and goals, and aspirin therapy, if indicated    Responsible User: Carlie Sanchez RD      Task: Provide education on tobacco cessation    Responsible User: Carlie Sanchez RD      Goal: Healthy Coping - use available resources to cope with the challenges of managing diabetes       Task: Discuss recognizing feelings about having diabetes    Responsible User: Carlie Sanchez RD      Task: Provide education on the benefits of making appropriate lifestyle changes    Responsible User: Carlie Snachez RD      Task: Provide education on benefits of utilizing support systems    Responsible User: Carlie Sanchez RD      Task: Discuss methods for coping with stress    Responsible User: Carlie Sanchez RD      Task: Provide education on when to seek professional counseling    Responsible User: Carlie Sanchez RD Tami Carpenter, RDN LD [facility]      Time Spent: 42 minutes  Encounter Type: Individual    Any diabetes medication dose changes were made via the CDE Protocol per the patient's referring provider. A copy of this encounter was shared with the provider.

## 2023-02-22 NOTE — PROGRESS NOTES
Diabetes Self-Management Education & Support    Presents for: Initial Assessment for new diagnosis    Type of Service: Telephone Visit    Originating Location (Patient Location): Home  Distant Location (Provider Location): Home  Mode of Communication:  Telephone    Telephone Visit Start Time: 12:00  Telephone Visit End Time (telephone visit stop time): 12:42    How would patient like to obtain AVS? Latosha    Assessment Type:   ASSESSMENT:  Patient presents for education for a new dx of Type 2 diabetes.  Patient questioning diagnosis and wonders if the glucose elevation is related to his abscess/infection.  Patient states he is injecting Lantus insulin 30 units 2x/day (30-0-0-30).  No longer taking short-acting insulin.  Checking glucose level 1-2x/day and states results are in the 200-260 range.  Has not had any levels <200 since hospital discharge.  Typically eats 3 meals per day.  History of following keto diet and losing 100# (has since regained this).       Patient's most recent No results found for: A1C, HEMOGLOBINA1  na meeting goal of <7.0    Diabetes knowledge and skills assessment:   Patient is knowledgeable in diabetes management concepts related to: Monitoring and Taking Medication    Continue education with the following diabetes management concepts: Healthy Eating, Being Active, Monitoring, Taking Medication, Problem Solving, Reducing Risks and Healthy Coping    Based on learning assessment above, most appropriate setting for further diabetes education would be: Individual setting.      PLAN    Increase Lantus to 35-0-0-35 (15% increase).  Ok to further increase to 40 units BID in 5-7 days if glucose levels continue above target.  Check glucose level 2x/day, or more if not feeling well.  Follow carb controlled diet with 30-60gm carb per meal, 0-15gm per snack.  Consider adding Metformin - may discuss with PCP.  Follow-up with Diab Ed with questions or concerns in 2-3 weeks.    Topics to cover at  "upcoming visits: Problem Solving    Follow-up: per patient    See Care Plan for co-developed, patient-state behavior change goals.  AVS provided for patient today.    Education Materials Provided:  [facility] Understanding Diabetes Booklet and BG Log Sheet      SUBJECTIVE/OBJECTIVE:  Presents for: Initial Assessment for new diagnosis  Accompanied by: Self  Diabetes education in the past 24mo: No  Focus of Visit: Patient Unsure  Diabetes type: Type 2  Other concerns:: None  Cultural Influences/Ethnic Background:  Not  or       Diabetes Symptoms & Complications:     Complications assessed today?: No    Patient Problem List and Family Medical History reviewed for relevant medical history, current medical status, and diabetes risk factors.    Vitals:  There were no vitals taken for this visit.  Estimated body mass index is 46.18 kg/m  as calculated from the following:    Height as of 2/20/23: 1.854 m (6' 1\").    Weight as of 1/11/23: 158.8 kg (350 lb).   Last 3 BP:   BP Readings from Last 3 Encounters:   02/20/23 (!) 146/88   02/08/23 136/82   01/11/23 (!) 147/94       History   Smoking Status     Every Day     Packs/day: 1.00     Years: 20.00     Types: Cigarettes   Smokeless Tobacco     Never       Labs:  No results found for: A1C, HEMOGLOBINA1  Lab Results   Component Value Date     02/20/2023    GLC 93 12/06/2021    GLC 98 09/06/2019     Lab Results   Component Value Date    LDL  12/06/2021      Comment:      Cannot estimate LDL when triglyceride exceeds 400 mg/dL     12/06/2021     09/06/2019     HDL Cholesterol   Date Value Ref Range Status   09/06/2019 38 (L) >39 mg/dL Final     Direct Measure HDL   Date Value Ref Range Status   12/06/2021 33 (L) >=40 mg/dL Final   ]  GFR Estimate   Date Value Ref Range Status   02/20/2023 76 >60 mL/min/1.73m2 Final     Comment:     eGFR calculated using 2021 CKD-EPI equation.   09/06/2019 >90 >60 mL/min/[1.73_m2] Final     Comment:     " Non  GFR Calc  Starting 2018, serum creatinine based estimated GFR (eGFR) will be   calculated using the Chronic Kidney Disease Epidemiology Collaboration   (CKD-EPI) equation.       GFR Estimate If Black   Date Value Ref Range Status   2019 >90 >60 mL/min/[1.73_m2] Final     Comment:      GFR Calc  Starting 2018, serum creatinine based estimated GFR (eGFR) will be   calculated using the Chronic Kidney Disease Epidemiology Collaboration   (CKD-EPI) equation.       Lab Results   Component Value Date    CR 1.17 2023    CR 0.85 2019     No results found for: MICROALBUMIN    Healthy Eating:  Healthy Eating Assessed Today: Yes  Cultural/Orthodox diet restrictions?: No  Meal planning/habits: None  Meals include: Breakfast, Lunch, Dinner, Evening Snack  Breakfast: villarreal, eggs, 1 toast, 1/2 cup pineapple  Lunch: sandwich  Dinner: meat (chicken/beef), potato/rice, veggie (broccoli, cauli, carrots)  Snacks: nuts  Beverages: Water  Has patient met with a dietitian in the past?: No    Being Active:  Being Active Assessed Today: No  Exercise:: Currently not exercising  Barrier to exercise: Physical limitation    Monitoring:  Monitoring Assessed Today: Yes  Did patient bring glucose meter to appointment? : Yes  Times checking blood sugar at home (number): 2  Times checking blood sugar at home (per): Day  Blood glucose trend: Other    Fastin-260    Taking Medications:  Diabetes Medication(s)     Insulin       LANTUS SOLOSTAR 100 UNIT/ML soln    Inject 30 Units Subcutaneous At Bedtime          Taking Medication Assessed Today: Yes  Current Treatments: Insulin Injections  Dose schedule: Pre-breakfast, At bedtime  Given by: Patient  Injection/Infusion sites: Abdomen  Problems taking diabetes medications regularly?: No  Diabetes medication side effects?: No    Problem Solving:  Problem Solving Assessed Today: Yes  Is the patient at risk for hypoglycemia?:  Yes  Hypoglycemia Frequency: Never  Is the patient at risk for DKA?: No              Reducing Risks:  Reducing Risks Assessed Today: No  Diabetes Risks: Age over 45 years  CAD Risks: Diabetes Mellitus, Hypertension, Male sex, Obesity, Tobacco exposure    Healthy Coping:  Healthy Coping Assessed Today: Yes  Emotional response to diabetes: Ready to learn  Stage of change: PREPARATION (Decided to change - considering how)  Support resources: None  Patient Activation Measure Survey Score:  SAMAN Score (Last Two) 12/6/2012   SAMAN Raw Score 39   Activation Score 56.4   SAMAN Level 3         Care Plan and Education Provided:  Care Plan: Diabetes   Updates made by Carlie Sanchez RD since 2/22/2023 12:00 AM      Problem: HbA1C Not In Goal       Goal: Establish Regular Follow-Ups with PCP       Task: Discuss with PCP the recommended timing for patient's next follow up visit(s)    Responsible User: Carlie Sanchez RD      Task: Discuss schedule for PCP visits with patient    Responsible User: Carlie Sanchez RD      Goal: Get HbA1C Level in Goal       Task: Educate patient on diabetes education self-management topics    Responsible User: Carlie Sanchez RD      Task: Educate patient on benefits of regular glucose monitoring    Responsible User: Carlie Sanchez RD      Task: Refer patient to appropriate extended care team member, as needed (Medication Therapy Management, Behavioral Health, Physical Therapy, etc.)    Responsible User: Carlie Sanchez RD      Task: Discuss diabetes treatment plan with patient    Responsible User: Carlie Sanchez RD      Problem: Diabetes Self-Management Education Needed to Optimize Self-Care Behaviors       Goal: Understand diabetes pathophysiology and disease progression       Task: Provide education on diabetes pathophysiology and disease progression specfic to patient's diabetes type Completed 2/22/2023   Responsible User: Carlie Sanchez RD      Goal: Healthy Eating -  follow a healthy eating pattern for diabetes       Task: Provide education on portion control and consistency in amount, composition and timing of food intake Completed 2/22/2023   Responsible User: Carlie Sanchez RD      Task: Provide education on managing carbohydrate intake (carbohydrate counting, plate planning method, etc.)    Responsible User: Carlie Sanchez RD      Task: Provide education on weight management    Responsible User: Carlie Sanchez RD      Task: Provide education on heart healthy eating    Responsible User: Carlie Sanchez RD      Task: Provide education on eating out    Responsible User: Carlie Sanchez RD      Task: Develop individualized healthy eating plan with patient Completed 2/22/2023   Responsible User: Carlie Sanchez RD      Goal: Being Active - get regular physical activity, working up to at least 150 minutes per week       Task: Provide education on relationship of activity to glucose and precautions to take if at risk for low glucose    Responsible User: Carlie Sanchez RD      Task: Discuss barriers to physical activity with patient    Responsible User: Carlie Sanchez RD      Task: Develop physical activity plan with patient    Responsible User: Carlie Sanchez RD      Task: Explore community resources including walking groups, assistance programs, and home videos    Responsible User: Carlie Sanchez RD      Goal: Monitoring - monitor glucose and ketones as directed    This Visit's Progress: 50%   Note:    I will test my glucose level 2x/day, fasting and post-dinner or bedtime     Task: Provide education on blood glucose monitoring (purpose, proper technique, frequency, glucose targets, interpreting results, when to use glucose control solution, sharps disposal) Completed 2/22/2023   Responsible User: Carlie Sanchez RD      Task: Provide education on continuous glucose monitoring (sensor placement, use of jessica or /reader, understanding  glucose trends, alerts and alarms, differences between sensor glucose and blood glucose)    Responsible User: Carlie Sanchez RD      Task: Provide education on ketone monitoring (when to monitor, frequency, etc.)    Responsible User: Carlie Sanchez RD      Goal: Taking Medication - patient is consistently taking medications as directed       Task: Provide education on action of prescribed medication, including when to take and possible side effects    Responsible User: Carlie Sanchez RD      Task: Provide education on insulin and injectable diabetes medications, including administration, storage, site selection and rotation for injection sites Completed 2/22/2023   Responsible User: Carlie Sanchez RD      Task: Discuss barriers to medication adherence with patient and provide management technique ideas as appropriate    Responsible User: Carlie Sanchez RD      Task: Provide education on frequency and refill details of medications    Responsible User: Carlie Sanchez RD      Goal: Problem Solving - know how to prevent and manage short-term diabetes complications       Task: Provide education on high blood glucose - causes, signs/symptoms, prevention and treatment Completed 2/22/2023   Responsible User: Carlie Sanchez RD      Task: Provide education on low blood glucose - causes, signs/symptoms, prevention, treatment, carrying a carbohydrate source at all times, and medical identification Completed 2/22/2023   Responsible User: Carlie Sanchez RD      Task: Provide education on safe travel with diabetes    Responsible User: Carlie Sanchez RD      Task: Provide education on how to care for diabetes on sick days    Responsible User: Carlie Sanchez RD      Task: Provide education on when to call a health care provider    Responsible User: Carlei Sanchez RD      Goal: Reducing Risks - know how to prevent and treat long-term diabetes complications       Task: Provide education on  major complications of diabetes, prevention, early diagnostic measures and treatment of complications    Responsible User: Carlie Sanchez RD      Task: Provide education on recommended care for dental, eye and foot health Completed 2/22/2023   Responsible User: Carlie Sanchez RD      Task: Provide education on Hemoglobin A1c - goals and relationship to blood glucose levels    Responsible User: Carlie Sanchez RD      Task: Provide education on recommendations for heart health - lipid levels and goals, blood pressure and goals, and aspirin therapy, if indicated    Responsible User: Carlie Sanchez RD      Task: Provide education on tobacco cessation    Responsible User: Carlie Sanchez RD      Goal: Healthy Coping - use available resources to cope with the challenges of managing diabetes       Task: Discuss recognizing feelings about having diabetes    Responsible User: Carlie Sanchez RD      Task: Provide education on the benefits of making appropriate lifestyle changes    Responsible User: Carlie Sanchez RD      Task: Provide education on benefits of utilizing support systems    Responsible User: Carlie Sanchez RD      Task: Discuss methods for coping with stress    Responsible User: Carlie Sanchez RD      Task: Provide education on when to seek professional counseling    Responsible User: Carlie Sanchez RD Tami Carpenter, RDN LD [facility]      Time Spent: 42 minutes  Encounter Type: Individual    Any diabetes medication dose changes were made via the CDE Protocol per the patient's referring provider. A copy of this encounter was shared with the provider.

## 2023-02-22 NOTE — TELEPHONE ENCOUNTER
[facility] Order number [id number].  Surgical Wound, left groin    Forms in red folder on Dr Rodriguez desk. Once completed fax to     Minerva Blue/Tina-  [facility]

## 2023-02-22 NOTE — PATIENT INSTRUCTIONS
MY DIABETES TODAY:    1)  Goal A1C is under <7.0  Mine is:    No results found for: A1C    2)  Goal LDL (bad cholesterol) under 100  (measured at least yearly)- I am currently at:   Lab Results   Component Value Date    LDL  12/06/2021      Comment:      Cannot estimate LDL when triglyceride exceeds 400 mg/dL     12/06/2021     09/06/2019       3)  Goal blood pressure under 130/80- mine was Data Unavailable today    Care Plan:  Increase Lantus to 35-0-0-35 (15% increase).  Ok to further increase to 40 units BID in 5-7 days if glucose levels continue above target.  Check glucose level 2x/day, or more if not feeling well.  Follow carb controlled diet with 30-60gm carb per meal, 0-15gm per snack.  Consider adding Metformin - may discuss with PCP.  Follow-up with Diab Ed with questions or concerns in 2-3 weeks.    Follow up:  Call ([phone]), e-mail ([email or web address]), or send MyChart message with questions, concerns or if follow-up is needed.     Bring blood glucose meter and logbook with you to all doctor and follow-up appointments.     [facility] for the [facility]:  For Your Diabetes or Nutrition Education Appointments Call:  [phone]   For Diabetes or Nutrition Related Questions:   [phone]  Send MyChart Message   If you need a medication refill please contact your pharmacy. Please allow 3 business days for your refills to be completed.

## 2023-02-23 DIAGNOSIS — E11.9 NEW ONSET TYPE 2 DIABETES MELLITUS (H): ICD-10-CM

## 2023-02-23 NOTE — TELEPHONE ENCOUNTER
Medication Question or Refill        What medication are you calling about (include dose and sig)?: LANTUS SOLOSTAR 100 UNIT/ML soln         Preferred Pharmacy:    [id number] - [place], [place] - [address] AT [facility]  [address] [address]  Phone: [phone] Fax: [phone]      Controlled Substance Agreement on file:   CSA -- Patient Level:    CSA: None found at the patient level.       Who prescribed the medication?: Rodriguez    Do you need a refill? Yes    When did you use the medication last? unknown    Patient offered an appointment? No    Do you have any questions or concerns?  Yes: requesting needles as well that go with Lantus soln      Could we send this information to you in [facility] or would you prefer to receive a phone call?:   Patient would prefer a phone call   Okay to leave a detailed message?: Yes at Cell number on file:    Telephone Information:   Mobile [phone]         Manju ALVES    [facility]

## 2023-02-24 RX ORDER — INSULIN GLARGINE 100 [IU]/ML
30 INJECTION, SOLUTION SUBCUTANEOUS AT BEDTIME
Qty: 30 ML | Refills: 1 | Status: SHIPPED | OUTPATIENT
Start: 2023-02-24 | End: 2023-04-03

## 2023-02-28 DIAGNOSIS — E11.9 NEW ONSET TYPE 2 DIABETES MELLITUS (H): Primary | ICD-10-CM

## 2023-02-28 NOTE — TELEPHONE ENCOUNTER
Reached out to pt to clarify what insulin pen needles he needs. Only lancets are on current med list. BD auto shield duo. He states 30G and .[id number], cannot find order for this size. Also reached out to pharmacy for clarification. Pharmacists states to place a general insulin pen needle order and state substitution allowed. Pharmacy pended.    Zoya MAZA RN  [facility]

## 2023-02-28 NOTE — TELEPHONE ENCOUNTER
Medication Question or Refill    Contacts       Type Contact Phone/Fax    02/28/2023 01:49 PM CST Phone (Incoming) Kev Gilbert (Self) [phone] (H)          What medication are you calling about (include dose and sig)?: Needles BD AUTO SHEILD DUO    Preferred Pharmacy:   [facility]  [place]  [address] [place], [place] [address] [address]  Phone: [phone] Fax: [phone]    [id number] [place] [facility] [address] AT [facility]  [address]  [facility] [address]  Phone: [phone] Fax: [phone]      Controlled Substance Agreement on file:   CSA -- Patient Level:    CSA: None found at the patient level.       Who prescribed the medication?: Dr. Rodriguez    Do you need a refill? Yes    When did you use the medication last? N/a Don't have needles    Patient offered an appointment? No    Do you have any questions or concerns?  No      Could we send this information to you in [facility] or would you prefer to receive a phone call?:   Patient would prefer a phone call   Okay to leave a detailed message?: Yes at Cell number on file:    Telephone Information:   Mobile [phone]

## 2023-03-01 NOTE — TELEPHONE ENCOUNTER
Pt mom calling to check the status. No consent to communicate on file, unable to give information.     Minerva Blue/Tina-  [facility]

## 2023-03-01 NOTE — TELEPHONE ENCOUNTER
[id number] - [place] - [address] AT [facility] [address]  [address] [place] [address]  Phone: [phone] Fax: [phone]    Pt mother states that the [facility] is no longer operational.

## 2023-03-13 ENCOUNTER — TELEPHONE (OUTPATIENT)
Dept: FAMILY MEDICINE | Facility: CLINIC | Age: 48
End: 2023-03-13
Payer: COMMERCIAL

## 2023-03-13 NOTE — TELEPHONE ENCOUNTER
S/w pt who states he was told by pharmacy that they did not have RX for Lantus. Called pharmacy who states it was accidentally removed from system. Writer gave verbal order for current order which was started on 2/24/23.     Pt scheduled for 4/11 with PCP. Pt told to keep this appt per last OV recommendation.  Pt had vv with Diab Ed on 2/22/23. Pt told to contact Diab Ed in 2-3 weeks with any questions per appt note. Pt given DM educator number to call with with further question.    Lizbeth WEEKS RN  [facility] Triage Team

## 2023-03-13 NOTE — TELEPHONE ENCOUNTER
Pt states his RX for Lantus is cancelled. Can someone check into this?       Pt scheduled 4/11 with PCP as chart states to follow up in two months (April) Pt did not want to be seen sooner, he thought he was suppose to follow up in 3 weeks.     Minerva Blue/Tina-  [facility]

## 2023-03-13 NOTE — TELEPHONE ENCOUNTER
Reason for Call:  Appointment Request and URGENT MED REFILL    Patient requesting this type of appt: Chronic Diease Management/Medication/Follow-Up    Requested provider: Attila Rodriguez    Reason patient unable to be scheduled: Not within requested timeframe    When does patient want to be seen/preferred time: 3-7 days    Comments: Patient was told to follow up in 3 weeks about 2.5 weeks ago, so will be due this week/beginning of next week. There is no openings in schedule until May. Patient is hoping to be worked in as soon as possible. Also needing med refill by tomorrow if possible- will be out of insulin by tomorrow.     Could we send this information to you in [place] or would you prefer to receive a phone call?:   Patient would prefer a phone call   Okay to leave a detailed message?: No at Home number on file [phone] (home)    Call taken on 3/13/2023 at 8:10 AM by Krista Brewster        Medication Question or Refill    Contacts       Type Contact Phone/Fax    03/13/2023 08:10 AM CDT Phone (Incoming) Kev Gilbert (Self) [phone] (H)          What medication are you calling about (include dose and sig)?: LANTIS INSULIN PEN    Preferred Pharmacy:    [id number] - [place] - [address] AT [facility]  [address] [place] [address]  Phone: [phone] Fax: [phone]      Controlled Substance Agreement on file:   CSA -- Patient Level:    CSA: None found at the patient level.       Who prescribed the medication?: IN HOSPITAL END OF February, UNSURE OF PROVIDER NAME    Do you need a refill? Yes    When did you use the medication last? Used every day, last used 3/13/23    Patient offered an appointment? Yes: none available until may    Do you have any questions or concerns?  No      Could we send this information to you in [place] or would you prefer to receive a phone call?:   Patient would prefer a phone call   Okay to leave a detailed message?: No at Home  number on file [phone] (home)

## 2023-03-21 ENCOUNTER — TELEPHONE (OUTPATIENT)
Dept: FAMILY MEDICINE | Facility: CLINIC | Age: 48
End: 2023-03-21
Payer: COMMERCIAL

## 2023-03-21 NOTE — TELEPHONE ENCOUNTER
The Home Care/Assisted Living/Nursing Facility is calling regarding an established patient.  Has the patient seen Home Care in the past or is currently residing in Assisted Living or Nursing Facility? Yes.     LAVINIA Shah calling from [facility] requesting the following orders that are within the Home Care, Assisted Living or Nursing Home Eval and Treatment standing order and can be signed as standing order signature required by RN.    Preferred Call Back Number: [phone] (confidential)    Home Care Visits Continuation: Patient's wound vac was discontinued so home care is decreasing visits to 1 visit a week for 2 weeks to make sure the wound continues to heal.    Any additional Orders:  Are there any orders requested, not stated above, that are outside of the standing order and must be routed to a licensed practitioner for approval?    No    Writer has verified Requestor will send fax to have orders signed.    Roselia MARTINEZ RN  [facility] Triage Team

## 2023-03-22 ENCOUNTER — TELEPHONE (OUTPATIENT)
Dept: FAMILY MEDICINE | Facility: CLINIC | Age: 48
End: 2023-03-22
Payer: COMMERCIAL

## 2023-03-22 NOTE — TELEPHONE ENCOUNTER
Forms/Letter Request    Type [facility] form/letter: [facility]    Have you been seen for this request: No    Do we have the form/letter: Yes    When is form/letter needed by: when able    How would you like the form/letter returned: Fax [phone]    Placed in red folder, and put on Dr Rodriguez's desk.    Manju ALVES    [facility]

## 2023-04-05 ENCOUNTER — TELEPHONE (OUTPATIENT)
Dept: FAMILY MEDICINE | Facility: CLINIC | Age: 48
End: 2023-04-05
Payer: COMMERCIAL

## 2023-04-05 NOTE — TELEPHONE ENCOUNTER
Forms/Letter Request    Type of form/letter: [facility]    Have you been seen for this request: No    Do we have the form/letter: Yes    Who is the form from? Home care    Where did/will the form come from? form was faxed in    When is form/letter needed by: when able    How would you like the form/letter returned: Fax : [phone]    Placed in red folder, and put on Dr Rodriguez's desk.    Manju ALVES    [facility]

## 2023-04-11 ENCOUNTER — OFFICE VISIT (OUTPATIENT)
Dept: FAMILY MEDICINE | Facility: CLINIC | Age: 48
End: 2023-04-11
Payer: COMMERCIAL

## 2023-04-11 VITALS
DIASTOLIC BLOOD PRESSURE: 86 MMHG | WEIGHT: 315 LBS | BODY MASS INDEX: 41.75 KG/M2 | HEIGHT: 73 IN | SYSTOLIC BLOOD PRESSURE: 148 MMHG | RESPIRATION RATE: 22 BRPM | OXYGEN SATURATION: 98 % | HEART RATE: 94 BPM | TEMPERATURE: 97.7 F

## 2023-04-11 DIAGNOSIS — E11.40 TYPE 2 DIABETES MELLITUS WITH DIABETIC NEUROPATHY, WITH LONG-TERM CURRENT USE OF INSULIN (H): ICD-10-CM

## 2023-04-11 DIAGNOSIS — Z79.4 TYPE 2 DIABETES MELLITUS WITH DIABETIC NEUROPATHY, WITH LONG-TERM CURRENT USE OF INSULIN (H): ICD-10-CM

## 2023-04-11 DIAGNOSIS — I10 BENIGN ESSENTIAL HYPERTENSION: ICD-10-CM

## 2023-04-11 DIAGNOSIS — E66.01 MORBID OBESITY (H): Primary | ICD-10-CM

## 2023-04-11 DIAGNOSIS — Z12.11 SCREEN FOR COLON CANCER: ICD-10-CM

## 2023-04-11 DIAGNOSIS — E78.2 MIXED HYPERLIPIDEMIA: ICD-10-CM

## 2023-04-11 DIAGNOSIS — E11.9 NEW ONSET TYPE 2 DIABETES MELLITUS (H): ICD-10-CM

## 2023-04-11 LAB
ALBUMIN SERPL BCG-MCNC: 4.1 G/DL (ref 3.5–5.2)
ALP SERPL-CCNC: 95 U/L (ref 40–129)
ALT SERPL W P-5'-P-CCNC: 25 U/L (ref 10–50)
ANION GAP SERPL CALCULATED.3IONS-SCNC: 12 MMOL/L (ref 7–15)
AST SERPL W P-5'-P-CCNC: 22 U/L (ref 10–50)
BILIRUB SERPL-MCNC: 0.3 MG/DL
BUN SERPL-MCNC: 16.7 MG/DL (ref 6–20)
CALCIUM SERPL-MCNC: 9 MG/DL (ref 8.6–10)
CHLORIDE SERPL-SCNC: 101 MMOL/L (ref 98–107)
CHOLEST SERPL-MCNC: 205 MG/DL
CREAT SERPL-MCNC: 0.92 MG/DL (ref 0.67–1.17)
DEPRECATED HCO3 PLAS-SCNC: 25 MMOL/L (ref 22–29)
GFR SERPL CREATININE-BSD FRML MDRD: >90 ML/MIN/1.73M2
GLUCOSE SERPL-MCNC: 191 MG/DL (ref 70–99)
HBA1C MFR BLD: 9.7 % (ref 0–5.6)
HDLC SERPL-MCNC: 31 MG/DL
LDLC SERPL CALC-MCNC: 125 MG/DL
NONHDLC SERPL-MCNC: 174 MG/DL
POTASSIUM SERPL-SCNC: 4 MMOL/L (ref 3.4–5.3)
PROT SERPL-MCNC: 6.8 G/DL (ref 6.4–8.3)
SODIUM SERPL-SCNC: 138 MMOL/L (ref 136–145)
TRIGL SERPL-MCNC: 246 MG/DL

## 2023-04-11 PROCEDURE — 83036 HEMOGLOBIN GLYCOSYLATED A1C: CPT | Performed by: FAMILY MEDICINE

## 2023-04-11 PROCEDURE — 80053 COMPREHEN METABOLIC PANEL: CPT | Performed by: FAMILY MEDICINE

## 2023-04-11 PROCEDURE — 36415 COLL VENOUS BLD VENIPUNCTURE: CPT | Performed by: FAMILY MEDICINE

## 2023-04-11 PROCEDURE — 99207 PR FOOT EXAM NO CHARGE: CPT | Performed by: FAMILY MEDICINE

## 2023-04-11 PROCEDURE — 99214 OFFICE O/P EST MOD 30 MIN: CPT | Performed by: FAMILY MEDICINE

## 2023-04-11 PROCEDURE — 80061 LIPID PANEL: CPT | Performed by: FAMILY MEDICINE

## 2023-04-11 RX ORDER — BLOOD SUGAR DIAGNOSTIC
1 STRIP MISCELLANEOUS DAILY
Qty: 100 STRIP | Refills: 1 | Status: SHIPPED | OUTPATIENT
Start: 2023-04-11 | End: 2024-02-22

## 2023-04-11 RX ORDER — LISINOPRIL 20 MG/1
20 TABLET ORAL DAILY
Qty: 90 TABLET | Refills: 1 | Status: SHIPPED | OUTPATIENT
Start: 2023-04-11 | End: 2024-02-19

## 2023-04-11 RX ORDER — ATORVASTATIN CALCIUM 20 MG/1
20 TABLET, FILM COATED ORAL DAILY
Qty: 90 TABLET | Refills: 1 | Status: SHIPPED | OUTPATIENT
Start: 2023-04-11 | End: 2023-11-30

## 2023-04-11 ASSESSMENT — PAIN SCALES - GENERAL: PAINLEVEL: NO PAIN (0)

## 2023-04-11 NOTE — PROGRESS NOTES
Assessment & Plan     Type 2 diabetes mellitus with diabetic neuropathy, with long-term current use of insulin (H)  Has been gradually improving, will have him to recheck lab and consider adding a new agent if indicated   Will refer him to eye clinic to assess diabetic retinopathy  Foot exam today is normal  - Adult Eye  Referral; Future  - Lipid panel reflex to direct LDL Non-fasting; Future  - FOOT EXAM  - Comprehensive metabolic panel (BMP + Alb, Alk Phos, ALT, AST, Total. Bili, TP); Future  - Hemoglobin A1c; Future  - lisinopril (ZESTRIL) 20 MG tablet; Take 1 tablet (20 mg) by mouth daily  - atorvastatin (LIPITOR) 20 MG tablet; Take 1 tablet (20 mg) by mouth daily  - ACCU-CHEK GUIDE test strip; 1 strip by In Vitro route daily Please    Screen for colon cancer  Will discuss at next visit for CPE    Morbid obesity (H)  Has no improvement, encouraged him to continue working on life style modification including low fat/carb diet with regular exercise     Benign essential hypertension  Has been elevated with DM, will have him to start ACEI for current level of HTN  - lisinopril (ZESTRIL) 20 MG tablet; Take 1 tablet (20 mg) by mouth daily  - atorvastatin (LIPITOR) 20 MG tablet; Take 1 tablet (20 mg) by mouth daily    Mixed hyperlipidemia  Will have him to start statin due to DM and HTN  - lisinopril (ZESTRIL) 20 MG tablet; Take 1 tablet (20 mg) by mouth daily  - atorvastatin (LIPITOR) 20 MG tablet; Take 1 tablet (20 mg) by mouth daily    New onset type 2 diabetes mellitus (H)  Mentioned above   - ACCU-CHEK GUIDE test strip; 1 strip by In Vitro route daily Please         FUTURE APPOINTMENTS:       - Follow-up visit in 6 months for CPE    Attila Rodriguez MD  [facility] ODALYS Angulo is a 47 year old, presenting for the following health issues:  Diabetes        4/11/2023    10:40 AM   Additional Questions   Roomed by Beryl     History of Present Illness       Diabetes:   He  presents for follow up of diabetes.  He is checking home blood glucose one time daily. He checks blood glucose before meals.  Blood glucose is sometimes over 200 and never under 70. When his blood glucose is low, the patient is asymptomatic for confusion, blurred vision, lethargy and reports not feeling dizzy, shaky, or weak.  He has no concerns regarding his diabetes at this time.  He is not experiencing numbness or burning in feet, excessive thirst, blurry vision, weight changes or redness, sores or blisters on feet. The patient has not had a diabetic eye exam in the last 12 months.         He eats 2-3 servings of fruits and vegetables daily.He consumes 3 sweetened beverage(s) daily.He exercises with enough effort to increase his heart rate 9 or less minutes per day.  He exercises with enough effort to increase his heart rate 3 or less days per week.   He is taking medications regularly.       Diabetes Follow-up    How often are you checking your blood sugar? One time daily  What time of day are you checking your blood sugars (select all that apply)?  Before meals  Have you had any blood sugars above 200?  Yes 4-5/month  Have you had any blood sugars below 70?  No    What symptoms do you notice when your blood sugar is low?  None    What concerns do you have today about your diabetes? None     Do you have any of these symptoms? (Select all that apply)  Numbness in feet    Have you had a diabetic eye exam in the last 12 months? No            Hyperlipidemia Follow-Up      Are you regularly taking any medication or supplement to lower your cholesterol?   No    Are you having muscle aches or other side effects that you think could be caused by your cholesterol lowering medication?  No    Hypertension Follow-up      Do you check your blood pressure regularly outside of the clinic? No     Are you following a low salt diet? No    Are your blood pressures ever more than 140 on the top number (systolic) OR more   than 90  "on the bottom number (diastolic), for example 140/90? No    BP Readings from Last 2 Encounters:   04/11/23 (!) 148/86   02/20/23 (!) 146/88     LDL Cholesterol Calculated   Date Value   12/06/2021      Comment:     Cannot estimate LDL when triglyceride exceeds 400 mg/dL   09/06/2019 137 mg/dL (H)   09/02/2003 145 mg/dL (H)     LDL Cholesterol Direct (mg/dL)   Date Value   12/06/2021 151 (H)   12/06/2012 115             Review of Systems   Constitutional, HEENT, cardiovascular, pulmonary, gi and gu systems are negative, except as otherwise noted.      Objective    BP (!) 148/86   Pulse 94   Temp 97.7  F (36.5  C) (Temporal)   Resp 22   Ht 1.854 m (6' 1\")   Wt (!) 160.1 kg (353 lb)   SpO2 98%   BMI 46.57 kg/m    Body mass index is 46.57 kg/m .  Physical Exam   GENERAL: healthy, alert and no distress  EYES: Eyes grossly normal to inspection, PERRL and conjunctivae and sclerae normal  HENT: ear canals and TM's normal, nose and mouth without ulcers or lesions  NECK: no adenopathy, no asymmetry, masses, or scars and thyroid normal to palpation  RESP: lungs clear to auscultation - no rales, rhonchi or wheezes  CV: regular rate and rhythm, normal S1 S2, no S3 or S4, no murmur, click or rub, no peripheral edema and peripheral pulses strong  ABDOMEN: soft, nontender, no hepatosplenomegaly, no masses and bowel sounds normal  MS: no gross musculoskeletal defects noted, no edema  BACK: no CVA tenderness, no paralumbar tenderness  LYMPH: no cervical, supraclavicular, axillary, or inguinal adenopathy  Diabetic foot exam: normal DP and PT pulses, no trophic changes or ulcerative lesions and normal sensory exam                    "

## 2023-04-15 ENCOUNTER — HEALTH MAINTENANCE LETTER (OUTPATIENT)
Age: 48
End: 2023-04-15

## 2023-06-11 DIAGNOSIS — E11.9 NEW ONSET TYPE 2 DIABETES MELLITUS (H): ICD-10-CM

## 2023-06-12 RX ORDER — PEN NEEDLE, DIABETIC, SAFETY 30 GX3/16"
NEEDLE, DISPOSABLE MISCELLANEOUS
Qty: 100 EACH | Refills: 0 | Status: SHIPPED | OUTPATIENT
Start: 2023-06-12 | End: 2023-07-28

## 2023-06-12 NOTE — TELEPHONE ENCOUNTER
Prescription approved per [facility] Refill Protocol.  Josefa Carmen, RN  [facility] Triage Nurse

## 2023-07-14 DIAGNOSIS — E11.9 NEW ONSET TYPE 2 DIABETES MELLITUS (H): ICD-10-CM

## 2023-07-14 RX ORDER — INSULIN GLARGINE 100 [IU]/ML
INJECTION, SOLUTION SUBCUTANEOUS
Qty: 75 ML | Refills: 0 | OUTPATIENT
Start: 2023-07-14

## 2023-07-16 RX ORDER — INSULIN GLARGINE 100 [IU]/ML
INJECTION, SOLUTION SUBCUTANEOUS
Qty: 75 ML | Refills: 0 | Status: SHIPPED | OUTPATIENT
Start: 2023-07-16 | End: 2023-11-13

## 2023-07-28 DIAGNOSIS — E11.9 NEW ONSET TYPE 2 DIABETES MELLITUS (H): ICD-10-CM

## 2023-07-28 RX ORDER — PEN NEEDLE, DIABETIC, SAFETY 30 GX3/16"
NEEDLE, DISPOSABLE MISCELLANEOUS
Qty: 100 EACH | Refills: 11 | Status: SHIPPED | OUTPATIENT
Start: 2023-07-28 | End: 2023-07-31

## 2023-07-28 NOTE — TELEPHONE ENCOUNTER
Prescription approved per [facility] Refill Protocol. Patient is injecting two times daily.       Lorrie Castaneda RN  [facility]

## 2023-07-31 RX ORDER — PEN NEEDLE, DIABETIC, SAFETY 30 GX3/16"
NEEDLE, DISPOSABLE MISCELLANEOUS
Qty: 200 EACH | Refills: 0 | Status: SHIPPED | OUTPATIENT
Start: 2023-07-31 | End: 2024-05-13

## 2023-08-15 DIAGNOSIS — F41.1 GENERALIZED ANXIETY DISORDER: ICD-10-CM

## 2023-08-15 DIAGNOSIS — R10.84 ABDOMINAL PAIN, GENERALIZED: ICD-10-CM

## 2023-08-15 RX ORDER — VENLAFAXINE HYDROCHLORIDE 75 MG/1
CAPSULE, EXTENDED RELEASE ORAL
Qty: 90 CAPSULE | Refills: 0 | Status: SHIPPED | OUTPATIENT
Start: 2023-08-15 | End: 2023-11-13

## 2023-08-15 RX ORDER — PANTOPRAZOLE SODIUM 40 MG/1
40 TABLET, DELAYED RELEASE ORAL DAILY
Qty: 90 TABLET | Refills: 1 | Status: SHIPPED | OUTPATIENT
Start: 2023-08-15 | End: 2024-02-09

## 2023-09-16 ENCOUNTER — HEALTH MAINTENANCE LETTER (OUTPATIENT)
Age: 48
End: 2023-09-16

## 2023-11-11 DIAGNOSIS — F41.1 GENERALIZED ANXIETY DISORDER: ICD-10-CM

## 2023-11-11 DIAGNOSIS — E11.9 NEW ONSET TYPE 2 DIABETES MELLITUS (H): ICD-10-CM

## 2023-11-13 RX ORDER — INSULIN GLARGINE 100 [IU]/ML
INJECTION, SOLUTION SUBCUTANEOUS
Qty: 30 ML | Refills: 0 | Status: SHIPPED | OUTPATIENT
Start: 2023-11-13 | End: 2023-11-16

## 2023-11-13 RX ORDER — INSULIN GLARGINE 100 [IU]/ML
INJECTION, SOLUTION SUBCUTANEOUS
Qty: 75 ML | Refills: 0 | OUTPATIENT
Start: 2023-11-13

## 2023-11-13 RX ORDER — VENLAFAXINE HYDROCHLORIDE 75 MG/1
CAPSULE, EXTENDED RELEASE ORAL
Qty: 90 CAPSULE | Refills: 0 | Status: SHIPPED | OUTPATIENT
Start: 2023-11-13 | End: 2024-02-09

## 2023-11-13 RX ORDER — VENLAFAXINE HYDROCHLORIDE 75 MG/1
CAPSULE, EXTENDED RELEASE ORAL
Qty: 90 CAPSULE | Refills: 0 | OUTPATIENT
Start: 2023-11-13

## 2023-11-13 NOTE — TELEPHONE ENCOUNTER
Patient notified of provider's response via [employer]. Postponing encounter to check later that patient has read the message.    Roselia MARTINEZ RN  [facility] Triage Team

## 2023-11-13 NOTE — TELEPHONE ENCOUNTER
Red-line priority call to RN.    Patient calling wanting to know why his prescription for insulin and venlafaxine was denied. Informed patient 2 requests were received for these medications, one was routed to provider and the other was declined.     Patient states he is needing refills of this today as he will be out.     Resending refill request high priority.     Patient would like a call back once addressed by provider.     Bell PORRAS, RN  [facility]

## 2023-11-16 RX ORDER — INSULIN GLARGINE 100 [IU]/ML
INJECTION, SOLUTION SUBCUTANEOUS
Qty: 60 ML | Refills: 0 | Status: SHIPPED | OUTPATIENT
Start: 2023-11-16 | End: 2023-12-20

## 2023-11-16 NOTE — TELEPHONE ENCOUNTER
Spoke to patient  and scheduled a phone visit 11/24/2023 .     Patient is taking   Lantus Solostar , in the am and before bedtime, 30 units. His blood sugar is running between 180-220.     His RX  needs to be updated in the chart . Patient  has been running out of insulin and has to call the pharmacy twice a month.       Lorrie Castaneda RN  -[facility]

## 2023-11-24 ENCOUNTER — VIRTUAL VISIT (OUTPATIENT)
Dept: FAMILY MEDICINE | Facility: CLINIC | Age: 48
End: 2023-11-24
Payer: COMMERCIAL

## 2023-11-24 DIAGNOSIS — I10 BENIGN ESSENTIAL HYPERTENSION: ICD-10-CM

## 2023-11-24 DIAGNOSIS — E78.2 MIXED HYPERLIPIDEMIA: ICD-10-CM

## 2023-11-24 DIAGNOSIS — E11.69 TYPE 2 DIABETES MELLITUS WITH OTHER SPECIFIED COMPLICATION, WITHOUT LONG-TERM CURRENT USE OF INSULIN (H): ICD-10-CM

## 2023-11-24 DIAGNOSIS — E11.40 TYPE 2 DIABETES MELLITUS WITH DIABETIC NEUROPATHY, WITH LONG-TERM CURRENT USE OF INSULIN (H): Primary | ICD-10-CM

## 2023-11-24 DIAGNOSIS — Z79.4 TYPE 2 DIABETES MELLITUS WITH DIABETIC NEUROPATHY, WITH LONG-TERM CURRENT USE OF INSULIN (H): Primary | ICD-10-CM

## 2023-11-24 PROCEDURE — 99443 PR PHYSICIAN TELEPHONE EVALUATION 21-30 MIN: CPT | Mod: 95 | Performed by: FAMILY MEDICINE

## 2023-11-24 RX ORDER — INSULIN GLARGINE 100 [IU]/ML
35 INJECTION, SOLUTION SUBCUTANEOUS 2 TIMES DAILY
Qty: 75 ML | Refills: 1 | Status: SHIPPED | OUTPATIENT
Start: 2023-11-24 | End: 2023-11-30

## 2023-11-24 RX ORDER — PROPRANOLOL HYDROCHLORIDE 40 MG/1
40 TABLET ORAL ONCE
COMMUNITY
End: 2024-04-24

## 2023-11-24 NOTE — PROGRESS NOTES
"Kev is a 48 year old who is being evaluated via a billable telephone visit.      What phone number would you like to be contacted at? [phone]  How would you like to obtain your AVS? Sarah Bethhart    Distant Location (provider location):  On-site    Assessment & Plan     Type 2 diabetes mellitus with other specified complication, without long-term current use of insulin (H)  Fluctuating, will review the lab results and update pt with adjusting regimen ,will add GLP-1 injectable if indicated    - HEMOGLOBIN A1C; Future  - Comprehensive metabolic panel (BMP + Alb, Alk Phos, ALT, AST, Total. Bili, TP); Future  - LDL cholesterol direct; Future  - insulin glargine (LANTUS SOLOSTAR) 100 UNIT/ML pen; Inject 35 Units Subcutaneous 2 times daily    Benign essential hypertension  Stable  Keep monitoring   - HEMOGLOBIN A1C; Future  - Comprehensive metabolic panel (BMP + Alb, Alk Phos, ALT, AST, Total. Bili, TP); Future  - LDL cholesterol direct; Future  - insulin glargine (LANTUS SOLOSTAR) 100 UNIT/ML pen; Inject 35 Units Subcutaneous 2 times daily    Mixed hyperlipidemia  Stable   - HEMOGLOBIN A1C; Future  - Comprehensive metabolic panel (BMP + Alb, Alk Phos, ALT, AST, Total. Bili, TP); Future  - LDL cholesterol direct; Future  - insulin glargine (LANTUS SOLOSTAR) 100 UNIT/ML pen; Inject 35 Units Subcutaneous 2 times daily             BMI:   Estimated body mass index is 46.57 kg/m  as calculated from the following:    Height as of 4/11/23: 1.854 m (6' 1\").    Weight as of 4/11/23: 160.1 kg (353 lb).   Weight management plan: Discussed healthy diet and exercise guidelines    FUTURE APPOINTMENTS:       - Follow-up visit in 3-4 months if adding GLP-1, or 6 months if stable and not needed GLP-1 addition     Attila Rodriguez MD  [facility]    [place]   Kev is a 48 year old, presenting for the following health issues:  Recheck Medication (Insulin rx needs to be written differently )        11/24/2023     " 7:19 AM   Additional Questions   Roomed by Anaya WEEKS       History of Present Illness       Reason for visit:  Med refill/check        Medication Followup of various meds  Taking Medication as prescribed: yes  Side Effects:  None  Medication Helping Symptoms:  not applicable        Review of Systems   Constitutional, HEENT, cardiovascular, pulmonary, gi and gu systems are negative, except as otherwise noted.      Objective           Vitals:  No vitals were obtained today due to virtual visit.    Physical Exam   healthy, alert, and no distress  PSYCH: Alert and oriented times 3; coherent speech, normal   rate and volume, able to articulate logical thoughts, able   to abstract reason, no tangential thoughts, no hallucinations   or delusions  His affect is normal  RESP: No cough, no audible wheezing, able to talk in full sentences  Remainder of exam unable to be completed due to telephone visits                Phone call duration: 26 minutes

## 2023-11-28 ENCOUNTER — LAB (OUTPATIENT)
Dept: LAB | Facility: CLINIC | Age: 48
End: 2023-11-28
Payer: COMMERCIAL

## 2023-11-28 DIAGNOSIS — E78.2 MIXED HYPERLIPIDEMIA: ICD-10-CM

## 2023-11-28 DIAGNOSIS — Z11.59 NEED FOR HEPATITIS C SCREENING TEST: ICD-10-CM

## 2023-11-28 DIAGNOSIS — I10 BENIGN ESSENTIAL HYPERTENSION: ICD-10-CM

## 2023-11-28 DIAGNOSIS — E11.69 TYPE 2 DIABETES MELLITUS WITH OTHER SPECIFIED COMPLICATION, WITHOUT LONG-TERM CURRENT USE OF INSULIN (H): ICD-10-CM

## 2023-11-28 DIAGNOSIS — Z11.4 SCREENING FOR HIV (HUMAN IMMUNODEFICIENCY VIRUS): Primary | ICD-10-CM

## 2023-11-28 LAB — HBA1C MFR BLD: 9.8 % (ref 0–5.6)

## 2023-11-28 PROCEDURE — 36415 COLL VENOUS BLD VENIPUNCTURE: CPT

## 2023-11-28 PROCEDURE — 80053 COMPREHEN METABOLIC PANEL: CPT

## 2023-11-28 PROCEDURE — 83036 HEMOGLOBIN GLYCOSYLATED A1C: CPT

## 2023-11-28 PROCEDURE — 83721 ASSAY OF BLOOD LIPOPROTEIN: CPT

## 2023-11-28 PROCEDURE — 86803 HEPATITIS C AB TEST: CPT

## 2023-11-28 PROCEDURE — 87389 HIV-1 AG W/HIV-1&-2 AB AG IA: CPT

## 2023-11-29 LAB
ALBUMIN SERPL BCG-MCNC: 4.2 G/DL (ref 3.5–5.2)
ALP SERPL-CCNC: 95 U/L (ref 40–150)
ALT SERPL W P-5'-P-CCNC: 31 U/L (ref 0–70)
ANION GAP SERPL CALCULATED.3IONS-SCNC: 10 MMOL/L (ref 7–15)
AST SERPL W P-5'-P-CCNC: 23 U/L (ref 0–45)
BILIRUB SERPL-MCNC: 0.3 MG/DL
BUN SERPL-MCNC: 15.7 MG/DL (ref 6–20)
CALCIUM SERPL-MCNC: 8.9 MG/DL (ref 8.6–10)
CHLORIDE SERPL-SCNC: 102 MMOL/L (ref 98–107)
CREAT SERPL-MCNC: 0.85 MG/DL (ref 0.67–1.17)
DEPRECATED HCO3 PLAS-SCNC: 25 MMOL/L (ref 22–29)
EGFRCR SERPLBLD CKD-EPI 2021: >90 ML/MIN/1.73M2
GLUCOSE SERPL-MCNC: 211 MG/DL (ref 70–99)
HCV AB SERPL QL IA: NONREACTIVE
HIV 1+2 AB+HIV1 P24 AG SERPL QL IA: NONREACTIVE
LDLC SERPL DIRECT ASSAY-MCNC: 121 MG/DL
POTASSIUM SERPL-SCNC: 4.2 MMOL/L (ref 3.4–5.3)
PROT SERPL-MCNC: 6.8 G/DL (ref 6.4–8.3)
SODIUM SERPL-SCNC: 137 MMOL/L (ref 135–145)

## 2023-11-30 RX ORDER — ATORVASTATIN CALCIUM 20 MG/1
20 TABLET, FILM COATED ORAL DAILY
Qty: 90 TABLET | Refills: 1 | Status: SHIPPED | OUTPATIENT
Start: 2023-11-30 | End: 2024-02-19

## 2023-11-30 RX ORDER — INSULIN GLARGINE 100 [IU]/ML
38 INJECTION, SOLUTION SUBCUTANEOUS 2 TIMES DAILY
Start: 2023-11-30 | End: 2024-02-19

## 2023-12-19 DIAGNOSIS — E11.9 NEW ONSET TYPE 2 DIABETES MELLITUS (H): ICD-10-CM

## 2023-12-20 ENCOUNTER — TELEPHONE (OUTPATIENT)
Dept: FAMILY MEDICINE | Facility: CLINIC | Age: 48
End: 2023-12-20

## 2023-12-20 ENCOUNTER — TELEPHONE (OUTPATIENT)
Dept: FAMILY MEDICINE | Facility: CLINIC | Age: 48
End: 2023-12-20
Payer: COMMERCIAL

## 2023-12-20 DIAGNOSIS — E11.9 NEW ONSET TYPE 2 DIABETES MELLITUS (H): ICD-10-CM

## 2023-12-20 RX ORDER — INSULIN GLARGINE 100 [IU]/ML
INJECTION, SOLUTION SUBCUTANEOUS
Qty: 60 ML | Refills: 0 | Status: SHIPPED | OUTPATIENT
Start: 2023-12-20 | End: 2023-12-20

## 2023-12-20 RX ORDER — INSULIN GLARGINE 100 [IU]/ML
INJECTION, SOLUTION SUBCUTANEOUS
Qty: 75 ML | Refills: 1 | Status: SHIPPED | OUTPATIENT
Start: 2023-12-20 | End: 2024-02-19

## 2023-12-20 NOTE — TELEPHONE ENCOUNTER
Spoke to patient and he is upset that incorrect dose was approved.     I re assured the patient the correct dose was sent by Dr. Rodriguez to the pharmacy.     Spoke to pharmacy ad correct dose is now in their system.     Lorrie Castaneda RN  [facility]

## 2023-12-20 NOTE — TELEPHONE ENCOUNTER
The new one(30u) was signed by triage lately. I will resend it under my name with 38 units. It was switched from 35 to 38 as pasted results note below    Let pt know         Attila Rodriguez MD  11/30/2023 11:45 AM CST Back to Top      Kev,     You maybe able to check the lab results via [employer], it showed your lab results including Hepatitis C and HIV screening tests/electrolyte balance/liver and kidney function were all normal.  Your A1C for diabetes hasn't been improving. Please increase the dose of Lantus up to 38 units twice daily. And, I will also add one more medicine called Jardiance. Please start taking it additionally, and plan to recheck  with us in 3 months.  Your LDL is elevated, please resume Atorvastatin to control your cholesterol better. I sent a refill prescription too.        Thanks,

## 2023-12-20 NOTE — TELEPHONE ENCOUNTER
Takes insulin, supposed to be taking 35 units twice a day.  Dr Rodriguez sent prescription today for 30 units twice daily.  Needing this addressed and corrected ASAP.    Denies any diabetes symptoms at present.    Pt frustrated at how many times this has been incorrectly sent when it is so important.    Please call pt this afternoon.  Reports [facility] cannot get into [facility].    Routed to Dr Rodriguez and [facility] Triage Nurses.  If Dr Rodriguez is out of office, please bring to POD.  Advised pt to call back around 3 pm if he has not heard back from clinic.    Heidi Carlos RN, BSN  [facility]

## 2023-12-28 ENCOUNTER — OFFICE VISIT (OUTPATIENT)
Dept: FAMILY MEDICINE | Facility: CLINIC | Age: 48
End: 2023-12-28
Payer: COMMERCIAL

## 2023-12-28 VITALS
TEMPERATURE: 97.4 F | OXYGEN SATURATION: 98 % | BODY MASS INDEX: 47.36 KG/M2 | HEART RATE: 100 BPM | SYSTOLIC BLOOD PRESSURE: 143 MMHG | DIASTOLIC BLOOD PRESSURE: 91 MMHG | WEIGHT: 315 LBS

## 2023-12-28 DIAGNOSIS — I10 BENIGN ESSENTIAL HYPERTENSION: ICD-10-CM

## 2023-12-28 DIAGNOSIS — Z12.11 SCREEN FOR COLON CANCER: ICD-10-CM

## 2023-12-28 DIAGNOSIS — E11.69 TYPE 2 DIABETES MELLITUS WITH OTHER SPECIFIED COMPLICATION, WITHOUT LONG-TERM CURRENT USE OF INSULIN (H): ICD-10-CM

## 2023-12-28 DIAGNOSIS — L02.415 ABSCESS OF RIGHT THIGH: ICD-10-CM

## 2023-12-28 DIAGNOSIS — R31.0 GROSS HEMATURIA: ICD-10-CM

## 2023-12-28 DIAGNOSIS — N28.89 RIGHT RENAL MASS: Primary | ICD-10-CM

## 2023-12-28 PROBLEM — F17.200 SMOKER: Status: ACTIVE | Noted: 2023-02-11

## 2023-12-28 PROCEDURE — 99215 OFFICE O/P EST HI 40 MIN: CPT | Performed by: PHYSICIAN ASSISTANT

## 2023-12-28 RX ORDER — DOXYCYCLINE HYCLATE 100 MG
1 TABLET ORAL
COMMUNITY
Start: 2023-12-24 | End: 2024-02-19

## 2023-12-28 ASSESSMENT — PAIN SCALES - GENERAL: PAINLEVEL: NO PAIN (0)

## 2023-12-28 NOTE — PATIENT INSTRUCTIONS
Next week if fasting blood sugars are still too high please increase your insulin as follows:  - Thurs/Fri/Sat - increase to 36 unit(s) twice daily  - Sun/Mon/Tues - If fasting blood sugar still higher than 130, increase further to 37 unit(s) twice daily     Continue to increase insulin by 2 unit(s) per day (1 unit(s) twice daily) every 3 days until fasting blood sugar is under 130. Do not take more than 40 unit(s) twice daily     Goal blood sugar readings:  Fasting in AM:   2 hours after main meal < 160  Bedtime 100-140     M [address] [address]  Phone: [phone]  Fax: [phone]

## 2023-12-28 NOTE — PROGRESS NOTES
Assessment & Plan     Right renal mass  Gross hematuria  Has appointment with outside urology group 1/12/24.     Abscess of right thigh  Much improved, closed, no active drainage. No erythema. Afebrile. Finish abx. Reviewed red flag signs and symptoms that would warrant urgent care vs ER follow up, patient indicates understanding of this.     Type 2 diabetes mellitus with other specified complication, without long-term current use of insulin (H)  Blood sugars have been high. Discussed the current/recent infection could be contributing. Starting next week if fasting glucose remains high will plan to have patient increase insulin by 2 units daily (1 unit(s) twice daily) every 3 days up to 40 unit(s) BID. If persistently elevated beyond this insulin dose would suggest adding additional agent. Patient is not taking jardiance at this time.     Benign essential hypertension   Not taking lisinopril and declines to start. Close monitoring of BP at future visits.     Review of prior external note(s) from - [facility] information from [facility] reviewed       MED REC REQUIRED  Post Medication Reconciliation Status:  Discharge medications reconciled, continue medications without change    Follow up in 2 months for DM recheck    41 minutes spent on the date of the encounter doing chart review, history and exam, documentation and further activities as noted above     Carole Hernández PA-C on 12/28/2023 at 9:34 AM    [facility] ODALYS [place]   Kev is a 48 year old, presenting for the following health issues:  Hospital F/U        12/28/2023     9:27 AM   Additional Questions   Roomed by Daniel       [facility]       ED/UC Followup:    Facility:  [facility]  Date of visit: 12/23/23  Reason for visit: Blood in urine, Cellulitis of right thigh  Current Status: improving       Seen at [facility] 12/23 for abscess and cellulitis right thigh and hematuria.     UA without sign of  infection. CT abdomen/pelvis done for evaluation of hematuria noted right renal mass:  1. Right renal lobulated soft tissue lesion measuring up to 5.1 cm with abutment of the right psoas musculature. Findings are concerning for soft tissue renal mass for which differential considerations include renal cell cancer. Recommend nonemergent urologic consultation and workup.   2. No nephrolithiasis or hydroureteronephrosis.  Has appointment with urology through another group 1/12/24    I&D of abscess completed in ER, packed   Given dose of IV cefepime while in ER  Treated with augmentin and doxy x10 days   Area improving, does not think still draining  He removed the packing after a few days.  History of necrotizing fasciitis and diabetes requiring surgical intervention 2/2023    Checking glucose fasting in AM which have been high over the last month - ranging 200s  Not on jardiance, wasn't aware he should be as it was never discussed  Doesn't check [employer]  Not on statin or lisinopril because it was also not discussed     Review of Systems   Constitutional, HEENT, cardiovascular, pulmonary, gi and gu systems are negative, except as otherwise noted.      Objective    BP (!) 143/91   Pulse 100   Temp 97.4  F (36.3  C) (Tympanic)   Wt (!) 162.8 kg (359 lb)   SpO2 98%   BMI 47.36 kg/m    Body mass index is 47.36 kg/m .  Physical Exam   GENERAL: healthy, alert and no distress  SKIN: healed I&D site right medial thigh without surrounding erythema or swelling, no drainage, site is closed.   NEURO: Normal strength and tone, mentation intact and speech normal  PSYCH: mentation appears normal, affect normal/bright

## 2024-01-25 ENCOUNTER — TRANSFERRED RECORDS (OUTPATIENT)
Dept: HEALTH INFORMATION MANAGEMENT | Facility: CLINIC | Age: 49
End: 2024-01-25
Payer: MEDICAID

## 2024-02-09 DIAGNOSIS — R10.84 ABDOMINAL PAIN, GENERALIZED: ICD-10-CM

## 2024-02-09 DIAGNOSIS — F41.1 GENERALIZED ANXIETY DISORDER: ICD-10-CM

## 2024-02-09 RX ORDER — PANTOPRAZOLE SODIUM 40 MG/1
40 TABLET, DELAYED RELEASE ORAL DAILY
Qty: 90 TABLET | Refills: 1 | Status: SHIPPED | OUTPATIENT
Start: 2024-02-09 | End: 2024-05-13

## 2024-02-09 RX ORDER — VENLAFAXINE HYDROCHLORIDE 75 MG/1
CAPSULE, EXTENDED RELEASE ORAL
Qty: 90 CAPSULE | Refills: 1 | Status: SHIPPED | OUTPATIENT
Start: 2024-02-09 | End: 2024-05-13

## 2024-02-09 NOTE — TELEPHONE ENCOUNTER
Medication Question or Refill    Contacts         Type Contact Phone/Fax    02/09/2024 12:08 PM CST Phone (Incoming) Kev Gilbert (Self) [phone] (H)            What medication are you calling about (include dose and sig)?: pantprazole 40 mg , effexor 75 mg     Preferred Pharmacy:    [id number] - [place], [place] - [address] AT [address] [address] [place] [address]  Phone: [phone] Fax: [phone]      Controlled Substance Agreement on file:   CSA -- Patient Level:    CSA: None found at the patient level.       Who prescribed the medication?: pcp    Do you need a refill? Yes    When did you use the medication last? N/a    Patient offered an appointment? No    Do you have any questions or concerns?  No      Could we send this information to you in [facility] or would you prefer to receive a phone call?:   Patient would prefer a phone call   Okay to leave a detailed message?: Yes at Cell number on file:    Telephone Information:   Mobile [phone]

## 2024-02-12 RX ORDER — PANTOPRAZOLE SODIUM 40 MG/1
40 TABLET, DELAYED RELEASE ORAL DAILY
Qty: 90 TABLET | Refills: 1 | OUTPATIENT
Start: 2024-02-12

## 2024-02-12 RX ORDER — VENLAFAXINE HYDROCHLORIDE 75 MG/1
CAPSULE, EXTENDED RELEASE ORAL
Qty: 90 CAPSULE | Refills: 1 | OUTPATIENT
Start: 2024-02-12

## 2024-02-19 ENCOUNTER — OFFICE VISIT (OUTPATIENT)
Dept: FAMILY MEDICINE | Facility: CLINIC | Age: 49
End: 2024-02-19
Payer: MEDICAID

## 2024-02-19 VITALS
WEIGHT: 315 LBS | HEART RATE: 91 BPM | DIASTOLIC BLOOD PRESSURE: 85 MMHG | RESPIRATION RATE: 18 BRPM | BODY MASS INDEX: 41.75 KG/M2 | TEMPERATURE: 97.6 F | OXYGEN SATURATION: 99 % | HEIGHT: 73 IN | SYSTOLIC BLOOD PRESSURE: 130 MMHG

## 2024-02-19 DIAGNOSIS — E66.01 MORBID OBESITY (H): ICD-10-CM

## 2024-02-19 DIAGNOSIS — L02.214 ABSCESS OF LEFT GROIN: ICD-10-CM

## 2024-02-19 DIAGNOSIS — Z01.818 PREOP GENERAL PHYSICAL EXAM: Primary | ICD-10-CM

## 2024-02-19 DIAGNOSIS — N28.89 RIGHT RENAL MASS: ICD-10-CM

## 2024-02-19 DIAGNOSIS — R29.818 SUSPECTED SLEEP APNEA: ICD-10-CM

## 2024-02-19 DIAGNOSIS — E11.69 TYPE 2 DIABETES MELLITUS WITH OTHER SPECIFIED COMPLICATION, WITH LONG-TERM CURRENT USE OF INSULIN (H): ICD-10-CM

## 2024-02-19 DIAGNOSIS — Z79.4 TYPE 2 DIABETES MELLITUS WITH OTHER SPECIFIED COMPLICATION, WITH LONG-TERM CURRENT USE OF INSULIN (H): ICD-10-CM

## 2024-02-19 DIAGNOSIS — R82.90 ABNORMAL URINE ODOR: ICD-10-CM

## 2024-02-19 DIAGNOSIS — F17.210 NICOTINE DEPENDENCE, CIGARETTES, UNCOMPLICATED: ICD-10-CM

## 2024-02-19 LAB
ALBUMIN SERPL BCG-MCNC: 4.3 G/DL (ref 3.5–5.2)
ALBUMIN UR-MCNC: NEGATIVE MG/DL
ALP SERPL-CCNC: 98 U/L (ref 40–150)
ALT SERPL W P-5'-P-CCNC: 34 U/L (ref 0–70)
ANION GAP SERPL CALCULATED.3IONS-SCNC: 10 MMOL/L (ref 7–15)
APPEARANCE UR: CLEAR
AST SERPL W P-5'-P-CCNC: 22 U/L (ref 0–45)
BACTERIA #/AREA URNS HPF: NORMAL /HPF
BILIRUB SERPL-MCNC: 0.2 MG/DL
BILIRUB UR QL STRIP: NEGATIVE
BUN SERPL-MCNC: 12.5 MG/DL (ref 6–20)
CALCIUM SERPL-MCNC: 9.1 MG/DL (ref 8.6–10)
CHLORIDE SERPL-SCNC: 102 MMOL/L (ref 98–107)
COLOR UR AUTO: YELLOW
CREAT SERPL-MCNC: 0.9 MG/DL (ref 0.67–1.17)
CREAT UR-MCNC: 121 MG/DL
DEPRECATED HCO3 PLAS-SCNC: 25 MMOL/L (ref 22–29)
EGFRCR SERPLBLD CKD-EPI 2021: >90 ML/MIN/1.73M2
ERYTHROCYTE [DISTWIDTH] IN BLOOD BY AUTOMATED COUNT: 13 % (ref 10–15)
GLUCOSE SERPL-MCNC: 215 MG/DL (ref 70–99)
GLUCOSE UR STRIP-MCNC: 250 MG/DL
HBA1C MFR BLD: 10.1 % (ref 0–5.6)
HCT VFR BLD AUTO: 48.7 % (ref 40–53)
HGB BLD-MCNC: 16 G/DL (ref 13.3–17.7)
HGB UR QL STRIP: NEGATIVE
KETONES UR STRIP-MCNC: NEGATIVE MG/DL
LEUKOCYTE ESTERASE UR QL STRIP: NEGATIVE
MCH RBC QN AUTO: 30.1 PG (ref 26.5–33)
MCHC RBC AUTO-ENTMCNC: 32.9 G/DL (ref 31.5–36.5)
MCV RBC AUTO: 92 FL (ref 78–100)
MICROALBUMIN UR-MCNC: <12 MG/L
MICROALBUMIN/CREAT UR: NORMAL MG/G{CREAT}
NITRATE UR QL: NEGATIVE
PH UR STRIP: 6 [PH] (ref 5–7)
PLATELET # BLD AUTO: 185 10E3/UL (ref 150–450)
POTASSIUM SERPL-SCNC: 4.2 MMOL/L (ref 3.4–5.3)
PROT SERPL-MCNC: 6.7 G/DL (ref 6.4–8.3)
RBC # BLD AUTO: 5.31 10E6/UL (ref 4.4–5.9)
RBC #/AREA URNS AUTO: NORMAL /HPF
SODIUM SERPL-SCNC: 137 MMOL/L (ref 135–145)
SP GR UR STRIP: >=1.03 (ref 1–1.03)
SQUAMOUS #/AREA URNS AUTO: NORMAL /LPF
UROBILINOGEN UR STRIP-ACNC: 0.2 E.U./DL
WBC # BLD AUTO: 8.5 10E3/UL (ref 4–11)
WBC #/AREA URNS AUTO: NORMAL /HPF

## 2024-02-19 PROCEDURE — 80053 COMPREHEN METABOLIC PANEL: CPT | Performed by: PHYSICIAN ASSISTANT

## 2024-02-19 PROCEDURE — 82043 UR ALBUMIN QUANTITATIVE: CPT | Performed by: PHYSICIAN ASSISTANT

## 2024-02-19 PROCEDURE — 99214 OFFICE O/P EST MOD 30 MIN: CPT | Performed by: PHYSICIAN ASSISTANT

## 2024-02-19 PROCEDURE — 83036 HEMOGLOBIN GLYCOSYLATED A1C: CPT | Performed by: PHYSICIAN ASSISTANT

## 2024-02-19 PROCEDURE — 36415 COLL VENOUS BLD VENIPUNCTURE: CPT | Performed by: PHYSICIAN ASSISTANT

## 2024-02-19 PROCEDURE — 81001 URINALYSIS AUTO W/SCOPE: CPT | Performed by: PHYSICIAN ASSISTANT

## 2024-02-19 PROCEDURE — 85027 COMPLETE CBC AUTOMATED: CPT | Performed by: PHYSICIAN ASSISTANT

## 2024-02-19 PROCEDURE — 82570 ASSAY OF URINE CREATININE: CPT | Performed by: PHYSICIAN ASSISTANT

## 2024-02-19 RX ORDER — INSULIN GLARGINE 100 [IU]/ML
INJECTION, SOLUTION SUBCUTANEOUS
Qty: 75 ML | Refills: 1 | Status: SHIPPED | OUTPATIENT
Start: 2024-02-19 | End: 2024-02-22

## 2024-02-19 RX ORDER — NICOTINE 21 MG/24HR
1 PATCH, TRANSDERMAL 24 HOURS TRANSDERMAL EVERY 24 HOURS
Qty: 10 PATCH | Refills: 0 | Status: SHIPPED | OUTPATIENT
Start: 2024-02-19 | End: 2024-03-06

## 2024-02-19 ASSESSMENT — PAIN SCALES - GENERAL: PAINLEVEL: NO PAIN (0)

## 2024-02-19 NOTE — PATIENT INSTRUCTIONS
Patient is to take all scheduled medications on the day of surgery EXCEPT for modifications listed below:   - Long acting insulin (e.g. glargine, detemir): Take 80% of the usual evening or morning dose before surgery.   - nicotine patches: Continue on day of surgery. Patient to Inform anesthesia of patch location.    - venlafaxine: Continue without modification.    - Herbal medications and vitamins: HOLD 7 days prior to surgery.    Preparing for Your Surgery  Getting started  A nurse will call you to review your health history and instructions. They will give you an arrival time based on your scheduled surgery time. Please be ready to share:  Your doctor's clinic name and phone number  Your medical, surgical, and anesthesia history  A list of allergies and sensitivities  A list of medicines, including herbal treatments and over-the-counter drugs  Whether the patient has a legal guardian (ask how to send us the papers in advance)  Please tell us if you're pregnant--or if there's any chance you might be pregnant. Some surgeries may injure a fetus (unborn baby), so they require a pregnancy test. Surgeries that are safe for a fetus don't always need a test, and you can choose whether to have one.   If you have a child who's having surgery, please ask for a copy of Preparing for Your Child's Surgery.    Preparing for surgery  Within 10 to 30 days of surgery: Have a pre-op exam (sometimes called an H&P, or History and Physical). This can be done at a clinic or pre-operative center.  If you're having a , you may not need this exam. Talk to your care team.  At your pre-op exam, talk to your care team about all medicines you take. If you need to stop any medicines before surgery, ask when to start taking them again.  We do this for your safety. Many medicines can make you bleed too much during surgery. Some change how well surgery (anesthesia) drugs work.  Call your insurance company to let them know you're having  surgery. (If you don't have insurance, call [phone].)  Call your clinic if there's any change in your health. This includes signs of a cold or flu (sore throat, runny nose, cough, rash, fever). It also includes a scrape or scratch near the surgery site.  If you have questions on the day of surgery, call your hospital or surgery center.  Eating and drinking guidelines  For your safety: Unless your surgeon tells you otherwise, follow the guidelines below.  Eat and drink as usual until 8 hours before you arrive for surgery. After that, no food or milk.  Drink clear liquids until 2 hours before you arrive. These are liquids you can see through, like water, Gatorade, and Propel Water. They also include plain black coffee and tea (no cream or milk), candy, and breath mints. You can spit out gum when you arrive.  If you drink alcohol: Stop drinking it the night before surgery.  If your care team tells you to take medicine on the morning of surgery, it's okay to take it with a sip of water.  Preventing infection  Shower or bathe the night before and morning of your surgery. Follow the instructions your clinic gave you. (If no instructions, use regular soap.)  Don't shave or clip hair near your surgery site. We'll remove the hair if needed.  Don't smoke or vape the morning of surgery. You may chew nicotine gum up to 2 hours before surgery. A nicotine patch is okay.  Note: Some surgeries require you to completely quit smoking and nicotine. Check with your surgeon.  Your care team will make every effort to keep you safe from infection. We will:  Clean our hands often with soap and water (or an alcohol-based hand rub).  Clean the skin at your surgery site with a special soap that kills germs.  Give you a special gown to keep you warm. (Cold raises the risk of infection.)  Wear special hair covers, masks, gowns and gloves during surgery.  Give antibiotic medicine, if prescribed. Not all surgeries need antibiotics.  What to  bring on the day of surgery  Photo ID and insurance card  Copy of your health care directive, if you have one  Glasses and hearing aids (bring cases)  You can't wear contacts during surgery  Inhaler and eye drops, if you use them (tell us about these when you arrive)  CPAP machine or breathing device, if you use them  A few personal items, if spending the night  If you have . . .  A pacemaker, ICD (cardiac defibrillator) or other implant: Bring the ID card.  An implanted stimulator: Bring the remote control.  A legal guardian: Bring a copy of the certified (court-stamped) guardianship papers.  Please remove any jewelry, including body piercings. Leave jewelry and other valuables at home.  If you're going home the day of surgery  You must have a responsible adult drive you home. They should stay with you overnight as well.  If you don't have someone to stay with you, and you aren't safe to go home alone, we may keep you overnight. Insurance often won't pay for this.  After surgery  If it's hard to control your pain or you need more pain medicine, please call your surgeon's office.  Questions?   If you have any questions for your care team, list them here: _________________________________________________________________________________________________________________________________________________________________________ ____________________________________ ____________________________________ ____________________________________  For informational purposes only. Not to replace the advice of your health care provider. Copyright   2003, 2019 [facility]. All rights reserved. Clinically reviewed by Courtney Lacey MD. [email or web address] [id number] - REV 12/22.      How to Manage Your Diabetes Before Surgery  If you use insulin for your diabetes, follow these steps to keep your blood sugar in a safe range before surgery, when you ve been told not to eat or drink:   Check your blood sugar every 4 hours   If your blood  sugar is high, take a corrective dose (not a meal dose) of sliding-scale insulin, if that is what you re used to doing  If your blood sugar is below 100, or you have symptoms of hypoglycemia, follow these steps:   Have 1 item from this list:  4 oz (1/2 cup) of fruit juice without pulp    4 oz (1/2 cup) of regular soda (not diet soda)    3 glucose gels    5 sugar cubes or sugar packets   Check your blood sugar again after 15 minutes  Repeat steps 1 and 2 again until your blood sugar is greater than 100   Getting Your Diabetes in Better Control Before Surgery  Your blood sugars are too high to safely have surgery. High blood sugars before and after surgery can increase your risk of getting an infection after surgery and can increase the time it takes you to recover from surgery. We need to work together to get your blood sugars better managed before you have surgery.    I have referred you to the Diabetes Educator . You will meet with them within the next week. They will call you to schedule the appointment.    Before your visit, it's important that you check your blood sugars before each meal. Bring your meter and your log book to the appointment.    We often need to change your insulin to get your blood sugars in better control.    If we can't use insulin for some reason, the Diabetes Educator will help us find the best medicine to get your blood sugars in good control.     Once your blood sugar readings have been below 200 for a full week, we can work with your surgeon to get surgery scheduled.
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
## 2024-02-21 ENCOUNTER — TELEPHONE (OUTPATIENT)
Dept: FAMILY MEDICINE | Facility: CLINIC | Age: 49
End: 2024-02-21
Payer: MEDICAID

## 2024-02-21 NOTE — RESULT ENCOUNTER NOTE
Please call patient:    - A1c has increased further to 10.1 indicating diabetes is poorly controlled  - blood counts are normal - normal hemoglobin/red blood cells (no anemia), normal white blood cells (infection fighting cells), normal platelets (affect ability to clot normally)    - urine test shows no infection but does show sugar in urine, this fits with poorly controlled diabetes   - electrolytes, kidney and liver function normal    Keep upcoming appointment with diabetes education tomorrow 2/22 for further adjustments in DM meds, he should bring glucose readings to this appointment to review and discuss and see me as scheduled 3/6    Carole Hernández PA-C on 2/21/2024 at 8:02 AM

## 2024-02-21 NOTE — TELEPHONE ENCOUNTER
Spoke to patient, he was given his test results. He will complete his phone visit with diabetic educator tomorrow.       Lorrie Castaneda RN  [facility]

## 2024-02-21 NOTE — TELEPHONE ENCOUNTER
----- Message from Carole Hernández PA-C sent at 2/21/2024  8:03 AM CST -----  Please call patient:    - A1c has increased further to 10.1 indicating diabetes is poorly controlled  - blood counts are normal - normal hemoglobin/red blood cells (no anemia), normal white blood cells (infection fighting cells), normal platelets (affect ability to clot normally)    - urine test shows no infection but does show sugar in urine, this fits with poorly controlled diabetes   - electrolytes, kidney and liver function normal    Keep upcoming appointment with diabetes education tomorrow 2/22 for further adjustments in DM meds, he should bring glucose readings to this appointment to review and discuss and see me as scheduled 3/6    Carole Hernández PA-C on 2/21/2024 at 8:02 AM

## 2024-02-22 ENCOUNTER — VIRTUAL VISIT (OUTPATIENT)
Dept: EDUCATION SERVICES | Facility: CLINIC | Age: 49
End: 2024-02-22
Payer: MEDICAID

## 2024-02-22 DIAGNOSIS — Z79.4 TYPE 2 DIABETES MELLITUS WITH OTHER SPECIFIED COMPLICATION, WITH LONG-TERM CURRENT USE OF INSULIN (H): ICD-10-CM

## 2024-02-22 DIAGNOSIS — Z79.4 TYPE 2 DIABETES MELLITUS WITH DIABETIC NEUROPATHY, WITH LONG-TERM CURRENT USE OF INSULIN (H): ICD-10-CM

## 2024-02-22 DIAGNOSIS — E11.9 NEW ONSET TYPE 2 DIABETES MELLITUS (H): ICD-10-CM

## 2024-02-22 DIAGNOSIS — E11.40 TYPE 2 DIABETES MELLITUS WITH DIABETIC NEUROPATHY, WITH LONG-TERM CURRENT USE OF INSULIN (H): ICD-10-CM

## 2024-02-22 DIAGNOSIS — E11.69 TYPE 2 DIABETES MELLITUS WITH OTHER SPECIFIED COMPLICATION, WITH LONG-TERM CURRENT USE OF INSULIN (H): ICD-10-CM

## 2024-02-22 PROCEDURE — G0108 DIAB MANAGE TRN  PER INDIV: HCPCS | Mod: 93 | Performed by: DIETITIAN, REGISTERED

## 2024-02-22 RX ORDER — BLOOD-GLUCOSE SENSOR
1 EACH MISCELLANEOUS
Qty: 2 EACH | Refills: 3 | Status: SHIPPED | OUTPATIENT
Start: 2024-02-22

## 2024-02-22 RX ORDER — LANCETS
EACH MISCELLANEOUS
Qty: 100 EACH | Refills: 3 | Status: SHIPPED | OUTPATIENT
Start: 2024-02-22

## 2024-02-22 RX ORDER — METFORMIN HCL 500 MG
TABLET, EXTENDED RELEASE 24 HR ORAL
Qty: 162 TABLET | Refills: 1 | Status: SHIPPED | OUTPATIENT
Start: 2024-02-22 | End: 2024-03-20

## 2024-02-22 RX ORDER — BLOOD SUGAR DIAGNOSTIC
1 STRIP MISCELLANEOUS 3 TIMES DAILY
Qty: 100 STRIP | Refills: 3 | Status: SHIPPED | OUTPATIENT
Start: 2024-02-22

## 2024-02-22 RX ORDER — INSULIN GLARGINE 100 [IU]/ML
INJECTION, SOLUTION SUBCUTANEOUS
Qty: 90 ML | Refills: 1 | Status: SHIPPED | OUTPATIENT
Start: 2024-02-22 | End: 2024-05-13

## 2024-02-22 NOTE — LETTER
2/22/2024         RE: Kev Gilbert  [address] [facility] [id number]        Dear Colleague,    Thank you for referring your patient, Kev Gilbert, to the [facility]. Please see a copy of my visit note below.    Diabetes Self-Management Education & Support    Presents for: Individual review    Type of Service: Telephone Visit    Originating Location (Patient Location): Home  Distant Location (Provider Location): [facility]  Mode of Communication:  Telephone    Telephone Visit Start Time:  11:00 AM  Telephone Visit End Time (telephone visit stop time): 11:37 AM    How would patient like to obtain AVS? MyChart      ASSESSMENT:  Met with Kev today to discuss his type 2 DM. Patient is checking glucose once daily fasting - 180-200 mg/dL (under 200 mg/dL the past few days with increase to 40 units BID of Lantus). In the past patient has tried the keto diet and felt great on it along with losing 100 lbs, but has since gained it back. We reviewed small changes to diet and trying to meal prep more. 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    Discussed monitoring glucose more - reviewed benefit of CGM - patient interested in trying one. Also discussed medications, patient was diagnosed 1 year ago and only been on insulin. Patient not opposed to other medication, just would like more information. Discussed Metformin and Jardiance. Metformin decided on as Jardiance is not appropriate currently with poor healing wounds.    Patient's most recent   Lab Results   Component Value Date    A1C 10.1 02/19/2024     is not meeting goal of <7.0    Diabetes knowledge and skills assessment:   Patient is knowledgeable in diabetes management concepts related to: Taking Medication    Continue education with the following diabetes management concepts: Healthy Eating, Being Active, Monitoring, Taking Medication, Problem Solving, Reducing  "Risks, and Healthy Coping    Based on learning assessment above, most appropriate setting for further diabetes education would be: Individual setting.      PLAN  Increase Lantus to 42 units twice daily  Start monitoring glucose with Nara 3 CGM (3 times daily)  Recommend starting Metformin  mg/day -titrating up to 2,000 mg/day  Topics to cover at upcoming visits: Healthy Eating, Being Active, Monitoring, Taking Medication, Problem Solving, Reducing Risks, and Healthy Coping    Follow-up: 2-4 weeks    See Care Plan for co-developed, patient-state behavior change goals.  AVS provided for patient today.    Education Materials Provided:  No new materials provided today      SUBJECTIVE/OBJECTIVE:  Presents for: Individual review  Accompanied by: Self  Diabetes education in the past 24mo: Yes  Focus of Visit: Monitoring, Taking Medication  Diabetes type: Type 2  Date of diagnosis: 2023  Disease course: Worsening  How confident are you filling out medical forms by yourself:: Quite a bit  Cultural Influences/Ethnic Background:  Not  or     Diabetes Symptoms & Complications:  Diabetes Related Symptoms: Polydipsia (increased thirst), Slow healing wounds, Visual change  Weight trend: Stable  Symptom course: Worsening  Disease course: Worsening  Complications assessed today?: Yes  Autonomic neuropathy: No  CVA: No  Heart disease: No  Nephropathy: No  Peripheral neuropathy: No  Peripheral Vascular Disease: No  Retinopathy: No  Sexual dysfunction: No    Patient Problem List and Family Medical History reviewed for relevant medical history, current medical status, and diabetes risk factors.    Vitals:  There were no vitals taken for this visit.  Estimated body mass index is 47.76 kg/m  as calculated from the following:    Height as of 2/19/24: 1.844 m (6' 0.6\").    Weight as of 2/19/24: 162.4 kg (358 lb).   Last 3 BP:   BP Readings from Last 3 Encounters:   02/19/24 130/85   12/28/23 (!) 143/91   04/11/23 (!) " "148/86       History   Smoking Status    Every Day    Packs/day: 1.00    Years: 20.00    Types: Cigarettes   Smokeless Tobacco    Never       Labs:  Lab Results   Component Value Date    A1C 10.1 02/19/2024     Lab Results   Component Value Date     02/19/2024    GLC 93 12/06/2021    GLC 98 09/06/2019     Lab Results   Component Value Date     11/28/2023     04/11/2023     12/06/2021     09/06/2019     HDL Cholesterol   Date Value Ref Range Status   09/06/2019 38 (L) >39 mg/dL Final     Direct Measure HDL   Date Value Ref Range Status   04/11/2023 31 (L) >=40 mg/dL Final   ]  GFR Estimate   Date Value Ref Range Status   02/19/2024 >90 >60 mL/min/1.73m2 Final   09/06/2019 >90 >60 mL/min/[1.73_m2] Final     Comment:     Non  GFR Calc  Starting 12/18/2018, serum creatinine based estimated GFR (eGFR) will be   calculated using the Chronic Kidney Disease Epidemiology Collaboration   (CKD-EPI) equation.       GFR Estimate If Black   Date Value Ref Range Status   09/06/2019 >90 >60 mL/min/[1.73_m2] Final     Comment:      GFR Calc  Starting 12/18/2018, serum creatinine based estimated GFR (eGFR) will be   calculated using the Chronic Kidney Disease Epidemiology Collaboration   (CKD-EPI) equation.       Lab Results   Component Value Date    CR 0.90 02/19/2024    CR 0.85 09/06/2019     No results found for: \"MICROALBUMIN\"    Healthy Eating:  Healthy Eating Assessed Today: Yes  Cultural/[employer] diet restrictions?: No  Meal planning/habits: None  Who cooks/prepares meals for you?: Self  Who purchases food in  your home?: Self  Meals include: Breakfast, Lunch, Dinner, Evening Snack  Breakfast: villarreal, eggs, 1 toast, 1/2 cup pineapple  Lunch: typically goes 8 hours without eating  Dinner: meat (chicken/beef), potato/rice, veggie (broccoli, cauli, carrots) (not eating as much salad as he used to)  Snacks: nuts  Other: likes chocolate and juice. Works evening - " nights. Sometimes drinks cranberry juice & arnold greenberg  Beverages: Water, Juice  Has patient met with a dietitian in the past?: Yes    Being Active:  Being Active Assessed Today: No  Exercise:: Currently not exercising  Barrier to exercise: Physical limitation    Monitoring:  Monitoring Assessed Today: Yes  Did patient bring glucose meter to appointment? : Yes  Blood Glucose Meter: Accu-chek  Times checking blood sugar at home (number): 1  Times checking blood sugar at home (per): Day  Blood glucose trend: Decreasing      Taking Medications:  Diabetes Medication(s)       Biguanides       metFORMIN (GLUCOPHAGE XR) 500 MG 24 hr tablet Take 1 tablet (500 mg) by mouth daily (with dinner) for 7 days, THEN 2 tablets (1,000 mg) daily (with dinner) for 7 days, THEN 3 tablets (1,500 mg) daily (with dinner) for 7 days, THEN 4 tablets (2,000 mg) daily (with dinner) for 30 days.       Insulin       insulin glargine (LANTUS SOLOSTAR) 100 UNIT/ML pen ADMINISTER 45 UNITS UNDER THE SKIN TWICE DAILY (total daily dose 90 units/day)            Taking Medication Assessed Today: Yes  Current Treatments: Insulin Injections  Dose schedule: Pre-breakfast, At bedtime  Given by: Patient  Injection/Infusion sites: Abdomen  Problems taking diabetes medications regularly?: No  Diabetes medication side effects?: No    Problem Solving:  Problem Solving Assessed Today: Yes  Is the patient at risk for hypoglycemia?: Yes  Hypoglycemia Frequency: Never  Is the patient at risk for DKA?: No              Reducing Risks:  Reducing Risks Assessed Today: No  Diabetes Risks: Age over 45 years, Sedentary Lifestyle  CAD Risks: Diabetes Mellitus, Hypertension, Male sex, Obesity, Tobacco exposure  Has dilated eye exam at least once a year?: No  Sees dentist every 6 months?: No    Healthy Coping:  Healthy Coping Assessed Today: Yes  Emotional response to diabetes: Ready to learn  Stage of change: PREPARATION (Decided to change - considering how)  Support  resources: None  Patient Activation Measure Survey Score:      12/6/2012     9:00 AM   SAMAN Score (Last Two)   SAMAN Raw Score 39   Activation Score 56.4   SAMAN Level 3         Care Plan and Education Provided:  Care Plan: Diabetes   Updates made by Bell Pratt RD since 2/22/2024 12:00 AM        Problem: Diabetes Self-Management Education Needed to Optimize Self-Care Behaviors         Goal: Monitoring - monitor glucose and ketones as directed    This Visit's Progress: 50%   Recent Progress: 50%   Note:    I will test my glucose level 2x/day, fasting and post-dinner or bedtime           Time Spent: 30 minutes  Encounter Type: Individual    Any diabetes medication dose changes were made via the CDE Protocol per the patient's referring provider. A copy of this encounter was shared with the provider.

## 2024-02-22 NOTE — PROGRESS NOTES
Diabetes Self-Management Education & Support    Presents for: Individual review    Type of Service: Telephone Visit    Originating Location (Patient Location): Home  Distant Location (Provider Location): [facility]  Mode of Communication:  Telephone    Telephone Visit Start Time:  11:00 AM  Telephone Visit End Time (telephone visit stop time): 11:37 AM    How would patient like to obtain AVS? Latosha      ASSESSMENT:  Met with Kev today to discuss his type 2 DM. Patient is checking glucose once daily fasting - 180-200 mg/dL (under 200 mg/dL the past few days with increase to 40 units BID of Lantus). In the past patient has tried the keto diet and felt great on it along with losing 100 lbs, but has since gained it back. We reviewed small changes to diet and trying to meal prep more. 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    Discussed monitoring glucose more - reviewed benefit of CGM - patient interested in trying one. Also discussed medications, patient was diagnosed 1 year ago and only been on insulin. Patient not opposed to other medication, just would like more information. Discussed Metformin and Jardiance. Metformin decided on as Jardiance is not appropriate currently with poor healing wounds.    Patient's most recent   Lab Results   Component Value Date    A1C 10.1 02/19/2024     is not meeting goal of <7.0    Diabetes knowledge and skills assessment:   Patient is knowledgeable in diabetes management concepts related to: Taking Medication    Continue education with the following diabetes management concepts: Healthy Eating, Being Active, Monitoring, Taking Medication, Problem Solving, Reducing Risks, and Healthy Coping    Based on learning assessment above, most appropriate setting for further diabetes education would be: Individual setting.      PLAN  Increase Lantus to 42 units twice daily  Start monitoring glucose with Nara 3  "CGM (3 times daily)  Recommend starting Metformin  mg/day -titrating up to 2,000 mg/day  Topics to cover at upcoming visits: Healthy Eating, Being Active, Monitoring, Taking Medication, Problem Solving, Reducing Risks, and Healthy Coping    Follow-up: 2-4 weeks    See Care Plan for co-developed, patient-state behavior change goals.  AVS provided for patient today.    Education Materials Provided:  No new materials provided today      SUBJECTIVE/OBJECTIVE:  Presents for: Individual review  Accompanied by: Self  Diabetes education in the past 24mo: Yes  Focus of Visit: Monitoring, Taking Medication  Diabetes type: Type 2  Date of diagnosis: 2023  Disease course: Worsening  How confident are you filling out medical forms by yourself:: Quite a bit  Cultural Influences/Ethnic Background:  Not  or     Diabetes Symptoms & Complications:  Diabetes Related Symptoms: Polydipsia (increased thirst), Slow healing wounds, Visual change  Weight trend: Stable  Symptom course: Worsening  Disease course: Worsening  Complications assessed today?: Yes  Autonomic neuropathy: No  CVA: No  Heart disease: No  Nephropathy: No  Peripheral neuropathy: No  Peripheral Vascular Disease: No  Retinopathy: No  Sexual dysfunction: No    Patient Problem List and Family Medical History reviewed for relevant medical history, current medical status, and diabetes risk factors.    Vitals:  There were no vitals taken for this visit.  Estimated body mass index is 47.76 kg/m  as calculated from the following:    Height as of 2/19/24: 1.844 m (6' 0.6\").    Weight as of 2/19/24: 162.4 kg (358 lb).   Last 3 BP:   BP Readings from Last 3 Encounters:   02/19/24 130/85   12/28/23 (!) 143/91   04/11/23 (!) 148/86       History   Smoking Status    Every Day    Packs/day: 1.00    Years: 20.00    Types: Cigarettes   Smokeless Tobacco    Never       Labs:  Lab Results   Component Value Date    A1C 10.1 02/19/2024     Lab Results   Component Value " "Date     02/19/2024    GLC 93 12/06/2021    GLC 98 09/06/2019     Lab Results   Component Value Date     11/28/2023     04/11/2023     12/06/2021     09/06/2019     HDL Cholesterol   Date Value Ref Range Status   09/06/2019 38 (L) >39 mg/dL Final     Direct Measure HDL   Date Value Ref Range Status   04/11/2023 31 (L) >=40 mg/dL Final   ]  GFR Estimate   Date Value Ref Range Status   02/19/2024 >90 >60 mL/min/1.73m2 Final   09/06/2019 >90 >60 mL/min/[1.73_m2] Final     Comment:     Non  GFR Calc  Starting 12/18/2018, serum creatinine based estimated GFR (eGFR) will be   calculated using the Chronic Kidney Disease Epidemiology Collaboration   (CKD-EPI) equation.       GFR Estimate If Black   Date Value Ref Range Status   09/06/2019 >90 >60 mL/min/[1.73_m2] Final     Comment:      GFR Calc  Starting 12/18/2018, serum creatinine based estimated GFR (eGFR) will be   calculated using the Chronic Kidney Disease Epidemiology Collaboration   (CKD-EPI) equation.       Lab Results   Component Value Date    CR 0.90 02/19/2024    CR 0.85 09/06/2019     No results found for: \"MICROALBUMIN\"    Healthy Eating:  Healthy Eating Assessed Today: Yes  Cultural/[employer]ehovah's witness diet restrictions?: No  Meal planning/habits: None  Who cooks/prepares meals for you?: Self  Who purchases food in  your home?: Self  Meals include: Breakfast, Lunch, Dinner, Evening Snack  Breakfast: villarreal, eggs, 1 toast, 1/2 cup pineapple  Lunch: typically goes 8 hours without eating  Dinner: meat (chicken/beef), potato/rice, veggie (broccoli, cauli, carrots) (not eating as much salad as he used to)  Snacks: nuts  Other: likes chocolate and juice. Works evening - nights. Sometimes drinks cranberry juice & arnold greenberg  Beverages: Water, Juice  Has patient met with a dietitian in the past?: Yes    Being Active:  Being Active Assessed Today: No  Exercise:: Currently not exercising  Barrier to exercise: " Physical limitation    Monitoring:  Monitoring Assessed Today: Yes  Did patient bring glucose meter to appointment? : Yes  Blood Glucose Meter: Accu-chek  Times checking blood sugar at home (number): 1  Times checking blood sugar at home (per): Day  Blood glucose trend: Decreasing      Taking Medications:  Diabetes Medication(s)       Biguanides       metFORMIN (GLUCOPHAGE XR) 500 MG 24 hr tablet Take 1 tablet (500 mg) by mouth daily (with dinner) for 7 days, THEN 2 tablets (1,000 mg) daily (with dinner) for 7 days, THEN 3 tablets (1,500 mg) daily (with dinner) for 7 days, THEN 4 tablets (2,000 mg) daily (with dinner) for 30 days.       Insulin       insulin glargine (LANTUS SOLOSTAR) 100 UNIT/ML pen ADMINISTER 45 UNITS UNDER THE SKIN TWICE DAILY (total daily dose 90 units/day)            Taking Medication Assessed Today: Yes  Current Treatments: Insulin Injections  Dose schedule: Pre-breakfast, At bedtime  Given by: Patient  Injection/Infusion sites: Abdomen  Problems taking diabetes medications regularly?: No  Diabetes medication side effects?: No    Problem Solving:  Problem Solving Assessed Today: Yes  Is the patient at risk for hypoglycemia?: Yes  Hypoglycemia Frequency: Never  Is the patient at risk for DKA?: No              Reducing Risks:  Reducing Risks Assessed Today: No  Diabetes Risks: Age over 45 years, Sedentary Lifestyle  CAD Risks: Diabetes Mellitus, Hypertension, Male sex, Obesity, Tobacco exposure  Has dilated eye exam at least once a year?: No  Sees dentist every 6 months?: No    Healthy Coping:  Healthy Coping Assessed Today: Yes  Emotional response to diabetes: Ready to learn  Stage of change: PREPARATION (Decided to change - considering how)  Support resources: None  Patient Activation Measure Survey Score:      12/6/2012     9:00 AM   SAMAN Score (Last Two)   SAMAN Raw Score 39   Activation Score 56.4   SAMAN Level 3         Care Plan and Education Provided:  Care Plan: Diabetes   Updates made by  Bell Pratt, RD since 2/22/2024 12:00 AM        Problem: Diabetes Self-Management Education Needed to Optimize Self-Care Behaviors         Goal: Monitoring - monitor glucose and ketones as directed    This Visit's Progress: 50%   Recent Progress: 50%   Note:    I will test my glucose level 2x/day, fasting and post-dinner or bedtime           Time Spent: 30 minutes  Encounter Type: Individual    Any diabetes medication dose changes were made via the CDE Protocol per the patient's referring provider. A copy of this encounter was shared with the provider.

## 2024-02-22 NOTE — PATIENT INSTRUCTIONS
Goals for Diabetes Care:    1. Eat 3 balanced meals each day - Monitor carb intake and aim for 45-60 grams per meal  This would be equal to about 4 choices of carbohydrates. Carbohydrate 1 choice = 15 grams  Aim to eat the plate method style - half your plate fruits/veggies, 1/4 the plate protein and 1/4 plate starch (rice, potato, pasta)    2. Check blood sugars at least 3 time each day at varying times   Blood Glucose Targets:   1. Fasting and before meal target is 80 - 130   2. 2 hours after a meal target is < 180  Always remember to bring meter and log book to all appointments.    3. Activity really helps improve blood sugars. Try to Incorporate 30 minutes activity into each day - does not need to be all at one time & walking counts!    4. Treating low BG. Rule of 15 = BG 50-70 mg/dL = 15 gram carb (4 oz juice, 4 glucose tabs, OR 4 oz pop), then recheck BG in 15 minutes. If still low repeat. (If BG <50 mg/dL = 8 oz pop/juice or 8 glucose tabs).    5. Take diabetes medications as prescribed   -Lantus 42 units twice daily  -Start Metformin 500 mg ER once daily and increase by 1 tablet weekly until reaching 4 tabs (2,000 mg/day). Can take all at once at the same meal (take with food)    Follow up with your Diabetic Educator to assess BG targets and need for modifications to medications and/or lifestyle.    Call with any questions.  Thank you!  Bell Pratt RDN, LD, CDCES   Certified Diabetes Care &   [facility]  Triage [phone]

## 2024-03-06 ENCOUNTER — OFFICE VISIT (OUTPATIENT)
Dept: FAMILY MEDICINE | Facility: CLINIC | Age: 49
End: 2024-03-06
Payer: COMMERCIAL

## 2024-03-06 VITALS
BODY MASS INDEX: 48.26 KG/M2 | DIASTOLIC BLOOD PRESSURE: 75 MMHG | SYSTOLIC BLOOD PRESSURE: 130 MMHG | OXYGEN SATURATION: 97 % | WEIGHT: 315 LBS | RESPIRATION RATE: 22 BRPM | HEART RATE: 90 BPM | TEMPERATURE: 97.2 F

## 2024-03-06 DIAGNOSIS — R29.818 SUSPECTED SLEEP APNEA: ICD-10-CM

## 2024-03-06 DIAGNOSIS — Z79.4 TYPE 2 DIABETES MELLITUS WITH OTHER SPECIFIED COMPLICATION, WITH LONG-TERM CURRENT USE OF INSULIN (H): ICD-10-CM

## 2024-03-06 DIAGNOSIS — Z01.818 PREOP GENERAL PHYSICAL EXAM: Primary | ICD-10-CM

## 2024-03-06 DIAGNOSIS — E11.69 TYPE 2 DIABETES MELLITUS WITH OTHER SPECIFIED COMPLICATION, WITH LONG-TERM CURRENT USE OF INSULIN (H): ICD-10-CM

## 2024-03-06 DIAGNOSIS — E66.01 MORBID OBESITY (H): ICD-10-CM

## 2024-03-06 DIAGNOSIS — F17.210 NICOTINE DEPENDENCE, CIGARETTES, UNCOMPLICATED: ICD-10-CM

## 2024-03-06 DIAGNOSIS — N28.89 RIGHT RENAL MASS: ICD-10-CM

## 2024-03-06 PROCEDURE — 99214 OFFICE O/P EST MOD 30 MIN: CPT | Performed by: PHYSICIAN ASSISTANT

## 2024-03-06 PROCEDURE — 36415 COLL VENOUS BLD VENIPUNCTURE: CPT | Performed by: PHYSICIAN ASSISTANT

## 2024-03-06 PROCEDURE — 80048 BASIC METABOLIC PNL TOTAL CA: CPT | Performed by: PHYSICIAN ASSISTANT

## 2024-03-06 PROCEDURE — 93000 ELECTROCARDIOGRAM COMPLETE: CPT | Performed by: PHYSICIAN ASSISTANT

## 2024-03-06 RX ORDER — NICOTINE 21 MG/24HR
1 PATCH, TRANSDERMAL 24 HOURS TRANSDERMAL EVERY 24 HOURS
Qty: 30 PATCH | Refills: 0 | Status: SHIPPED | OUTPATIENT
Start: 2024-03-06 | End: 2024-03-20

## 2024-03-06 ASSESSMENT — PAIN SCALES - GENERAL: PAINLEVEL: NO PAIN (0)
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
## 2024-03-06 NOTE — PATIENT INSTRUCTIONS
Increase lantus to 42 units twice daily until the night before/morning of surgery - see below     How to Take Your Medication Before Surgery  Take all of your medications before surgery except as noted below  - HOLD (do not take) your METFORMIN on the morning of surgery.  - Take 33 units of lantus (80% of your usual dose) evening before surgery and morning of surgery  - STOP taking all vitamins and herbal supplements 7 days before surgery.  - nicotine patches: Continue on day of surgery. Patient to Inform anesthesia of patch location.       How to Manage Your Diabetes Before Surgery  If you use insulin for your diabetes, follow these steps to keep your blood sugar in a safe range before surgery, when you ve been told not to eat or drink:   Check your blood sugar every 4 hours   If your blood sugar is high, take a corrective dose (not a meal dose) of sliding-scale insulin, if that is what you re used to doing  If your blood sugar is below 100, or you have symptoms of hypoglycemia, follow these steps:   Have 1 item from this list:  4 oz (1/2 cup) of fruit juice without pulp    4 oz (1/2 cup) of regular soda (not diet soda)    3 glucose gels    5 sugar cubes or sugar packets   Check your blood sugar again after 15 minutes  Repeat steps 1 and 2 again until your blood sugar is greater than 100       Preparing for Your Surgery  Getting started  A nurse will call you to review your health history and instructions. They will give you an arrival time based on your scheduled surgery time. Please be ready to share:  Your doctor's clinic name and phone number  Your medical, surgical, and anesthesia history  A list of allergies and sensitivities  A list of medicines, including herbal treatments and over-the-counter drugs  Whether the patient has a legal guardian (ask how to send us the papers in advance)  Please tell us if you're pregnant--or if there's any chance you might be pregnant. Some surgeries may injure a fetus (unborn  baby), so they require a pregnancy test. Surgeries that are safe for a fetus don't always need a test, and you can choose whether to have one.   If you have a child who's having surgery, please ask for a copy of Preparing for Your Child's Surgery.    Preparing for surgery  Within 10 to 30 days of surgery: Have a pre-op exam (sometimes called an H&P, or History and Physical). This can be done at a clinic or pre-operative center.  If you're having a , you may not need this exam. Talk to your care team.  At your pre-op exam, talk to your care team about all medicines you take. If you need to stop any medicines before surgery, ask when to start taking them again.  We do this for your safety. Many medicines can make you bleed too much during surgery. Some change how well surgery (anesthesia) drugs work.  Call your insurance company to let them know you're having surgery. (If you don't have insurance, call [phone].)  Call your clinic if there's any change in your health. This includes signs of a cold or flu (sore throat, runny nose, cough, rash, fever). It also includes a scrape or scratch near the surgery site.  If you have questions on the day of surgery, call your hospital or surgery center.  Eating and drinking guidelines  For your safety: Unless your surgeon tells you otherwise, follow the guidelines below.  Eat and drink as usual until 8 hours before you arrive for surgery. After that, no food or milk.  Drink clear liquids until 2 hours before you arrive. These are liquids you can see through, like water, Gatorade, and Propel Water. They also include plain black coffee and tea (no cream or milk), candy, and breath mints. You can spit out gum when you arrive.  If you drink alcohol: Stop drinking it the night before surgery.  If your care team tells you to take medicine on the morning of surgery, it's okay to take it with a sip of water.  Preventing infection  Shower or bathe the night before and morning  of your surgery. Follow the instructions your clinic gave you. (If no instructions, use regular soap.)  Don't shave or clip hair near your surgery site. We'll remove the hair if needed.  Don't smoke or vape the morning of surgery. You may chew nicotine gum up to 2 hours before surgery. A nicotine patch is okay.  Note: Some surgeries require you to completely quit smoking and nicotine. Check with your surgeon.  Your care team will make every effort to keep you safe from infection. We will:  Clean our hands often with soap and water (or an alcohol-based hand rub).  Clean the skin at your surgery site with a special soap that kills germs.  Give you a special gown to keep you warm. (Cold raises the risk of infection.)  Wear special hair covers, masks, gowns and gloves during surgery.  Give antibiotic medicine, if prescribed. Not all surgeries need antibiotics.  What to bring on the day of surgery  Photo ID and insurance card  Copy of your health care directive, if you have one  Glasses and hearing aids (bring cases)  You can't wear contacts during surgery  Inhaler and eye drops, if you use them (tell us about these when you arrive)  CPAP machine or breathing device, if you use them  A few personal items, if spending the night  If you have . . .  A pacemaker, ICD (cardiac defibrillator) or other implant: Bring the ID card.  An implanted stimulator: Bring the remote control.  A legal guardian: Bring a copy of the certified (court-stamped) guardianship papers.  Please remove any jewelry, including body piercings. Leave jewelry and other valuables at home.  If you're going home the day of surgery  You must have a responsible adult drive you home. They should stay with you overnight as well.  If you don't have someone to stay with you, and you aren't safe to go home alone, we may keep you overnight. Insurance often won't pay for this.  After surgery  If it's hard to control your pain or you need more pain medicine, please  call your surgeon's office.  Questions?   If you have any questions for your care team, list them here: _________________________________________________________________________________________________________________________________________________________________________ ____________________________________ ____________________________________ ____________________________________  For informational purposes only. Not to replace the advice of your health care provider. Copyright   2003, 2019 [facility]. All rights reserved. Clinically reviewed by Courtney Lacey MD. [employer] [id number] - REV 12/22.

## 2024-03-07 ENCOUNTER — TELEPHONE (OUTPATIENT)
Dept: FAMILY MEDICINE | Facility: CLINIC | Age: 49
End: 2024-03-07

## 2024-03-07 ENCOUNTER — VIRTUAL VISIT (OUTPATIENT)
Dept: EDUCATION SERVICES | Facility: CLINIC | Age: 49
End: 2024-03-07
Payer: COMMERCIAL

## 2024-03-07 DIAGNOSIS — Z79.4 TYPE 2 DIABETES MELLITUS WITH OTHER SPECIFIED COMPLICATION, WITH LONG-TERM CURRENT USE OF INSULIN (H): Primary | ICD-10-CM

## 2024-03-07 DIAGNOSIS — E11.69 TYPE 2 DIABETES MELLITUS WITH OTHER SPECIFIED COMPLICATION, WITH LONG-TERM CURRENT USE OF INSULIN (H): Primary | ICD-10-CM

## 2024-03-07 LAB
ANION GAP SERPL CALCULATED.3IONS-SCNC: 9 MMOL/L (ref 7–15)
BUN SERPL-MCNC: 17.5 MG/DL (ref 6–20)
CALCIUM SERPL-MCNC: 9.3 MG/DL (ref 8.6–10)
CHLORIDE SERPL-SCNC: 103 MMOL/L (ref 98–107)
CREAT SERPL-MCNC: 0.88 MG/DL (ref 0.67–1.17)
DEPRECATED HCO3 PLAS-SCNC: 26 MMOL/L (ref 22–29)
EGFRCR SERPLBLD CKD-EPI 2021: >90 ML/MIN/1.73M2
GLUCOSE SERPL-MCNC: 236 MG/DL (ref 70–99)
POTASSIUM SERPL-SCNC: 3.9 MMOL/L (ref 3.4–5.3)
SODIUM SERPL-SCNC: 138 MMOL/L (ref 135–145)

## 2024-03-07 PROCEDURE — 98967 PH1 ASSMT&MGMT NQHP 11-20: CPT | Mod: 93 | Performed by: DIETITIAN, REGISTERED

## 2024-03-07 NOTE — TELEPHONE ENCOUNTER
----- Message from Carole Hernández PA-C sent at 3/7/2024  7:17 AM CST -----  Please let pt know kidney function and electrolytes remain normal. Nonfasting blood sugar is elevated to 236. Increase lantus to 42u BID as discussed at visit yesterday, see diabetes education today as scheduled.   Carole Hernández PA-C on 3/7/2024 at 7:17 AM

## 2024-03-07 NOTE — LETTER
3/7/2024         RE: Kev Gilbert  [address] [facility] [id number]        Dear Colleague,    Thank you for referring your patient, Kev Gilbert, to the [facility]. Please see a copy of my visit note below.    Diabetes Self-Management Education & Support    Presents for: Individual review    Type of Service: Telephone Visit    Originating Location (Patient Location): Home  Distant Location (Provider Location): [facility]  Mode of Communication:  Telephone    Telephone Visit Start Time:  11:00 AM  Telephone Visit End Time (telephone visit stop time): 11:16 AM    How would patient like to obtain AVS? MyChart      ASSESSMENT:  Spoke with Kev today regarding glucose control. He has noticed improved fasting glucose readings with the addition of 500 mg of metformin. Fastin typically is 130-170 mg/dL. Pt has not increased his Lantus yet, but plans to today. Patient is trying to eat less sweets as well. Reviewed importance of good glucose control for overall healthy and kidney health.  Patient has scheduled nephrectomy next week - reviewed plans discussed with PCP.  May need to consider other meds. Also reviewed importance of checking glucose 3-4 times/day.    Patient's most recent   Lab Results   Component Value Date    A1C 10.1 02/19/2024     is not meeting goal of <7.0    Diabetes knowledge and skills assessment:   Patient is knowledgeable in diabetes management concepts related to: Taking Medication    Continue education with the following diabetes management concepts: Healthy Eating, Being Active, Monitoring, Taking Medication, Problem Solving, Reducing Risks, and Healthy Coping    Based on learning assessment above, most appropriate setting for further diabetes education would be: Individual setting.      PLAN  Continue 42 units Lantus BID (patient is increasing today)  Metformin 500 mg/day until surgery - plans discussed with PCP. Plan to hold before surgery and  "resume only after labs checked  Check glucose 3-4 times a day with CGM Nara 3 - patient plans to go pick this up today    Topics to cover at upcoming visits: Healthy Eating, Being Active, Monitoring, Taking Medication, Problem Solving, Reducing Risks, and Healthy Coping    Follow-up: 4-6 weeks    See Care Plan for co-developed, patient-state behavior change goals.  AVS provided for patient today.    Education Materials Provided:  No new materials provided today      SUBJECTIVE/OBJECTIVE:  Presents for: Individual review  Accompanied by: Self  Diabetes education in the past 24mo: Yes  Focus of Visit: Monitoring, Taking Medication  Diabetes type: Type 2  Date of diagnosis: 2023  Disease course: Worsening  How confident are you filling out medical forms by yourself:: Quite a bit  Cultural Influences/Ethnic Background:  Not  or       Diabetes Symptoms & Complications:  Diabetes Related Symptoms: Polydipsia (increased thirst), Slow healing wounds, Visual change  Weight trend: Stable  Symptom course: Worsening  Disease course: Worsening  Complications assessed today?: Yes  Autonomic neuropathy: No  CVA: No  Heart disease: No  Nephropathy: No  Peripheral neuropathy: No  Peripheral Vascular Disease: No  Retinopathy: No  Sexual dysfunction: No    Patient Problem List and Family Medical History reviewed for relevant medical history, current medical status, and diabetes risk factors.    Vitals:  There were no vitals taken for this visit.  Estimated body mass index is 48.26 kg/m  as calculated from the following:    Height as of 2/19/24: 1.844 m (6' 0.6\").    Weight as of 3/6/24: 164.1 kg (361 lb 12.8 oz).   Last 3 BP:   BP Readings from Last 3 Encounters:   03/06/24 130/75   02/19/24 130/85   12/28/23 (!) 143/91       History   Smoking Status     Every Day     Packs/day: 1.00     Years: 20.00     Types: Cigarettes   Smokeless Tobacco     Never       Labs:  Lab Results   Component Value Date    A1C 10.1 " "02/19/2024     Lab Results   Component Value Date     03/06/2024    GLC 93 12/06/2021    GLC 98 09/06/2019     Lab Results   Component Value Date     11/28/2023     04/11/2023     12/06/2021     09/06/2019     HDL Cholesterol   Date Value Ref Range Status   09/06/2019 38 (L) >39 mg/dL Final     Direct Measure HDL   Date Value Ref Range Status   04/11/2023 31 (L) >=40 mg/dL Final   ]  GFR Estimate   Date Value Ref Range Status   03/06/2024 >90 >60 mL/min/1.73m2 Final   09/06/2019 >90 >60 mL/min/[1.73_m2] Final     Comment:     Non  GFR Calc  Starting 12/18/2018, serum creatinine based estimated GFR (eGFR) will be   calculated using the Chronic Kidney Disease Epidemiology Collaboration   (CKD-EPI) equation.       GFR Estimate If Black   Date Value Ref Range Status   09/06/2019 >90 >60 mL/min/[1.73_m2] Final     Comment:      GFR Calc  Starting 12/18/2018, serum creatinine based estimated GFR (eGFR) will be   calculated using the Chronic Kidney Disease Epidemiology Collaboration   (CKD-EPI) equation.       Lab Results   Component Value Date    CR 0.88 03/06/2024    CR 0.85 09/06/2019     No results found for: \"MICROALBUMIN\"    Healthy Eating:  Healthy Eating Assessed Today: Yes  Cultural/Sabianist diet restrictions?: No  Meal planning/habits: None  Who cooks/prepares meals for you?: Self  Who purchases food in  your home?: Self  Meals include: Breakfast, Lunch, Dinner, Evening Snack  Breakfast: villarreal, eggs, 1 toast, 1/2 cup pineapple  Lunch: typically goes 8 hours without eating  Dinner: meat (chicken/beef), potato/rice, veggie (broccoli, cauli, carrots) (not eating as much salad as he used to)  Snacks: nuts  Other: likes chocolate and juice. Works evening - nights. Sometimes drinks cranberry juice & arnold greenberg  Beverages: Water, Juice  Has patient met with a dietitian in the past?: Yes    Being Active:  Being Active Assessed Today: No  Exercise:: " Currently not exercising  Barrier to exercise: Physical limitation    Monitoring:  Monitoring Assessed Today: Yes  Did patient bring glucose meter to appointment? : Yes  Blood Glucose Meter: Accu-chek  Times checking blood sugar at home (number): 1  Times checking blood sugar at home (per): Day  Blood glucose trend: Decreasing      Taking Medications:  Diabetes Medication(s)       Biguanides       metFORMIN (GLUCOPHAGE XR) 500 MG 24 hr tablet Take 1 tablet (500 mg) by mouth daily (with dinner) for 7 days, THEN 2 tablets (1,000 mg) daily (with dinner) for 7 days, THEN 3 tablets (1,500 mg) daily (with dinner) for 7 days, THEN 4 tablets (2,000 mg) daily (with dinner) for 30 days.       Insulin       insulin glargine (LANTUS SOLOSTAR) 100 UNIT/ML pen ADMINISTER 45 UNITS UNDER THE SKIN TWICE DAILY (total daily dose 90 units/day)            Taking Medication Assessed Today: Yes  Current Treatments: Insulin Injections  Dose schedule: Pre-breakfast, At bedtime  Given by: Patient  Injection/Infusion sites: Abdomen  Problems taking diabetes medications regularly?: No  Diabetes medication side effects?: No    Problem Solving:  Problem Solving Assessed Today: Yes  Is the patient at risk for hypoglycemia?: Yes  Hypoglycemia Frequency: Never  Is the patient at risk for DKA?: No              Reducing Risks:  Reducing Risks Assessed Today: No  Diabetes Risks: Age over 45 years, Sedentary Lifestyle  CAD Risks: Diabetes Mellitus, Hypertension, Male sex, Obesity, Tobacco exposure  Has dilated eye exam at least once a year?: No  Sees dentist every 6 months?: No    Healthy Coping:  Healthy Coping Assessed Today: Yes  Emotional response to diabetes: Ready to learn  Stage of change: PREPARATION (Decided to change - considering how)  Support resources: None  Patient Activation Measure Survey Score:      12/6/2012     9:00 AM   SAMAN Score (Last Two)   SAMAN Raw Score 39   Activation Score 56.4   SAMAN Level 3         Care Plan and Education  Provided:  Care Plan: Diabetes   Updates made by Bell Pratt RD since 3/7/2024 12:00 AM        Problem: Diabetes Self-Management Education Needed to Optimize Self-Care Behaviors         Goal: Healthy Eating - follow a healthy eating pattern for diabetes         Task: Provide education on managing carbohydrate intake (carbohydrate counting, plate planning method, etc.) Completed 3/7/2024   Responsible User: Carlie Sanchez RD        Goal: Monitoring - monitor glucose and ketones as directed    This Visit's Progress: 50%   Recent Progress: 50%   Note:    I will test my glucose level 2x/day, fasting and post-dinner or bedtime       Goal: Taking Medication - patient is consistently taking medications as directed         Task: Provide education on action of prescribed medication, including when to take and possible side effects Completed 3/7/2024   Responsible User: Carlie Sanchez RD        Goal: Reducing Risks - know how to prevent and treat long-term diabetes complications         Task: Provide education on Hemoglobin A1c - goals and relationship to blood glucose levels Completed 3/7/2024   Responsible User: Carlie Sanchez RD        Goal: Healthy Coping - use available resources to cope with the challenges of managing diabetes         Task: Discuss recognizing feelings about having diabetes    Responsible User: Carlie Sanchez RD            Time Spent: 16 minutes  Encounter Type: Individual    Any diabetes medication dose changes were made via the CDE Protocol per the patient's referring provider. A copy of this encounter was shared with the provider.

## 2024-03-07 NOTE — PATIENT INSTRUCTIONS
Goals for Diabetes Care:    1. Eat 3 balanced meals each day - Monitor carb intake and aim for 45-60 grams per meal  This would be equal to about 4 choices of carbohydrates. Carbohydrate 1 choice = 15 grams  Aim to eat the plate method style - half your plate fruits/veggies, 1/4 the plate protein and 1/4 plate starch (rice, potato, pasta)    2. Check blood sugars at least 3-4 times each day at varying times   Blood Glucose Targets:   1. Fasting and before meal target is 80 - 130   2. 2 hours after a meal target is < 180  Always remember to bring meter and log book to all appointments.    3. Activity really helps improve blood sugars. Try to Incorporate 30 minutes activity into each day - does not need to be all at one time & walking counts!    4. Treating low BG. Rule of 15 = BG 50-70 mg/dL = 15 gram carb (4 oz juice, 4 glucose tabs, OR 4 oz pop), then recheck BG in 15 minutes. If still low repeat. (If BG <50 mg/dL = 8 oz pop/juice or 8 glucose tabs).    5. Take diabetes medications as prescribed   -Metformin 500 mg/day until surgery: HOLD before surgery and until labs and MD discussing restarting based on labs  -Continue lantus 42 units twice daily    Follow up with your Diabetic Educator to assess BG targets and need for modifications to medications and/or lifestyle.    Call with any questions.  Thank you!  Bell Pratt RDN, LD, [facility]   Certified Diabetes Care &   [facility]  Triage [phone]

## 2024-03-07 NOTE — TELEPHONE ENCOUNTER
Patient given result message from Carole Hernández PA-C .  Patient verbalizes understanding and agrees with plan. Nyla Hernández RN

## 2024-03-07 NOTE — PROGRESS NOTES
Diabetes Self-Management Education & Support    Presents for: Individual review    Type of Service: Telephone Visit    Originating Location (Patient Location): Home  Distant Location (Provider Location): [facility]  Mode of Communication:  Telephone    Telephone Visit Start Time:  11:00 AM  Telephone Visit End Time (telephone visit stop time): 11:16 AM    How would patient like to obtain AVS? Latosha      ASSESSMENT:  Spoke with Kev today regarding glucose control. He has noticed improved fasting glucose readings with the addition of 500 mg of metformin. Fastin typically is 130-170 mg/dL. Pt has not increased his Lantus yet, but plans to today. Patient is trying to eat less sweets as well. Reviewed importance of good glucose control for overall healthy and kidney health.  Patient has scheduled nephrectomy next week - reviewed plans discussed with PCP.  May need to consider other meds. Also reviewed importance of checking glucose 3-4 times/day.    Patient's most recent   Lab Results   Component Value Date    A1C 10.1 02/19/2024     is not meeting goal of <7.0    Diabetes knowledge and skills assessment:   Patient is knowledgeable in diabetes management concepts related to: Taking Medication    Continue education with the following diabetes management concepts: Healthy Eating, Being Active, Monitoring, Taking Medication, Problem Solving, Reducing Risks, and Healthy Coping    Based on learning assessment above, most appropriate setting for further diabetes education would be: Individual setting.      PLAN  Continue 42 units Lantus BID (patient is increasing today)  Metformin 500 mg/day until surgery - plans discussed with PCP. Plan to hold before surgery and resume only after labs checked  Check glucose 3-4 times a day with CGM Nara 3 - patient plans to go pick this up today    Topics to cover at upcoming visits: Healthy Eating, Being Active, Monitoring, Taking Medication, Problem Solving,  "Reducing Risks, and Healthy Coping    Follow-up: 4-6 weeks    See Care Plan for co-developed, patient-state behavior change goals.  AVS provided for patient today.    Education Materials Provided:  No new materials provided today      SUBJECTIVE/OBJECTIVE:  Presents for: Individual review  Accompanied by: Self  Diabetes education in the past 24mo: Yes  Focus of Visit: Monitoring, Taking Medication  Diabetes type: Type 2  Date of diagnosis: 2023  Disease course: Worsening  How confident are you filling out medical forms by yourself:: Quite a bit  Cultural Influences/Ethnic Background:  Not  or       Diabetes Symptoms & Complications:  Diabetes Related Symptoms: Polydipsia (increased thirst), Slow healing wounds, Visual change  Weight trend: Stable  Symptom course: Worsening  Disease course: Worsening  Complications assessed today?: Yes  Autonomic neuropathy: No  CVA: No  Heart disease: No  Nephropathy: No  Peripheral neuropathy: No  Peripheral Vascular Disease: No  Retinopathy: No  Sexual dysfunction: No    Patient Problem List and Family Medical History reviewed for relevant medical history, current medical status, and diabetes risk factors.    Vitals:  There were no vitals taken for this visit.  Estimated body mass index is 48.26 kg/m  as calculated from the following:    Height as of 2/19/24: 1.844 m (6' 0.6\").    Weight as of 3/6/24: 164.1 kg (361 lb 12.8 oz).   Last 3 BP:   BP Readings from Last 3 Encounters:   03/06/24 130/75   02/19/24 130/85   12/28/23 (!) 143/91       History   Smoking Status    Every Day    Packs/day: 1.00    Years: 20.00    Types: Cigarettes   Smokeless Tobacco    Never       Labs:  Lab Results   Component Value Date    A1C 10.1 02/19/2024     Lab Results   Component Value Date     03/06/2024    GLC 93 12/06/2021    GLC 98 09/06/2019     Lab Results   Component Value Date     11/28/2023     04/11/2023     12/06/2021     09/06/2019     HDL " "Cholesterol   Date Value Ref Range Status   09/06/2019 38 (L) >39 mg/dL Final     Direct Measure HDL   Date Value Ref Range Status   04/11/2023 31 (L) >=40 mg/dL Final   ]  GFR Estimate   Date Value Ref Range Status   03/06/2024 >90 >60 mL/min/1.73m2 Final   09/06/2019 >90 >60 mL/min/[1.73_m2] Final     Comment:     Non  GFR Calc  Starting 12/18/2018, serum creatinine based estimated GFR (eGFR) will be   calculated using the Chronic Kidney Disease Epidemiology Collaboration   (CKD-EPI) equation.       GFR Estimate If Black   Date Value Ref Range Status   09/06/2019 >90 >60 mL/min/[1.73_m2] Final     Comment:      GFR Calc  Starting 12/18/2018, serum creatinine based estimated GFR (eGFR) will be   calculated using the Chronic Kidney Disease Epidemiology Collaboration   (CKD-EPI) equation.       Lab Results   Component Value Date    CR 0.88 03/06/2024    CR 0.85 09/06/2019     No results found for: \"MICROALBUMIN\"    Healthy Eating:  Healthy Eating Assessed Today: Yes  Cultural/Caodaism diet restrictions?: No  Meal planning/habits: None  Who cooks/prepares meals for you?: Self  Who purchases food in  your home?: Self  Meals include: Breakfast, Lunch, Dinner, Evening Snack  Breakfast: villarreal, eggs, 1 toast, 1/2 cup pineapple  Lunch: typically goes 8 hours without eating  Dinner: meat (chicken/beef), potato/rice, veggie (broccoli, cauli, carrots) (not eating as much salad as he used to)  Snacks: nuts  Other: likes chocolate and juice. Works evening - nights. Sometimes drinks cranberry juice & arnold greenberg  Beverages: Water, Juice  Has patient met with a dietitian in the past?: Yes    Being Active:  Being Active Assessed Today: No  Exercise:: Currently not exercising  Barrier to exercise: Physical limitation    Monitoring:  Monitoring Assessed Today: Yes  Did patient bring glucose meter to appointment? : Yes  Blood Glucose Meter: Accu-chek  Times checking blood sugar at home (number): " 1  Times checking blood sugar at home (per): Day  Blood glucose trend: Decreasing      Taking Medications:  Diabetes Medication(s)       Biguanides       metFORMIN (GLUCOPHAGE XR) 500 MG 24 hr tablet Take 1 tablet (500 mg) by mouth daily (with dinner) for 7 days, THEN 2 tablets (1,000 mg) daily (with dinner) for 7 days, THEN 3 tablets (1,500 mg) daily (with dinner) for 7 days, THEN 4 tablets (2,000 mg) daily (with dinner) for 30 days.       Insulin       insulin glargine (LANTUS SOLOSTAR) 100 UNIT/ML pen ADMINISTER 45 UNITS UNDER THE SKIN TWICE DAILY (total daily dose 90 units/day)            Taking Medication Assessed Today: Yes  Current Treatments: Insulin Injections  Dose schedule: Pre-breakfast, At bedtime  Given by: Patient  Injection/Infusion sites: Abdomen  Problems taking diabetes medications regularly?: No  Diabetes medication side effects?: No    Problem Solving:  Problem Solving Assessed Today: Yes  Is the patient at risk for hypoglycemia?: Yes  Hypoglycemia Frequency: Never  Is the patient at risk for DKA?: No              Reducing Risks:  Reducing Risks Assessed Today: No  Diabetes Risks: Age over 45 years, Sedentary Lifestyle  CAD Risks: Diabetes Mellitus, Hypertension, Male sex, Obesity, Tobacco exposure  Has dilated eye exam at least once a year?: No  Sees dentist every 6 months?: No    Healthy Coping:  Healthy Coping Assessed Today: Yes  Emotional response to diabetes: Ready to learn  Stage of change: PREPARATION (Decided to change - considering how)  Support resources: None  Patient Activation Measure Survey Score:      12/6/2012     9:00 AM   SAMAN Score (Last Two)   SAMAN Raw Score 39   Activation Score 56.4   SAMAN Level 3         Care Plan and Education Provided:  Care Plan: Diabetes   Updates made by Bell Pratt RD since 3/7/2024 12:00 AM        Problem: Diabetes Self-Management Education Needed to Optimize Self-Care Behaviors         Goal: Healthy Eating - follow a healthy eating pattern  for diabetes         Task: Provide education on managing carbohydrate intake (carbohydrate counting, plate planning method, etc.) Completed 3/7/2024   Responsible User: Carlie Sanchez RD        Goal: Monitoring - monitor glucose and ketones as directed    This Visit's Progress: 50%   Recent Progress: 50%   Note:    I will test my glucose level 2x/day, fasting and post-dinner or bedtime       Goal: Taking Medication - patient is consistently taking medications as directed         Task: Provide education on action of prescribed medication, including when to take and possible side effects Completed 3/7/2024   Responsible User: Carlie Sanchez RD        Goal: Reducing Risks - know how to prevent and treat long-term diabetes complications         Task: Provide education on Hemoglobin A1c - goals and relationship to blood glucose levels Completed 3/7/2024   Responsible User: Carlie Sanchez RD        Goal: Healthy Coping - use available resources to cope with the challenges of managing diabetes         Task: Discuss recognizing feelings about having diabetes    Responsible User: Carlie Sanchez RD            Time Spent: 16 minutes  Encounter Type: Individual    Any diabetes medication dose changes were made via the CDE Protocol per the patient's referring provider. A copy of this encounter was shared with the provider.

## 2024-03-20 ENCOUNTER — OFFICE VISIT (OUTPATIENT)
Dept: FAMILY MEDICINE | Facility: CLINIC | Age: 49
End: 2024-03-20
Payer: COMMERCIAL

## 2024-03-20 VITALS
TEMPERATURE: 97 F | HEART RATE: 95 BPM | WEIGHT: 315 LBS | BODY MASS INDEX: 46.69 KG/M2 | OXYGEN SATURATION: 98 % | SYSTOLIC BLOOD PRESSURE: 130 MMHG | DIASTOLIC BLOOD PRESSURE: 85 MMHG | RESPIRATION RATE: 16 BRPM

## 2024-03-20 DIAGNOSIS — E11.69 TYPE 2 DIABETES MELLITUS WITH OTHER SPECIFIED COMPLICATION, WITH LONG-TERM CURRENT USE OF INSULIN (H): ICD-10-CM

## 2024-03-20 DIAGNOSIS — R06.83 SNORING: ICD-10-CM

## 2024-03-20 DIAGNOSIS — Z79.4 TYPE 2 DIABETES MELLITUS WITH OTHER SPECIFIED COMPLICATION, WITH LONG-TERM CURRENT USE OF INSULIN (H): ICD-10-CM

## 2024-03-20 DIAGNOSIS — E66.01 MORBID OBESITY (H): ICD-10-CM

## 2024-03-20 DIAGNOSIS — Z90.5 HISTORY OF RIGHT NEPHRECTOMY: Primary | ICD-10-CM

## 2024-03-20 DIAGNOSIS — R79.89 ELEVATED SERUM CREATININE: ICD-10-CM

## 2024-03-20 DIAGNOSIS — L02.92 BOILS: ICD-10-CM

## 2024-03-20 PROCEDURE — 99214 OFFICE O/P EST MOD 30 MIN: CPT | Performed by: PHYSICIAN ASSISTANT

## 2024-03-20 PROCEDURE — 80048 BASIC METABOLIC PNL TOTAL CA: CPT | Performed by: PHYSICIAN ASSISTANT

## 2024-03-20 PROCEDURE — 36415 COLL VENOUS BLD VENIPUNCTURE: CPT | Performed by: PHYSICIAN ASSISTANT

## 2024-03-20 ASSESSMENT — PAIN SCALES - GENERAL: PAINLEVEL: MODERATE PAIN (4)

## 2024-03-20 NOTE — PATIENT INSTRUCTIONS
Call urology office to see when follow up is needed to review results    I will call you with lab results     Continue increasing insulin by 2 units twice daily every 2-3 days as long as blood sugars are over 200 until you're at 42 units twice daily     I referred you for sleep study

## 2024-03-20 NOTE — PROGRESS NOTES
Assessment & Plan     History of right nephrectomy  Recovering well. Needs follow up with urology but is unsure when this should be, advised calling [facility] for next steps and review of results.   - Basic metabolic panel  (Ca, Cl, CO2, Creat, Gluc, K, Na, BUN)    Type 2 diabetes mellitus with other specified complication, with long-term current use of insulin (H)  Blood sugars back up over 200 since discharge. Increase lantus by 2u BID every 2-3 days if fasting glucose remains over 200.   Remain off metformin, unlikely to restart this given recent nephrectomy and creatinine elevation.  Has follow up with DM education in 1 month, he will plan to call me with glucose readings and insulin dosing in 2 weeks.  - Basic metabolic panel  (Ca, Cl, CO2, Creat, Gluc, K, Na, BUN)    Morbid obesity (H)  Snoring  - Adult Sleep Eval & Management  Referral    Boils  Recurrent boils, left groin with area of firmness but no evidence of active abscess or infection at this time. Advised trial of hibiclens for prevention. Also discussed diabetes control will be very important for avoiding recurrent infections which he understands.   - chlorhexidine (HIBICLENS) 4 % liquid  Dispense: 236 mL; Refill: 2    MED REC REQUIRED  Post Medication Reconciliation Status:  Discharge medications reconciled and changed, see notes/orders    Follow up with me in 2 months, recheck A1c at that time    Carole Hernández PA-C on 3/20/2024 at 4:29 PM      Rosario Angulo is a 48 year old, presenting for the following health issues:  Hospital F/U        3/20/2024     3:52 PM   Additional Questions   Roomed by Stan LAUGHLIN     [facility] Follow-up Visit:    Hospital/Nursing Home/IP Rehab Facility:  [facility]  Date of Admission: 3/12  Date of Discharge: 3/14  Reason(s) for Admission: Renal Mass    Was your hospitalization related to COVID-19? No   Problems taking medications regularly:  None  Medication changes since discharge: None  Problems adhering  to non-medication therapy:  None    Summary of hospitalization:  CareEverywhere information obtained and reviewed - no discharge summary available at time of visit, reviewed most recent progress note   Diagnostic Tests/Treatments reviewed.  Follow up needed: VITO  Other Healthcare Providers Involved in Patient s Care:         Surgical follow-up appointment - unsure when he is to see surgeon again  Update since discharge: improved.   Plan of care communicated with patient     S/P right nephrectomy for right renal mass 3/12/24 at [facility]  Discharge summary not available for my review   Last progress note from hospitalist reviewed dated 3/14/24    - elevated creatinine to 1.44 - stable during hospital stay and expected s/p nephrectomy, needs BMP today  - DM2 - holding metformin, decreased lantus to 35u BID due to decreased in oral intake. Glucoses started to jump up over 200 (around 210) 3/16 and then increased to 37u BID for the last few days. Glucose this AM  212  - likely JORGE - needs sleep study once recovered from surgery      Review of Systems  Constitutional, HEENT, cardiovascular, pulmonary, gi and gu systems are negative, except as otherwise noted.      Objective    /85   Pulse 95   Temp 97  F (36.1  C) (Tympanic)   Resp 16   Wt (!) 158.8 kg (350 lb)   SpO2 98%   BMI 46.69 kg/m    Body mass index is 46.69 kg/m .  GENERAL: alert and no distress  RESP: lungs clear to auscultation - no rales, rhonchi or wheezes  CV: regular rate and rhythm, normal S1 S2, no S3 or S4, no murmur, click or rub, no peripheral edema  SKIN: left groin with palpable area of firmness without fluctuance, no overlying erythema or swelling noted. Nontender.   NEURO: Normal strength and tone, mentation intact and speech normal  PSYCH: mentation appears normal, affect normal/bright        Signed Electronically by: Carole Hernández PA-C on 3/20/2024 at 4:21 PM

## 2024-03-21 LAB
ANION GAP SERPL CALCULATED.3IONS-SCNC: 11 MMOL/L (ref 7–15)
BUN SERPL-MCNC: 19.9 MG/DL (ref 6–20)
CALCIUM SERPL-MCNC: 9.2 MG/DL (ref 8.6–10)
CHLORIDE SERPL-SCNC: 100 MMOL/L (ref 98–107)
CREAT SERPL-MCNC: 1.4 MG/DL (ref 0.67–1.17)
DEPRECATED HCO3 PLAS-SCNC: 25 MMOL/L (ref 22–29)
EGFRCR SERPLBLD CKD-EPI 2021: 61 ML/MIN/1.73M2
GLUCOSE SERPL-MCNC: 190 MG/DL (ref 70–99)
POTASSIUM SERPL-SCNC: 4.5 MMOL/L (ref 3.4–5.3)
SODIUM SERPL-SCNC: 136 MMOL/L (ref 135–145)

## 2024-03-22 NOTE — RESULT ENCOUNTER NOTE
Pt prefers phone call with results, doesn't check MyChart    Please let him know that kidney function is stable from hospital discharge. I'd like to recheck again in 2-4 weeks, OK for lab only appointment.   Blood sugar was 190 at the time of the blood draw. No change in the plan we discussed at his visit.     Carole Hernández PA-C on 3/22/2024 at 8:04 AM

## 2024-04-03 ENCOUNTER — TRANSFERRED RECORDS (OUTPATIENT)
Dept: HEALTH INFORMATION MANAGEMENT | Facility: CLINIC | Age: 49
End: 2024-04-03
Payer: COMMERCIAL

## 2024-04-18 ENCOUNTER — VIRTUAL VISIT (OUTPATIENT)
Dept: EDUCATION SERVICES | Facility: CLINIC | Age: 49
End: 2024-04-18
Payer: COMMERCIAL

## 2024-04-18 DIAGNOSIS — Z79.4 TYPE 2 DIABETES MELLITUS WITH DIABETIC NEUROPATHY, WITH LONG-TERM CURRENT USE OF INSULIN (H): Primary | ICD-10-CM

## 2024-04-18 DIAGNOSIS — E11.40 TYPE 2 DIABETES MELLITUS WITH DIABETIC NEUROPATHY, WITH LONG-TERM CURRENT USE OF INSULIN (H): Primary | ICD-10-CM

## 2024-04-18 PROCEDURE — 98968 PH1 ASSMT&MGMT NQHP 21-30: CPT | Mod: 93 | Performed by: DIETITIAN, REGISTERED

## 2024-04-18 NOTE — PROGRESS NOTES
Diabetes Self-Management Education & Support    Presents for: Individual review    Type of Service: Telephone Visit    Originating Location (Patient Location): Home  Distant Location (Provider Location): Offsite  Mode of Communication:  Telephone    Telephone Visit Start Time:  11:01 AM  Telephone Visit End Time (telephone visit stop time): 11:22 AM    How would patient like to obtain AVS? Latosha      ASSESSMENT:  Kev reports glucose have been better and fasting readings have been around 130-150 mg/dL. Patient states the last few days they have increased to 200 mg/dL at times. Patient is taking 42 units of Lantus twice daily. Advised him to start the Nara 3 CGM - patient had some questions about it, but is planning to start the CGM to review glucose and share with provider.   Encouraged patient to continue to work on improving glucose. Discussed risks of infection and complications with elevated glucose.     Since nephrectomy patient has off metformin.       Patient's most recent   Lab Results   Component Value Date    A1C 10.1 02/19/2024     is not meeting goal of <7.0    Diabetes knowledge and skills assessment:   Patient is knowledgeable in diabetes management concepts related to: Taking Medication and Healthy Coping    Continue education with the following diabetes management concepts: Healthy Eating, Being Active, Monitoring, and Taking Medication    Based on learning assessment above, most appropriate setting for further diabetes education would be: Individual setting.      PLAN  Check glucose 3-4 times day with CGM - patient to message when he has CGM working.  Continue Lantus 42 units twice daily. Discussed increasing by 2 units every 3 days if fasting glucose is greater than 150 mg/dL    Topics to cover at upcoming visits: Healthy Eating, Being Active, Monitoring, and Taking Medication    Follow-up: 4-8 weeks    See Care Plan for co-developed, patient-state behavior change goals.  AVS provided for  "patient today.    Education Materials Provided:  No new materials provided today      SUBJECTIVE/OBJECTIVE:  Presents for: Individual review  Accompanied by: Self  Diabetes education in the past 24mo: Yes  Focus of Visit: Monitoring, Taking Medication  Diabetes type: Type 2  Date of diagnosis: 2023  Disease course: Improving  How confident are you filling out medical forms by yourself:: Quite a bit  Difficulty affording diabetes medication?: No  Difficulty affording diabetes testing supplies?: No  Other concerns:: None  Cultural Influences/Ethnic Background:  Not  or     Diabetes Symptoms & Complications:  Diabetes Related Symptoms: Polydipsia (increased thirst), Slow healing wounds, Visual change  Weight trend: Stable  Symptom course: Worsening  Disease course: Improving  Complications assessed today?: Yes  Autonomic neuropathy: No  CVA: No  Heart disease: No  Nephropathy: No  Peripheral neuropathy: No  Peripheral Vascular Disease: No  Retinopathy: No  Sexual dysfunction: No    Patient Problem List and Family Medical History reviewed for relevant medical history, current medical status, and diabetes risk factors.    Vitals:  There were no vitals taken for this visit.  Estimated body mass index is 46.69 kg/m  as calculated from the following:    Height as of 2/19/24: 1.844 m (6' 0.6\").    Weight as of 3/20/24: 158.8 kg (350 lb).   Last 3 BP:   BP Readings from Last 3 Encounters:   03/20/24 130/85   03/06/24 130/75   02/19/24 130/85       History   Smoking Status    Every Day    Packs/day: 1.50    Types: Cigarettes    Start date: 1/1/1985   Smokeless Tobacco    Never       Labs:  Lab Results   Component Value Date    A1C 10.1 02/19/2024     Lab Results   Component Value Date     03/20/2024    GLC 93 12/06/2021    GLC 98 09/06/2019     Lab Results   Component Value Date     11/28/2023     04/11/2023     12/06/2021     09/06/2019     HDL Cholesterol   Date Value Ref " "Range Status   09/06/2019 38 (L) >39 mg/dL Final     Direct Measure HDL   Date Value Ref Range Status   04/11/2023 31 (L) >=40 mg/dL Final   ]  GFR Estimate   Date Value Ref Range Status   03/20/2024 61 >60 mL/min/1.73m2 Final   09/06/2019 >90 >60 mL/min/[1.73_m2] Final     Comment:     Non  GFR Calc  Starting 12/18/2018, serum creatinine based estimated GFR (eGFR) will be   calculated using the Chronic Kidney Disease Epidemiology Collaboration   (CKD-EPI) equation.       GFR Estimate If Black   Date Value Ref Range Status   09/06/2019 >90 >60 mL/min/[1.73_m2] Final     Comment:      GFR Calc  Starting 12/18/2018, serum creatinine based estimated GFR (eGFR) will be   calculated using the Chronic Kidney Disease Epidemiology Collaboration   (CKD-EPI) equation.       Lab Results   Component Value Date    CR 1.40 03/20/2024    CR 0.85 09/06/2019     No results found for: \"MICROALBUMIN\"    Healthy Eating:  Healthy Eating Assessed Today: Yes  Cultural/Hindu diet restrictions?: No  Meal planning/habits: None  Who cooks/prepares meals for you?: Self  Who purchases food in  your home?: Self  Meals include: Breakfast, Lunch, Dinner, Evening Snack  Breakfast: villarreal, eggs, 1 toast, 1/2 cup pineapple  Lunch: typically goes 8 hours without eating  Dinner: meat (chicken/beef), potato/rice, veggie (broccoli, cauli, carrots) (not eating as much salad as he used to)  Snacks: nuts  Other: likes chocolate and juice. Works evening - nights. Sometimes drinks cranberry juice & arnold greenberg  Beverages: Water, Juice  Has patient met with a dietitian in the past?: Yes    Being Active:  Being Active Assessed Today: No  Exercise:: Currently not exercising  Barrier to exercise: Physical limitation    Monitoring:  Monitoring Assessed Today: Yes  Did patient bring glucose meter to appointment? : Yes  Blood Glucose Meter: Accu-chek  Times checking blood sugar at home (number): 1  Times checking blood sugar at " home (per): Day  Blood glucose trend: Decreasing      Taking Medications:  Diabetes Medication(s)       Insulin       insulin glargine (LANTUS SOLOSTAR) 100 UNIT/ML pen ADMINISTER 45 UNITS UNDER THE SKIN TWICE DAILY (total daily dose 90 units/day)            Taking Medication Assessed Today: Yes  Current Treatments: Insulin Injections  Dose schedule: Pre-breakfast, At bedtime  Given by: Patient  Injection/Infusion sites: Abdomen  Problems taking diabetes medications regularly?: No  Diabetes medication side effects?: No    Problem Solving:  Problem Solving Assessed Today: Yes  Is the patient at risk for hypoglycemia?: Yes  Hypoglycemia Frequency: Never  Is the patient at risk for DKA?: No              Reducing Risks:  Reducing Risks Assessed Today: No  Diabetes Risks: Age over 45 years, Sedentary Lifestyle  CAD Risks: Diabetes Mellitus, Hypertension, Male sex, Obesity, Tobacco exposure  Has dilated eye exam at least once a year?: No  Sees dentist every 6 months?: No    Healthy Coping:  Healthy Coping Assessed Today: Yes  Emotional response to diabetes: Ready to learn  Stage of change: ACTION (Actively working towards change)  Support resources: None  Patient Activation Measure Survey Score:      12/6/2012     9:00 AM   SAMAN Score (Last Two)   SAMAN Raw Score 39   Activation Score 56.4   SAMAN Level 3         Care Plan and Education Provided:  Care Plan: Diabetes   Updates made by Bell Pratt RD since 4/18/2024 12:00 AM        Problem: Diabetes Self-Management Education Needed to Optimize Self-Care Behaviors         Goal: Monitoring - monitor glucose and ketones as directed    This Visit's Progress: 50%   Recent Progress: 50%   Note:    I will test my glucose level 2x/day, fasting and post-dinner or bedtime       Goal: Reducing Risks - know how to prevent and treat long-term diabetes complications         Task: Provide education on major complications of diabetes, prevention, early diagnostic measures and  treatment of complications Completed 4/18/2024   Responsible User: Carlie Sanchez RD          Time Spent: 21 minutes  Encounter Type: Individual    Any diabetes medication dose changes were made via the CDE Protocol per the patient's referring provider. A copy of this encounter was shared with the provider.

## 2024-04-18 NOTE — LETTER
4/18/2024         RE: Kev Gilbert  [address] [facility] [id number]        Dear Colleague,    Thank you for referring your patient, Kev Gilbert, to the [facility]. Please see a copy of my visit note below.    Diabetes Self-Management Education & Support    Presents for: Individual review    Type of Service: Telephone Visit    Originating Location (Patient Location): Home  Distant Location (Provider Location): Offsite  Mode of Communication:  Telephone    Telephone Visit Start Time:  11:01 AM  Telephone Visit End Time (telephone visit stop time): 11:22 AM    How would patient like to obtain AVS? MyChart      ASSESSMENT:  Kev reports glucose have been better and fasting readings have been around 130-150 mg/dL. Patient states the last few days they have increased to 200 mg/dL at times. Patient is taking 42 units of Lantus twice daily. Advised him to start the Nara 3 CGM - patient had some questions about it, but is planning to start the CGM to review glucose and share with provider.   Encouraged patient to continue to work on improving glucose. Discussed risks of infection and complications with elevated glucose.     Since nephrectomy patient has off metformin.       Patient's most recent   Lab Results   Component Value Date    A1C 10.1 02/19/2024     is not meeting goal of <7.0    Diabetes knowledge and skills assessment:   Patient is knowledgeable in diabetes management concepts related to: Taking Medication and Healthy Coping    Continue education with the following diabetes management concepts: Healthy Eating, Being Active, Monitoring, and Taking Medication    Based on learning assessment above, most appropriate setting for further diabetes education would be: Individual setting.      PLAN  Check glucose 3-4 times day with CGM - patient to message when he has CGM working.  Continue Lantus 42 units twice daily. Discussed increasing by 2 units every 3 days if fasting glucose is  "greater than 150 mg/dL    Topics to cover at upcoming visits: Healthy Eating, Being Active, Monitoring, and Taking Medication    Follow-up: 4-8 weeks    See Care Plan for co-developed, patient-state behavior change goals.  AVS provided for patient today.    Education Materials Provided:  No new materials provided today      SUBJECTIVE/OBJECTIVE:  Presents for: Individual review  Accompanied by: Self  Diabetes education in the past 24mo: Yes  Focus of Visit: Monitoring, Taking Medication  Diabetes type: Type 2  Date of diagnosis: 2023  Disease course: Improving  How confident are you filling out medical forms by yourself:: Quite a bit  Difficulty affording diabetes medication?: No  Difficulty affording diabetes testing supplies?: No  Other concerns:: None  Cultural Influences/Ethnic Background:  Not  or     Diabetes Symptoms & Complications:  Diabetes Related Symptoms: Polydipsia (increased thirst), Slow healing wounds, Visual change  Weight trend: Stable  Symptom course: Worsening  Disease course: Improving  Complications assessed today?: Yes  Autonomic neuropathy: No  CVA: No  Heart disease: No  Nephropathy: No  Peripheral neuropathy: No  Peripheral Vascular Disease: No  Retinopathy: No  Sexual dysfunction: No    Patient Problem List and Family Medical History reviewed for relevant medical history, current medical status, and diabetes risk factors.    Vitals:  There were no vitals taken for this visit.  Estimated body mass index is 46.69 kg/m  as calculated from the following:    Height as of 2/19/24: 1.844 m (6' 0.6\").    Weight as of 3/20/24: 158.8 kg (350 lb).   Last 3 BP:   BP Readings from Last 3 Encounters:   03/20/24 130/85   03/06/24 130/75   02/19/24 130/85       History   Smoking Status     Every Day     Packs/day: 1.50     Types: Cigarettes     Start date: 1/1/1985   Smokeless Tobacco     Never       Labs:  Lab Results   Component Value Date    A1C 10.1 02/19/2024     Lab Results " "  Component Value Date     03/20/2024    GLC 93 12/06/2021    GLC 98 09/06/2019     Lab Results   Component Value Date     11/28/2023     04/11/2023     12/06/2021     09/06/2019     HDL Cholesterol   Date Value Ref Range Status   09/06/2019 38 (L) >39 mg/dL Final     Direct Measure HDL   Date Value Ref Range Status   04/11/2023 31 (L) >=40 mg/dL Final   ]  GFR Estimate   Date Value Ref Range Status   03/20/2024 61 >60 mL/min/1.73m2 Final   09/06/2019 >90 >60 mL/min/[1.73_m2] Final     Comment:     Non  GFR Calc  Starting 12/18/2018, serum creatinine based estimated GFR (eGFR) will be   calculated using the Chronic Kidney Disease Epidemiology Collaboration   (CKD-EPI) equation.       GFR Estimate If Black   Date Value Ref Range Status   09/06/2019 >90 >60 mL/min/[1.73_m2] Final     Comment:      GFR Calc  Starting 12/18/2018, serum creatinine based estimated GFR (eGFR) will be   calculated using the Chronic Kidney Disease Epidemiology Collaboration   (CKD-EPI) equation.       Lab Results   Component Value Date    CR 1.40 03/20/2024    CR 0.85 09/06/2019     No results found for: \"MICROALBUMIN\"    Healthy Eating:  Healthy Eating Assessed Today: Yes  Cultural/Jainism diet restrictions?: No  Meal planning/habits: None  Who cooks/prepares meals for you?: Self  Who purchases food in  your home?: Self  Meals include: Breakfast, Lunch, Dinner, Evening Snack  Breakfast: villarreal, eggs, 1 toast, 1/2 cup pineapple  Lunch: typically goes 8 hours without eating  Dinner: meat (chicken/beef), potato/rice, veggie (broccoli, cauli, carrots) (not eating as much salad as he used to)  Snacks: nuts  Other: likes chocolate and juice. Works evening - nights. Sometimes drinks cranberry juice & arnold greenberg  Beverages: Water, Juice  Has patient met with a dietitian in the past?: Yes    Being Active:  Being Active Assessed Today: No  Exercise:: Currently not " exercising  Barrier to exercise: Physical limitation    Monitoring:  Monitoring Assessed Today: Yes  Did patient bring glucose meter to appointment? : Yes  Blood Glucose Meter: Accu-chek  Times checking blood sugar at home (number): 1  Times checking blood sugar at home (per): Day  Blood glucose trend: Decreasing      Taking Medications:  Diabetes Medication(s)       Insulin       insulin glargine (LANTUS SOLOSTAR) 100 UNIT/ML pen ADMINISTER 45 UNITS UNDER THE SKIN TWICE DAILY (total daily dose 90 units/day)            Taking Medication Assessed Today: Yes  Current Treatments: Insulin Injections  Dose schedule: Pre-breakfast, At bedtime  Given by: Patient  Injection/Infusion sites: Abdomen  Problems taking diabetes medications regularly?: No  Diabetes medication side effects?: No    Problem Solving:  Problem Solving Assessed Today: Yes  Is the patient at risk for hypoglycemia?: Yes  Hypoglycemia Frequency: Never  Is the patient at risk for DKA?: No              Reducing Risks:  Reducing Risks Assessed Today: No  Diabetes Risks: Age over 45 years, Sedentary Lifestyle  CAD Risks: Diabetes Mellitus, Hypertension, Male sex, Obesity, Tobacco exposure  Has dilated eye exam at least once a year?: No  Sees dentist every 6 months?: No    Healthy Coping:  Healthy Coping Assessed Today: Yes  Emotional response to diabetes: Ready to learn  Stage of change: ACTION (Actively working towards change)  Support resources: None  Patient Activation Measure Survey Score:      12/6/2012     9:00 AM   SAMAN Score (Last Two)   SAMAN Raw Score 39   Activation Score 56.4   SAMAN Level 3         Care Plan and Education Provided:  Care Plan: Diabetes   Updates made by Bell Pratt RD since 4/18/2024 12:00 AM        Problem: Diabetes Self-Management Education Needed to Optimize Self-Care Behaviors         Goal: Monitoring - monitor glucose and ketones as directed    This Visit's Progress: 50%   Recent Progress: 50%   Note:    I will test my  glucose level 2x/day, fasting and post-dinner or bedtime       Goal: Reducing Risks - know how to prevent and treat long-term diabetes complications         Task: Provide education on major complications of diabetes, prevention, early diagnostic measures and treatment of complications Completed 4/18/2024   Responsible User: Carlie Sanchez RD          Time Spent: 21 minutes  Encounter Type: Individual    Any diabetes medication dose changes were made via the CDE Protocol per the patient's referring provider. A copy of this encounter was shared with the provider.

## 2024-04-18 NOTE — PATIENT INSTRUCTIONS
Goals for Diabetes Care:    1. Eat 3 balanced meals each day - Monitor carb intake and aim for 45-60 grams per meal  This would be equal to about 4 choices of carbohydrates. Carbohydrate 1 choice = 15 grams  Aim to eat the plate method style - half your plate fruits/veggies, 1/4 the plate protein and 1/4 plate starch (rice, potato, pasta)    2. Check blood sugars at least 3-4 time each day at varying times   Blood Glucose Targets:   1. Fasting and before meal target is 80 - 130   2. 2 hours after a meal target is < 180  Always remember to bring meter and log book to all appointments.    3. Activity really helps improve blood sugars. Try to Incorporate 30 minutes activity into each day - does not need to be all at one time & walking counts!    4. Treating low BG. Rule of 15 = BG 50-70 mg/dL = 15 gram carb (4 oz juice, 4 glucose tabs, OR 4 oz pop), then recheck BG in 15 minutes. If still low repeat. (If BG <50 mg/dL = 8 oz pop/juice or 8 glucose tabs).    5. Take diabetes medications as prescribed   -Lantus 42 units twice daily  Increase by 2 units every 3 days if fasting glucose is greater than 150 mg/dL    Follow up with your Diabetic Educator to assess BG targets and need for modifications to medications and/or lifestyle.    Call with any questions.  Thank you!  Bell Pratt RDN, LD, [facility]   Certified Diabetes Care &   [facility]  Triage [phone]

## 2024-04-24 ENCOUNTER — OFFICE VISIT (OUTPATIENT)
Dept: FAMILY MEDICINE | Facility: CLINIC | Age: 49
End: 2024-04-24
Payer: COMMERCIAL

## 2024-04-24 VITALS
TEMPERATURE: 96.8 F | SYSTOLIC BLOOD PRESSURE: 136 MMHG | DIASTOLIC BLOOD PRESSURE: 88 MMHG | HEART RATE: 112 BPM | RESPIRATION RATE: 18 BRPM | BODY MASS INDEX: 42.66 KG/M2 | OXYGEN SATURATION: 99 % | WEIGHT: 315 LBS | HEIGHT: 72 IN

## 2024-04-24 DIAGNOSIS — Z79.4 TYPE 2 DIABETES MELLITUS WITHOUT COMPLICATION, WITH LONG-TERM CURRENT USE OF INSULIN (H): ICD-10-CM

## 2024-04-24 DIAGNOSIS — N50.82 SCROTAL PAIN: ICD-10-CM

## 2024-04-24 DIAGNOSIS — E11.9 TYPE 2 DIABETES MELLITUS WITHOUT COMPLICATION, WITH LONG-TERM CURRENT USE OF INSULIN (H): ICD-10-CM

## 2024-04-24 DIAGNOSIS — Z12.11 SCREEN FOR COLON CANCER: ICD-10-CM

## 2024-04-24 DIAGNOSIS — R79.89 BLOOD CREATININE INCREASED COMPARED WITH PRIOR MEASUREMENT: Primary | ICD-10-CM

## 2024-04-24 PROBLEM — C64.1 MALIGNANT NEOPLASM OF RIGHT KIDNEY, EXCEPT RENAL PELVIS (H): Status: ACTIVE | Noted: 2024-03-12

## 2024-04-24 PROBLEM — E87.20 METABOLIC ACIDOSIS: Status: RESOLVED | Noted: 2023-02-11 | Resolved: 2024-04-24

## 2024-04-24 PROBLEM — E11.65 TYPE 2 DIABETES MELLITUS WITH HYPERGLYCEMIA, WITH LONG-TERM CURRENT USE OF INSULIN (H): Status: ACTIVE | Noted: 2024-03-12

## 2024-04-24 PROBLEM — G47.33 OSA (OBSTRUCTIVE SLEEP APNEA): Status: ACTIVE | Noted: 2024-03-12

## 2024-04-24 PROBLEM — L02.214 ABSCESS OF LEFT GROIN: Status: RESOLVED | Noted: 2023-02-11 | Resolved: 2024-04-24

## 2024-04-24 PROBLEM — F17.200 SMOKER: Status: RESOLVED | Noted: 2023-02-11 | Resolved: 2024-04-24

## 2024-04-24 PROBLEM — L03.90 CELLULITIS: Status: RESOLVED | Noted: 2023-02-11 | Resolved: 2024-04-24

## 2024-04-24 LAB
ALBUMIN UR-MCNC: ABNORMAL MG/DL
APPEARANCE UR: CLEAR
BACTERIA #/AREA URNS HPF: NORMAL /HPF
BASOPHILS # BLD AUTO: 0.1 10E3/UL (ref 0–0.2)
BASOPHILS NFR BLD AUTO: 1 %
BILIRUB UR QL STRIP: NEGATIVE
COLOR UR AUTO: YELLOW
EOSINOPHIL # BLD AUTO: 0.2 10E3/UL (ref 0–0.7)
EOSINOPHIL NFR BLD AUTO: 2 %
ERYTHROCYTE [DISTWIDTH] IN BLOOD BY AUTOMATED COUNT: 12.8 % (ref 10–15)
GLUCOSE UR STRIP-MCNC: 100 MG/DL
HCT VFR BLD AUTO: 48.8 % (ref 40–53)
HGB BLD-MCNC: 16.3 G/DL (ref 13.3–17.7)
HGB UR QL STRIP: NEGATIVE
IMM GRANULOCYTES # BLD: 0 10E3/UL
IMM GRANULOCYTES NFR BLD: 0 %
KETONES UR STRIP-MCNC: NEGATIVE MG/DL
LEUKOCYTE ESTERASE UR QL STRIP: NEGATIVE
LYMPHOCYTES # BLD AUTO: 3.4 10E3/UL (ref 0.8–5.3)
LYMPHOCYTES NFR BLD AUTO: 32 %
MCH RBC QN AUTO: 29.7 PG (ref 26.5–33)
MCHC RBC AUTO-ENTMCNC: 33.4 G/DL (ref 31.5–36.5)
MCV RBC AUTO: 89 FL (ref 78–100)
MONOCYTES # BLD AUTO: 0.8 10E3/UL (ref 0–1.3)
MONOCYTES NFR BLD AUTO: 7 %
NEUTROPHILS # BLD AUTO: 6 10E3/UL (ref 1.6–8.3)
NEUTROPHILS NFR BLD AUTO: 58 %
NITRATE UR QL: NEGATIVE
PH UR STRIP: 6.5 [PH] (ref 5–7)
PLATELET # BLD AUTO: 208 10E3/UL (ref 150–450)
RBC # BLD AUTO: 5.49 10E6/UL (ref 4.4–5.9)
RBC #/AREA URNS AUTO: NORMAL /HPF
SP GR UR STRIP: >=1.03 (ref 1–1.03)
SQUAMOUS #/AREA URNS AUTO: NORMAL /LPF
UROBILINOGEN UR STRIP-ACNC: 0.2 E.U./DL
WBC # BLD AUTO: 10.5 10E3/UL (ref 4–11)
WBC #/AREA URNS AUTO: NORMAL /HPF

## 2024-04-24 PROCEDURE — 36415 COLL VENOUS BLD VENIPUNCTURE: CPT | Performed by: FAMILY MEDICINE

## 2024-04-24 PROCEDURE — 82570 ASSAY OF URINE CREATININE: CPT | Performed by: FAMILY MEDICINE

## 2024-04-24 PROCEDURE — 80061 LIPID PANEL: CPT | Performed by: FAMILY MEDICINE

## 2024-04-24 PROCEDURE — 82043 UR ALBUMIN QUANTITATIVE: CPT | Performed by: FAMILY MEDICINE

## 2024-04-24 PROCEDURE — 80053 COMPREHEN METABOLIC PANEL: CPT | Performed by: FAMILY MEDICINE

## 2024-04-24 PROCEDURE — 99214 OFFICE O/P EST MOD 30 MIN: CPT | Performed by: FAMILY MEDICINE

## 2024-04-24 PROCEDURE — 81001 URINALYSIS AUTO W/SCOPE: CPT | Performed by: FAMILY MEDICINE

## 2024-04-24 PROCEDURE — 85025 COMPLETE CBC W/AUTO DIFF WBC: CPT | Performed by: FAMILY MEDICINE

## 2024-04-24 PROCEDURE — 83721 ASSAY OF BLOOD LIPOPROTEIN: CPT | Mod: 59 | Performed by: FAMILY MEDICINE

## 2024-04-24 NOTE — PROGRESS NOTES
Assessment & Plan     Blood creatinine increased compared with prior measurement  Await results of renal testing.  Creatinine on discharge from his right nephrectomy hospitalization in mid March was 1.47 with more recent creatinine of 1.7.  Will augment this by checking his glomerular filtration rate, urinalysis and urine albumin.  May need to consider nephrology consultation given his solitary kidney.  - Comprehensive metabolic panel (BMP + Alb, Alk Phos, ALT, AST, Total. Bili, TP); Future  - UA Macroscopic with reflex to Microscopic and Culture - Lab Collect; Future  - Albumin Random Urine Quantitative with Creat Ratio; Future  - Comprehensive metabolic panel (BMP + Alb, Alk Phos, ALT, AST, Total. Bili, TP)  - UA Macroscopic with reflex to Microscopic and Culture - Lab Collect  - Albumin Random Urine Quantitative with Creat Ratio  - UA Microscopic with Reflex to Culture    Scrotal pain  Concerned about continued scrotal edema and pain following 10-day course of doxycycline for clinical diagnosis of epididymitis.  His CBC shows a normal white blood cell count today and urinalysis is negative for hematuria.  This may reduce clinical suspicion of infection.  I do not believe this is a hernia nor hydrocele based on exam today.  Will obtain scrotal ultrasound as initial evaluation.  Consider CT scan of the abdomen and pelvis.  Advised to present to emergency department if increased pain or fever.  - Comprehensive metabolic panel (BMP + Alb, Alk Phos, ALT, AST, Total. Bili, TP); Future  - CBC with platelets and differential; Future  - UA Macroscopic with reflex to Microscopic and Culture - Lab Collect; Future  - Albumin Random Urine Quantitative with Creat Ratio; Future  - US Testicular & Scrotum w Doppler Ltd; Future  - Comprehensive metabolic panel (BMP + Alb, Alk Phos, ALT, AST, Total. Bili, TP)  - CBC with platelets and differential  - UA Macroscopic with reflex to Microscopic and Culture - Lab Collect  - Albumin  Random Urine Quantitative with Creat Ratio  - UA Microscopic with Reflex to Culture    Type 2 diabetes mellitus without complication, with long-term current use of insulin (H)  Type 2 diabetes would appear to be poorly controlled as measured by hemoglobin A1c of 10.12 months ago.  He is presently on Lantus up to 45 units twice daily.  We discussed metformin and its utilization in chronic kidney disease which will need to be mindful of in Kev's case.  We also discussed GLP-1 agonist that may be helpful with both weight loss and glycemic control.  He is apprehensive to use this because he has heard some negative side effects from these medications.  Should his renal function continue to be impaired, could consider SGLT2 inhibitor as well.  He will follow-up with his primary care provider in [place] for this in about 3 weeks.  - Lipid panel reflex to direct LDL Non-fasting; Future  - Lipid panel reflex to direct LDL Non-fasting    Screen for colon cancer  Orders placed today.  - Colonoscopy Screening  Referral; Future                  Subjective   Kev is a 48 year old, presenting for the following health issues:  follow up labs      4/24/2024    10:05 AM   Additional Questions   Roomed by aniya adams     History of Present Illness       CKD: He uses over the counter pain medication, including tylenol, a few times a month.    Diabetes:   He presents for follow up of diabetes.  He is checking home blood glucose one time daily.   He checks blood glucose before meals.  Blood glucose is sometimes over 200 and never under 70.  When his blood glucose is low, the patient is asymptomatic for confusion, blurred vision, lethargy and reports not feeling dizzy, shaky, or weak.  He is concerned about frequent infections and low blood sugar, several less than 70 in the past few weeks.    He is not experiencing numbness or burning in feet, excessive thirst, blurry vision, weight changes or redness, sores or blisters on  feet. The patient has not had a diabetic eye exam in the last 12 months.          He eats 2-3 servings of fruits and vegetables daily.He consumes 1 sweetened beverage(s) daily.He exercises with enough effort to increase his heart rate 9 or less minutes per day.  He exercises with enough effort to increase his heart rate 3 or less days per week.   He is taking medications regularly.     Creatinine elevated at 1.7 on recent oncology appointment and advised to follow up.  Recently had right nephrectomy for renal cell carcinoma so now has a solitary kidney.  Had urology appointment with Dr. Maurer recently with good result from his nephrectomy, however he was noted to have right-sided epididymitis.  He was placed on a 10-day course of doxycycline which Kev reports has decreased his right hemiscrotal pain somewhat but really has not done anything for the edema that is there.  He reports this is interfering with his ability to return to work.  He has had no fevers, nausea or vomiting associated with this.  He denies any abdominal pain.    Checking blood sugars daily, typically getting 150's-170;s.  Last A1C 10.1 about two months ago.  Presently on Lantus for blood sugar control.                      Objective    /88   Pulse 112   Temp 96.8  F (36  C)   Resp 18   Ht 1.829 m (6')   Wt (!) 161.9 kg (357 lb)   SpO2 99%   BMI 48.42 kg/m    Body mass index is 48.42 kg/m .  Physical Exam   GENERAL: alert and no distress  NECK: no adenopathy, no asymmetry, masses, or scars  RESP: lungs clear to auscultation - no rales, rhonchi or wheezes  CV: regular rate and rhythm, normal S1 S2, no S3 or S4, no murmur, click or rub, no peripheral edema  ABDOMEN: soft, nontender, without hepatosplenomegaly or masses, bowel sounds normal, and well-healed incision from trocar insertion sites as well has nephrectomy incision in his right lower quadrant.   (male): testicles normal without atrophy or masses, epididymal enlargement  right that is tender to palpation.  There is no transillumination.  There is approximately a 3 cm area of fluctuance in the superior portion of the right hemiscrotum. no hernias, and penis normal without urethral discharge  MS: no gross musculoskeletal defects noted, no edema    Results for orders placed or performed in visit on 04/24/24 (from the past 24 hour(s))   CBC with platelets and differential    Narrative    The following orders were created for panel order CBC with platelets and differential.  Procedure                               Abnormality         Status                     ---------                               -----------         ------                     CBC with platelets and d...[[id number]]                      Final result                 Please view results for these tests on the individual orders.   CBC with platelets and differential   Result Value Ref Range    WBC Count 10.5 4.0 - 11.0 10e3/uL    RBC Count 5.49 4.40 - 5.90 10e6/uL    Hemoglobin 16.3 13.3 - 17.7 g/dL    Hematocrit 48.8 40.0 - 53.0 %    MCV 89 78 - 100 fL    MCH 29.7 26.5 - 33.0 pg    MCHC 33.4 31.5 - 36.5 g/dL    RDW 12.8 10.0 - 15.0 %    Platelet Count 208 150 - 450 10e3/uL    % Neutrophils 58 %    % Lymphocytes 32 %    % Monocytes 7 %    % Eosinophils 2 %    % Basophils 1 %    % Immature Granulocytes 0 %    Absolute Neutrophils 6.0 1.6 - 8.3 10e3/uL    Absolute Lymphocytes 3.4 0.8 - 5.3 10e3/uL    Absolute Monocytes 0.8 0.0 - 1.3 10e3/uL    Absolute Eosinophils 0.2 0.0 - 0.7 10e3/uL    Absolute Basophils 0.1 0.0 - 0.2 10e3/uL    Absolute Immature Granulocytes 0.0 <=0.4 10e3/uL   UA Macroscopic with reflex to Microscopic and Culture - Lab Collect    Specimen: Urine, Midstream   Result Value Ref Range    Color Urine Yellow Colorless, Straw, Light Yellow, Yellow    Appearance Urine Clear Clear    Glucose Urine 100 (A) Negative mg/dL    Bilirubin Urine Negative Negative    Ketones Urine Negative Negative mg/dL    Specific  Gravity Urine >=1.030 1.003 - 1.035    Blood Urine Negative Negative    pH Urine 6.5 5.0 - 7.0    Protein Albumin Urine Trace (A) Negative mg/dL    Urobilinogen Urine 0.2 0.2, 1.0 E.U./dL    Nitrite Urine Negative Negative    Leukocyte Esterase Urine Negative Negative   UA Microscopic with Reflex to Culture   Result Value Ref Range    Bacteria Urine None Seen None Seen /HPF    RBC Urine 0-2 0-2 /HPF /HPF    WBC Urine 0-5 0-5 /HPF /HPF    Squamous Epithelials Urine None Seen None Seen /LPF    Narrative    Urine Culture not indicated           Signed Electronically by: Bruno Tamayo Jr, MD

## 2024-04-24 NOTE — RESULT ENCOUNTER NOTE
Mr. Gilbert,    -Normal red blood cell (hgb) levels, normal white blood cell count and normal platelet levels.  -Urine glucose (sugar) in it.  This is likely due to elevated blood sugars from your type 2 diabetes.  Please follow-up with Carole Hernández in [place] about this.    If you have further questions about the interpretation of your labs, [email or web address] is a good website to check out for further information.    Please contact the clinic if you have additional questions.  Thank you.    Sincerely,    Bruno Tamayo MD

## 2024-04-25 ENCOUNTER — ANCILLARY PROCEDURE (OUTPATIENT)
Dept: ULTRASOUND IMAGING | Facility: CLINIC | Age: 49
End: 2024-04-25
Attending: FAMILY MEDICINE
Payer: COMMERCIAL

## 2024-04-25 DIAGNOSIS — N50.82 SCROTAL PAIN: ICD-10-CM

## 2024-04-25 LAB
ALBUMIN SERPL BCG-MCNC: 4.4 G/DL (ref 3.5–5.2)
ALP SERPL-CCNC: 110 U/L (ref 40–150)
ALT SERPL W P-5'-P-CCNC: 31 U/L (ref 0–70)
ANION GAP SERPL CALCULATED.3IONS-SCNC: 10 MMOL/L (ref 7–15)
AST SERPL W P-5'-P-CCNC: 21 U/L (ref 0–45)
BILIRUB SERPL-MCNC: 0.3 MG/DL
BUN SERPL-MCNC: 20 MG/DL (ref 6–20)
CALCIUM SERPL-MCNC: 9.4 MG/DL (ref 8.6–10)
CHLORIDE SERPL-SCNC: 101 MMOL/L (ref 98–107)
CHOLEST SERPL-MCNC: 218 MG/DL
CREAT SERPL-MCNC: 1.55 MG/DL (ref 0.67–1.17)
CREAT UR-MCNC: 164 MG/DL
DEPRECATED HCO3 PLAS-SCNC: 26 MMOL/L (ref 22–29)
EGFRCR SERPLBLD CKD-EPI 2021: 54 ML/MIN/1.73M2
FASTING STATUS PATIENT QL REPORTED: NO
GLUCOSE SERPL-MCNC: 192 MG/DL (ref 70–99)
HDLC SERPL-MCNC: 27 MG/DL
LDLC SERPL CALC-MCNC: ABNORMAL MG/DL
LDLC SERPL DIRECT ASSAY-MCNC: 123 MG/DL
MICROALBUMIN UR-MCNC: 23.6 MG/L
MICROALBUMIN/CREAT UR: 14.39 MG/G CR (ref 0–17)
NONHDLC SERPL-MCNC: 191 MG/DL
POTASSIUM SERPL-SCNC: 4.5 MMOL/L (ref 3.4–5.3)
PROT SERPL-MCNC: 7.2 G/DL (ref 6.4–8.3)
SODIUM SERPL-SCNC: 137 MMOL/L (ref 135–145)
TRIGL SERPL-MCNC: 478 MG/DL

## 2024-04-25 PROCEDURE — 76870 US EXAM SCROTUM: CPT

## 2024-04-26 NOTE — RESULT ENCOUNTER NOTE
Mr. Gilbert,    -HDL(good) cholesterol level is low and your triglycerides are elevated which can increase your heart disease risk.  A diet high in fat and simple carbohydrates, genetics and being overweight can contribute to this. LDL(bad) cholesterol level is normal.  ADVISE:exercising 150 minutes of aerobic exercise per week (30 minutes 5 days per week or 50 minutes 3 days per week are options), and omega-3 fatty acids (fish oil) 0653-2992 mg daily are helpful to improve this.  In 6 months, you should recheck your fasting cholesterol panel by scheduling a lab-only appointment.  -Liver and gallbladder tests (ALT,AST, Alk phos,bilirubin) are normal.  -Kidney function (GFR) is decreased.  ADVISE: rechecking this in 3 months.  It is normal to see an increase in the creatinine and decrease in GFR following a nephrectomy.  Ensure that you're not using ibuprofen or Aleve if you need a pain reliever (Tylenol is OK).  Carole should recheck this when you next see her.  -Sodium is normal.  -Potassium is normal.  -Calcium is normal.  -Glucose is elevated due to your diabetes.  -Microalbumin (urine protein) test is normal.  ADVISE: rechecking this annually.    If you have further questions about the interpretation of your labs, [email or web address] is a good website to check out for further information.    Please contact the clinic if you have additional questions.  Thank you.    Sincerely,    Bruno Tamayo MD

## 2024-04-26 NOTE — RESULT ENCOUNTER NOTE
Mr. Gilbert,    The results of your ultrasound show that you have a fluid collection on the right side of your scrotum called a hydrocele that could be contributing to the swelling you're having.  You also have a cyst in the epididymus on that side, but you've got a slightly larger on one the left and that isn't causing you problems.  Both your testicles look normal and have normal blood flow.  I recommend you return to Dr. Maurer to get his impression on things if this is not getting better.  Generally speaking, a hydrocele can be a chronic thing Kev and attempts to fix them with surgery have mixed results.  Often times I've seen patients with small hydroceles simply live with them, but think it would be a good idea to get Dr. Maurer's thoughts.    If you have further questions about the interpretation of your labs, [email or web address] is a good website to check out for further information.    Please contact the clinic if you have additional questions.  Thank you.    Sincerely,    Bruno Tamayo MD

## 2024-04-29 ENCOUNTER — MYC MEDICAL ADVICE (OUTPATIENT)
Dept: FAMILY MEDICINE | Facility: CLINIC | Age: 49
End: 2024-04-29
Payer: COMMERCIAL

## 2024-04-29 DIAGNOSIS — R10.31 RIGHT GROIN PAIN: ICD-10-CM

## 2024-04-29 DIAGNOSIS — N50.82 SCROTAL PAIN: Primary | ICD-10-CM

## 2024-04-29 NOTE — TELEPHONE ENCOUNTER
Please review , further recommendations or send to pcp?      Tyhart sent to patient asking if pain or fever    LOV 4/24   Scrotal pain  Concerned about continued scrotal edema and pain following 10-day course of doxycycline for clinical diagnosis of epididymitis.  His CBC shows a normal white blood cell count today and urinalysis is negative for hematuria.  This may reduce clinical suspicion of infection.  I do not believe this is a hernia nor hydrocele based on exam today.  Will obtain scrotal ultrasound as initial evaluation.  Consider CT scan of the abdomen and pelvis.  Advised to present to emergency department if increased pain or fever.    US 4/25 IMPRESSION:  1.  Small right hydrocele.  2.  Mild scrotal skin thickening.  3.  Otherwise, unremarkable sonographic appearance of the testicles.

## 2024-05-01 NOTE — TELEPHONE ENCOUNTER
Please see my chart message below     Please review and advise     Thank you     Radha Raymond RN, BSN  [facility]

## 2024-05-13 ENCOUNTER — OFFICE VISIT (OUTPATIENT)
Dept: FAMILY MEDICINE | Facility: CLINIC | Age: 49
End: 2024-05-13
Payer: COMMERCIAL

## 2024-05-13 ENCOUNTER — HOSPITAL ENCOUNTER (OUTPATIENT)
Dept: CT IMAGING | Facility: CLINIC | Age: 49
Discharge: HOME | End: 2024-05-13
Attending: FAMILY MEDICINE | Admitting: FAMILY MEDICINE
Payer: COMMERCIAL

## 2024-05-13 VITALS
RESPIRATION RATE: 18 BRPM | DIASTOLIC BLOOD PRESSURE: 85 MMHG | HEART RATE: 92 BPM | OXYGEN SATURATION: 97 % | BODY MASS INDEX: 42.66 KG/M2 | WEIGHT: 315 LBS | SYSTOLIC BLOOD PRESSURE: 128 MMHG | HEIGHT: 72 IN

## 2024-05-13 DIAGNOSIS — N50.82 SCROTAL PAIN: ICD-10-CM

## 2024-05-13 DIAGNOSIS — Z90.5 H/O RIGHT NEPHRECTOMY: ICD-10-CM

## 2024-05-13 DIAGNOSIS — R79.89 ELEVATED SERUM CREATININE: ICD-10-CM

## 2024-05-13 DIAGNOSIS — F41.1 GENERALIZED ANXIETY DISORDER: ICD-10-CM

## 2024-05-13 DIAGNOSIS — R10.84 ABDOMINAL PAIN, GENERALIZED: ICD-10-CM

## 2024-05-13 DIAGNOSIS — R10.31 RIGHT GROIN PAIN: ICD-10-CM

## 2024-05-13 DIAGNOSIS — C64.1 RENAL CELL CARCINOMA OF RIGHT KIDNEY (H): ICD-10-CM

## 2024-05-13 DIAGNOSIS — E11.65 TYPE 2 DIABETES MELLITUS WITH HYPERGLYCEMIA, WITH LONG-TERM CURRENT USE OF INSULIN (H): Primary | ICD-10-CM

## 2024-05-13 DIAGNOSIS — Z79.4 TYPE 2 DIABETES MELLITUS WITH HYPERGLYCEMIA, WITH LONG-TERM CURRENT USE OF INSULIN (H): Primary | ICD-10-CM

## 2024-05-13 LAB — HBA1C MFR BLD: 9.7 % (ref 0–5.6)

## 2024-05-13 PROCEDURE — 80048 BASIC METABOLIC PNL TOTAL CA: CPT | Performed by: PHYSICIAN ASSISTANT

## 2024-05-13 PROCEDURE — 83036 HEMOGLOBIN GLYCOSYLATED A1C: CPT | Performed by: PHYSICIAN ASSISTANT

## 2024-05-13 PROCEDURE — 74176 CT ABD & PELVIS W/O CONTRAST: CPT

## 2024-05-13 PROCEDURE — G2211 COMPLEX E/M VISIT ADD ON: HCPCS | Performed by: PHYSICIAN ASSISTANT

## 2024-05-13 PROCEDURE — 99214 OFFICE O/P EST MOD 30 MIN: CPT | Performed by: PHYSICIAN ASSISTANT

## 2024-05-13 PROCEDURE — 36415 COLL VENOUS BLD VENIPUNCTURE: CPT | Performed by: PHYSICIAN ASSISTANT

## 2024-05-13 RX ORDER — INSULIN GLARGINE 100 [IU]/ML
INJECTION, SOLUTION SUBCUTANEOUS
Qty: 90 ML | Refills: 1 | Status: SHIPPED | OUTPATIENT
Start: 2024-05-13

## 2024-05-13 RX ORDER — PANTOPRAZOLE SODIUM 40 MG/1
40 TABLET, DELAYED RELEASE ORAL DAILY
Qty: 90 TABLET | Refills: 1 | Status: SHIPPED | OUTPATIENT
Start: 2024-05-13

## 2024-05-13 RX ORDER — PEN NEEDLE, DIABETIC, SAFETY 30 GX3/16"
NEEDLE, DISPOSABLE MISCELLANEOUS
Qty: 200 EACH | Refills: 1 | Status: SHIPPED | OUTPATIENT
Start: 2024-05-13

## 2024-05-13 RX ORDER — VENLAFAXINE HYDROCHLORIDE 75 MG/1
CAPSULE, EXTENDED RELEASE ORAL
Qty: 90 CAPSULE | Refills: 1 | Status: SHIPPED | OUTPATIENT
Start: 2024-05-13

## 2024-05-13 ASSESSMENT — PAIN SCALES - GENERAL: PAINLEVEL: NO PAIN (0)

## 2024-05-13 NOTE — PROGRESS NOTES
Assessment & Plan     Type 2 diabetes mellitus with hyperglycemia, with long-term current use of insulin (H)  Reported high glucose readings last few days, will recheck A1c. If no significant change would recommend considering jardiance. He is not open to another injectable medication right now. I am hesitant to restart metformin unless kidney function improves. There is room for improvement in lifestyle as well which he is aware of. Will follow up with DM education in a few weeks as scheduled.   We also discussed elevated lipids and consideration of statin - not open to this at this point. Will plan to recheck lipids fasting at next visit.   - HEMOGLOBIN A1C  - Basic metabolic panel  (Ca, Cl, CO2, Creat, Gluc, K, Na, BUN)  - insulin glargine (LANTUS SOLOSTAR) 100 UNIT/ML pen  Dispense: 90 mL; Refill: 1  - insulin pen needle (BD AUTOSHIELD DUO) 30G X 5 MM  Dispense: 200 each; Refill: 1    Elevated serum creatinine  Renal cell carcinoma of right kidney (H)  H/O right nephrectomy  Recheck BMP. Continue care for monitoring with urology/oncology. Monitor renal function, await BMP. Advised no NSAID medications, tylenol is OK if needed for pain.   - Basic metabolic panel  (Ca, Cl, CO2, Creat, Gluc, K, Na, BUN)    Abdominal pain, generalized  Chronic issue, continues on pantoprazole, stable. Refill provided.   - refill pantoprazole (PROTONIX) 40 MG EC tablet  Dispense: 90 tablet; Refill: 1    Generalized anxiety disorder  Chronic. Stable. Refill provided  - refill venlafaxine (EFFEXOR XR) 75 MG 24 hr capsule  Dispense: 90 capsule; Refill: 1    Follow up in 3-4 months for follow up on DM, recheck fasting lipids, etc    The longitudinal plan of care for the diagnosis(es)/condition(s) as documented were addressed during this visit. Due to the added complexity in care, I will continue to support Kev in the subsequent management and with ongoing continuity of care.     Carole Hernández PA-C on 5/13/2024 at 4:07 PM       Rosario Angulo is a 48 year old, presenting for the following health issues:  Diabetes        5/13/2024     3:55 PM   Additional Questions   Roomed by Jose HARDY     History of Present Illness       CKD: He uses over the counter pain medication, including tylenol, a few times a week.    Diabetes:   He presents for follow up of diabetes.     He checks blood glucose before meals.  Blood glucose is sometimes over 200 and never under 70.  When his blood glucose is low, the patient is asymptomatic for confusion, blurred vision, lethargy and reports not feeling dizzy, shaky, or weak.                 Reason for visit:  FU of all medical conditions    He eats 2-3 servings of fruits and vegetables daily.He exercises with enough effort to increase his heart rate 9 or less minutes per day.  He exercises with enough effort to increase his heart rate 3 or less days per week.   He is taking medications regularly.   Patient wants to make sure has refills for when provider starts maternity leave.    [facility] follow up 4/3: RCC s/p right nephrectomy. Plan for repeat CT 2 months. Referred to med oncology for consideration of keytruda, had visit with oncology and going to hold off on keytruda. Plan is for monitoring   Treated for chronic epididymitis with doxycycline BID x10 days. Improving now     Blood sugars have been alright   A bit elevated last couple days - over 200 at least once, had juice prior   Diet: tends to do well right after going grocery shopping, then toward the end eats more of the hamburger, mac and cheese   Exercise - not a lot right now, going back to work next week       Review of Systems  Constitutional, HEENT, cardiovascular, pulmonary, gi and gu systems are negative, except as otherwise noted.      Objective    /85   Pulse 92   Resp 18   Ht 1.829 m (6')   Wt (!) 159.9 kg (352 lb 9.6 oz)   SpO2 97%   BMI 47.82 kg/m    Body mass index is 47.82 kg/m .  Physical Exam   GENERAL: alert and no  distress  RESP: lungs clear to auscultation - no rales, rhonchi or wheezes  CV: regular rate and rhythm, normal S1 S2, no S3 or S4, no murmur, click or rub, no peripheral edema  NEURO: Normal strength and tone, mentation intact and speech normal  PSYCH: mentation appears normal, affect normal/bright        Signed Electronically by: Carole Hernández PA-C on 5/13/2024 at 4:07 PM

## 2024-05-14 LAB
ANION GAP SERPL CALCULATED.3IONS-SCNC: 11 MMOL/L (ref 7–15)
BUN SERPL-MCNC: 19.3 MG/DL (ref 6–20)
CALCIUM SERPL-MCNC: 9 MG/DL (ref 8.6–10)
CHLORIDE SERPL-SCNC: 103 MMOL/L (ref 98–107)
CREAT SERPL-MCNC: 1.36 MG/DL (ref 0.67–1.17)
DEPRECATED HCO3 PLAS-SCNC: 24 MMOL/L (ref 22–29)
EGFRCR SERPLBLD CKD-EPI 2021: 63 ML/MIN/1.73M2
GLUCOSE SERPL-MCNC: 165 MG/DL (ref 70–99)
POTASSIUM SERPL-SCNC: 4.3 MMOL/L (ref 3.4–5.3)
SODIUM SERPL-SCNC: 138 MMOL/L (ref 135–145)

## 2024-05-14 NOTE — RESULT ENCOUNTER NOTE
"Mr. Gilbert,    CT of the abdomen and pelvis showed a few things, but nothing to explain the swelling in the right side of your scrotum.  I read Carole's note from yesterday and am seeing that maybe this is a bit better?  The scan showed fatty infiltration of your liver which is due to your obesity, Kev.  Think of this like a marbled ribeye steak....except the marbling in this case is bad.  It puts you at greater risk of liver inflammation (\"hepatitis\") as well as liver failure/cirrhosis.  Weight loss is key in reversing this and is really the most effective therapy we have for this.  It also showed gallstones (\"cholelithiasis\") which are not typically a big deal.  These are simply liquid bile in your gallbladder that have solidified into little stones.  Only a problem if they leave the gallbladder and get hung up in the duct work on the way down to your small intestine.  You'll have pain if this happens and most of the time they simply remain in the gallbladder without ever exiting.  Lastly, you have some small outpouchings in your colon/large intestine.  These are called \"diverticula\" and are very common.  We used to tell people to avoid nuts and seeds when we found someone with diverticulosis, but we no longer make this recommendation.    If you have further questions about the interpretation of your labs, [email or web address] is a good website to check out for further information.    Please contact the clinic if you have additional questions.  Thank you.    Sincerely,    Bruno Tamayo MD    "

## 2024-05-15 NOTE — RESULT ENCOUNTER NOTE
Kev,    I have reviewed your lab results below:    - kidney function with slight improvement from 3 weeks ago. Continue with good hydration and avoiding NSAID medications (ibuprofen, advil, aleve, naproxen, etc)   - A1c with minimal improvement, still above goal but heading in the right direction. I do think we need to be more aggressive in treating your diabetes as we discussed. I have sent in a prescription for jardiance as we discussed at your visit, this is to be taken once daily in the morning     I would like for you to follow up in 1 month for another lab appointment to monitor kidney function with starting the jardiance. Then, I would like to see you in September for diabetes follow up visit and recheck of A1c. Please let me know if you have any further questions.    Take care,  Carole Hernández PA-C on 5/15/2024 at 9:55 AM

## 2024-05-20 ENCOUNTER — TELEPHONE (OUTPATIENT)
Dept: FAMILY MEDICINE | Facility: CLINIC | Age: 49
End: 2024-05-20
Payer: COMMERCIAL

## 2024-05-20 NOTE — RESULT ENCOUNTER NOTE
Patient has not read CAIS message, please call and relay results/message below. Thanks!  Carole Hernández PA-C on 5/20/2024 at 10:15 AM          Kev,    I have reviewed your lab results below:    - kidney function with slight improvement from 3 weeks ago. Continue with good hydration and avoiding NSAID medications (ibuprofen, advil, aleve, naproxen, etc)  - A1c with minimal improvement, still above goal but heading in the right direction. I do think we need to be more aggressive in treating your diabetes as we discussed. I have sent in a prescription for jardiance as we discussed at your visit, this is to be taken once daily in the morning    I would like for you to follow up in 1 month for another lab appointment to monitor kidney function with starting the jardiance. Then, I would like to see you in September for diabetes follow up visit and recheck of A1c. Please let me know if you have any further questions.    Take care,  Carole Hernández PA-C on 5/15/2024 at 9:55 AM

## 2024-05-20 NOTE — TELEPHONE ENCOUNTER
----- Message from Carole Hernández PA-C sent at 5/20/2024 10:15 AM CDT -----  Patient has not read T[facility] message, please call and relay results/message below. Thanks!  Carole Hernández PA-C on 5/20/2024 at 10:15 AM          Kev,     I have reviewed your lab results below:     - kidney function with slight improvement from 3 weeks ago. Continue with good hydration and avoiding NSAID medications (ibuprofen, advil, aleve, naproxen, etc)  - A1c with minimal improvement, still above goal but heading in the right direction. I do think we need to be more aggressive in treating your diabetes as we discussed. I have sent in a prescription for jardiance as we discussed at your visit, this is to be taken once daily in the morning     I would like for you to follow up in 1 month for another lab appointment to monitor kidney function with starting the jardiance. Then, I would like to see you in September for diabetes follow up visit and recheck of A1c. Please let me know if you have any further questions.     Take care,  Carole Hernández PA-C on 5/15/2024 at 9:55 AM

## 2024-05-20 NOTE — TELEPHONE ENCOUNTER
Triage Patient Outreach    Attempt # 1    Was call answered?  No.  Unable to leave message, recall needed.    Yamileth Nielson RN

## 2024-05-21 NOTE — TELEPHONE ENCOUNTER
Spoke to patient and relayed provider message. Patient verbalized understanding of the message. No questions at this time.    Tatiana Vasquez RN

## 2024-05-30 ENCOUNTER — VIRTUAL VISIT (OUTPATIENT)
Dept: EDUCATION SERVICES | Facility: CLINIC | Age: 49
End: 2024-05-30
Payer: COMMERCIAL

## 2024-05-30 DIAGNOSIS — E11.65 TYPE 2 DIABETES MELLITUS WITH HYPERGLYCEMIA, WITH LONG-TERM CURRENT USE OF INSULIN (H): Primary | ICD-10-CM

## 2024-05-30 DIAGNOSIS — Z79.4 TYPE 2 DIABETES MELLITUS WITH HYPERGLYCEMIA, WITH LONG-TERM CURRENT USE OF INSULIN (H): Primary | ICD-10-CM

## 2024-05-30 PROCEDURE — 98967 PH1 ASSMT&MGMT NQHP 11-20: CPT | Mod: 93 | Performed by: DIETITIAN, REGISTERED

## 2024-05-30 NOTE — PATIENT INSTRUCTIONS
Goals for Diabetes Care:    1. Eat 3 balanced meals each day - Monitor carb intake and aim for 45-60 grams per meal  This would be equal to about 4 choices of carbohydrates. Carbohydrate 1 choice = 15 grams  Aim to eat the plate method style - half your plate fruits/veggies, 1/4 the plate protein and 1/4 plate starch (rice, potato, pasta)    2. Check blood sugars at least 3 times each day at varying times   Blood Glucose Targets:   1. Fasting and before meal target is 80 - 130   2. 2 hours after a meal target is < 180  Always remember to bring meter and log book to all appointments.  Start Nara 3 continuous glucose monitor!!! This will help you manage your glucose much easier.    3. Activity really helps improve blood sugars. Try to Incorporate 30 minutes activity into each day - does not need to be all at one time & walking counts!    4. Treating low BG. Rule of 15 = BG 50-70 mg/dL = 15 gram carb (4 oz juice, 4 glucose tabs, OR 4 oz pop), then recheck BG in 15 minutes. If still low repeat. (If BG <50 mg/dL = 8 oz pop/juice or 8 glucose tabs).    5. Take diabetes medications as prescribed   -no changes  -Lantus 42 units twice daily    Goals  Check glucose 3 times daily with Nara 3  Take insulin daily as prescribed 100% of the time  Follow up with your Diabetic Educator to assess BG targets and need for modifications to medications and/or lifestyle.    Call with any questions.  Thank you!  Bell Pratt RDN, LD, CDCES   Certified Diabetes Care &   [facility]  Triage [phone]

## 2024-05-30 NOTE — LETTER
5/30/2024         RE: Kev Gilbert  [address] [facility] [address]        Dear Colleague,    Thank you for referring your patient, Kev Gilbert, to the [facility]. Please see a copy of my visit note below.    Diabetes Self-Management Education & Support    Presents for: Individual review    Type of Service: Telephone Visit    Originating Location (Patient Location): Home  Distant Location (Provider Location): Offsite  Mode of Communication:  Telephone    Telephone Visit Start Time:  9:58 AM  Telephone Visit End Time (telephone visit stop time): 10:16 AM    How would patient like to obtain AVS? MyChart      ASSESSMENT:  Met with Kev today to review his insulin use and medications along with diabetes control. Kev read side effects of the Jardiance and did not feel comfortable starting since he has a history of necrotizing fasciitis. Pt reports seeing improved glucose numbers when he checks glucose due to eating healthier - patient has not started the Nara 3 CGM, but reports having it. Emphasized the importance of starting this.  Pt reports glucose around 140 mg/dL waking and sometimes as low as 107 mg/dL. Pt states he checked glucose after eating cake one day and it was 180 mg/dL.  Reviewed importance of improved glucose and also discussed importance of working on quitting smoking.      Patient's most recent   Lab Results   Component Value Date    A1C 9.7 05/13/2024     is not meeting goal of <7.0    Diabetes knowledge and skills assessment:   Patient is knowledgeable in diabetes management concepts related to: Healthy Eating,  Taking Medication    Continue education with the following diabetes management concepts: Healthy Eating, Being Active, Monitoring, Taking Medication, Problem Solving, Reducing Risks, and Healthy Coping    Based on learning assessment above, most appropriate setting for further diabetes education would be: Individual setting.      PLAN  Continue Lantus 42  units twice daily  Start Nara 3 CGM monitor  Continue with healthier meal options  Schedule eye and dental exams    Topics to cover at upcoming visits: Healthy Eating, Being Active, Monitoring, Taking Medication, Problem Solving, Reducing Risks, and Healthy Coping    Follow-up: 4-6 weeks    See Care Plan for co-developed, patient-state behavior change goals.  AVS provided for patient today.    Education Materials Provided:  No new materials provided today      SUBJECTIVE/OBJECTIVE:  Presents for: Individual review  Accompanied by: Self  Diabetes education in the past 24mo: Yes  Focus of Visit: Monitoring, Taking Medication  Diabetes type: Type 2  Date of diagnosis: 2023  Disease course: Improving  How confident are you filling out medical forms by yourself:: Quite a bit  Diabetes management related comments/concerns: not taking Jardiance  Transportation concerns: No  Difficulty affording diabetes medication?: No  Difficulty affording diabetes testing supplies?: No  Other concerns:: None  Cultural Influences/Ethnic Background:  Not  or     Diabetes Symptoms & Complications:  Diabetes Related Symptoms: Polydipsia (increased thirst), Slow healing wounds, Visual change  Weight trend: Stable  Symptom course: Worsening  Disease course: Improving  Complications assessed today?: Yes  Autonomic neuropathy: No  CVA: No  Heart disease: No  Nephropathy: Yes  Peripheral neuropathy: No  Peripheral Vascular Disease: No  Retinopathy: No  Sexual dysfunction: No    Patient Problem List and Family Medical History reviewed for relevant medical history, current medical status, and diabetes risk factors.    Vitals:  There were no vitals taken for this visit.  Estimated body mass index is 47.82 kg/m  as calculated from the following:    Height as of 5/13/24: 1.829 m (6').    Weight as of 5/13/24: 159.9 kg (352 lb 9.6 oz).   Last 3 BP:   BP Readings from Last 3 Encounters:   05/13/24 128/85   04/24/24 136/88   03/20/24  "130/85       History   Smoking Status     Every Day     Packs/day: 1.50     Types: Cigarettes     Start date: 1/1/1985   Smokeless Tobacco     Never       Labs:  Lab Results   Component Value Date    A1C 9.7 05/13/2024     Lab Results   Component Value Date     05/13/2024    GLC 93 12/06/2021    GLC 98 09/06/2019     Lab Results   Component Value Date    LDL  04/24/2024      Comment:      Cannot estimate LDL when triglyceride exceeds 400 mg/dL     04/24/2024     12/06/2021     09/06/2019     HDL Cholesterol   Date Value Ref Range Status   09/06/2019 38 (L) >39 mg/dL Final     Direct Measure HDL   Date Value Ref Range Status   04/24/2024 27 (L) >=40 mg/dL Final   ]  GFR Estimate   Date Value Ref Range Status   05/13/2024 63 >60 mL/min/1.73m2 Final   09/06/2019 >90 >60 mL/min/[1.73_m2] Final     Comment:     Non  GFR Calc  Starting 12/18/2018, serum creatinine based estimated GFR (eGFR) will be   calculated using the Chronic Kidney Disease Epidemiology Collaboration   (CKD-EPI) equation.       GFR Estimate If Black   Date Value Ref Range Status   09/06/2019 >90 >60 mL/min/[1.73_m2] Final     Comment:      GFR Calc  Starting 12/18/2018, serum creatinine based estimated GFR (eGFR) will be   calculated using the Chronic Kidney Disease Epidemiology Collaboration   (CKD-EPI) equation.       Lab Results   Component Value Date    CR 1.36 05/13/2024    CR 0.85 09/06/2019     No results found for: \"MICROALBUMIN\"    Healthy Eating:  Healthy Eating Assessed Today: Yes  Cultural/Scientologist diet restrictions?: No  Meal planning/habits: None  Who cooks/prepares meals for you?: Self  Who purchases food in  your home?: Self  Meals include: Breakfast, Lunch, Dinner, Evening Snack  Breakfast: villarreal, eggs, 1 ww toast, 1/2 cup pineapple OR cereal  Lunch: typically goes 8 hours without eating  Dinner: meat (chicken/beef), potato/rice, veggie (broccoli, cauli, carrots) (not eating " as much salad as he used to) OR tacos - trying to get more vegetables  Snacks: nuts - peanuts OR fresh fruit  Other: Sometimes has cranberry juice  Has been having more water or arnold greenberg - light - trying to have it less often  Beverages: Water, Juice  Has patient met with a dietitian in the past?: Yes    Being Active:  Being Active Assessed Today: No  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 : Yes  Blood Glucose Meter: Accu-chek  Times checking blood sugar at home (number): 1  Times checking blood sugar at home (per): Day  Blood glucose trend: Decreasing      Taking Medications:  Diabetes Medication(s)       Insulin       insulin glargine (LANTUS SOLOSTAR) 100 UNIT/ML pen ADMINISTER 45 UNITS UNDER THE SKIN TWICE DAILY (total daily dose 90 units/day)       Sodium-Glucose Co-Transporter 2 (SGLT2) Inhibitors       empagliflozin (JARDIANCE) 10 MG TABS tablet Take 1 tablet (10 mg) by mouth daily     Patient not taking: Reported on 5/30/2024            Taking Medication Assessed Today: Yes  Current Treatments: Insulin Injections  Dose schedule: Pre-breakfast, At bedtime  Given by: Patient  Injection/Infusion sites: Abdomen  Problems taking diabetes medications regularly?: No  Diabetes medication side effects?: No    Problem Solving:  Problem Solving Assessed Today: Yes  Is the patient at risk for hypoglycemia?: Yes  Hypoglycemia Frequency: Never  Is the patient at risk for DKA?: No              Reducing Risks:  Reducing Risks Assessed Today: No  Diabetes Risks: Age over 45 years, Sedentary Lifestyle  CAD Risks: Diabetes Mellitus, Hypertension, Male sex, Obesity, Tobacco exposure  Has dilated eye exam at least once a year?: No  Sees dentist every 6 months?: No  Feet checked by healthcare provider in the last year?: No    Healthy Coping:  Healthy Coping  Assessed Today: Yes  Emotional response to diabetes: Ready to learn  Stage of change: ACTION (Actively working towards change)  Support resources: None  Patient Activation Measure Survey Score:      12/6/2012     9:00 AM   SAMAN Score (Last Two)   SAMAN Raw Score 39   Activation Score 56.4   SAMAN Level 3         Care Plan and Education Provided:  Healthy Eating: Portion control, Being Active: Finding a physical activity routine that works for you, Monitoring: Blood glucose versus Continuous Glucose Monitoring, Taking Medication: Action of prescribed medication(s) and When to take medication(s), Problem Solving: Low glucose - causes, signs/symptoms, treatment and prevention, Reducing Risks: Dental care, Eye care, and Goal for A1c, how it relates to glucose and how often to check, and Healthy Coping: Benefits of making appropriate lifestyle changes      Time Spent: 18 minutes  Encounter Type: Individual    Any diabetes medication dose changes were made via the CDE Protocol per the patient's referring provider. A copy of this encounter was shared with the provider.

## 2024-05-30 NOTE — PROGRESS NOTES
Diabetes Self-Management Education & Support    Presents for: Individual review    Type of Service: Telephone Visit    Originating Location (Patient Location): Home  Distant Location (Provider Location): Offsite  Mode of Communication:  Telephone    Telephone Visit Start Time:  9:58 AM  Telephone Visit End Time (telephone visit stop time): 10:16 AM    How would patient like to obtain AVS? Latosha      ASSESSMENT:  Met with Kev today to review his insulin use and medications along with diabetes control. Kev read side effects of the Jardiance and did not feel comfortable starting since he has a history of necrotizing fasciitis. Pt reports seeing improved glucose numbers when he checks glucose due to eating healthier - patient has not started the Nara 3 CGM, but reports having it. Emphasized the importance of starting this.  Pt reports glucose around 140 mg/dL waking and sometimes as low as 107 mg/dL. Pt states he checked glucose after eating cake one day and it was 180 mg/dL.  Reviewed importance of improved glucose and also discussed importance of working on quitting smoking.      Patient's most recent   Lab Results   Component Value Date    A1C 9.7 05/13/2024     is not meeting goal of <7.0    Diabetes knowledge and skills assessment:   Patient is knowledgeable in diabetes management concepts related to: Healthy Eating,  Taking Medication    Continue education with the following diabetes management concepts: Healthy Eating, Being Active, Monitoring, Taking Medication, Problem Solving, Reducing Risks, and Healthy Coping    Based on learning assessment above, most appropriate setting for further diabetes education would be: Individual setting.      PLAN  Continue Lantus 42 units twice daily  Start Nara 3 CGM monitor  Continue with healthier meal options  Schedule eye and dental exams    Topics to cover at upcoming visits: Healthy Eating, Being Active, Monitoring, Taking Medication, Problem Solving, Reducing  Risks, and Healthy Coping    Follow-up: 4-6 weeks    See Care Plan for co-developed, patient-state behavior change goals.  AVS provided for patient today.    Education Materials Provided:  No new materials provided today      SUBJECTIVE/OBJECTIVE:  Presents for: Individual review  Accompanied by: Self  Diabetes education in the past 24mo: Yes  Focus of Visit: Monitoring, Taking Medication  Diabetes type: Type 2  Date of diagnosis: 2023  Disease course: Improving  How confident are you filling out medical forms by yourself:: Quite a bit  Diabetes management related comments/concerns: not taking Jardiance  Transportation concerns: No  Difficulty affording diabetes medication?: No  Difficulty affording diabetes testing supplies?: No  Other concerns:: None  Cultural Influences/Ethnic Background:  Not  or     Diabetes Symptoms & Complications:  Diabetes Related Symptoms: Polydipsia (increased thirst), Slow healing wounds, Visual change  Weight trend: Stable  Symptom course: Worsening  Disease course: Improving  Complications assessed today?: Yes  Autonomic neuropathy: No  CVA: No  Heart disease: No  Nephropathy: Yes  Peripheral neuropathy: No  Peripheral Vascular Disease: No  Retinopathy: No  Sexual dysfunction: No    Patient Problem List and Family Medical History reviewed for relevant medical history, current medical status, and diabetes risk factors.    Vitals:  There were no vitals taken for this visit.  Estimated body mass index is 47.82 kg/m  as calculated from the following:    Height as of 5/13/24: 1.829 m (6').    Weight as of 5/13/24: 159.9 kg (352 lb 9.6 oz).   Last 3 BP:   BP Readings from Last 3 Encounters:   05/13/24 128/85   04/24/24 136/88   03/20/24 130/85       History   Smoking Status    Every Day    Packs/day: 1.50    Types: Cigarettes    Start date: 1/1/1985   Smokeless Tobacco    Never       Labs:  Lab Results   Component Value Date    A1C 9.7 05/13/2024     Lab Results   Component  "Value Date     05/13/2024    GLC 93 12/06/2021    GLC 98 09/06/2019     Lab Results   Component Value Date    LDL  04/24/2024      Comment:      Cannot estimate LDL when triglyceride exceeds 400 mg/dL     04/24/2024     12/06/2021     09/06/2019     HDL Cholesterol   Date Value Ref Range Status   09/06/2019 38 (L) >39 mg/dL Final     Direct Measure HDL   Date Value Ref Range Status   04/24/2024 27 (L) >=40 mg/dL Final   ]  GFR Estimate   Date Value Ref Range Status   05/13/2024 63 >60 mL/min/1.73m2 Final   09/06/2019 >90 >60 mL/min/[1.73_m2] Final     Comment:     Non  GFR Calc  Starting 12/18/2018, serum creatinine based estimated GFR (eGFR) will be   calculated using the Chronic Kidney Disease Epidemiology Collaboration   (CKD-EPI) equation.       GFR Estimate If Black   Date Value Ref Range Status   09/06/2019 >90 >60 mL/min/[1.73_m2] Final     Comment:      GFR Calc  Starting 12/18/2018, serum creatinine based estimated GFR (eGFR) will be   calculated using the Chronic Kidney Disease Epidemiology Collaboration   (CKD-EPI) equation.       Lab Results   Component Value Date    CR 1.36 05/13/2024    CR 0.85 09/06/2019     No results found for: \"MICROALBUMIN\"    Healthy Eating:  Healthy Eating Assessed Today: Yes  Cultural/Buddhist diet restrictions?: No  Meal planning/habits: None  Who cooks/prepares meals for you?: Self  Who purchases food in  your home?: Self  Meals include: Breakfast, Lunch, Dinner, Evening Snack  Breakfast: villarreal, eggs, 1 ww toast, 1/2 cup pineapple OR cereal  Lunch: typically goes 8 hours without eating  Dinner: meat (chicken/beef), potato/rice, veggie (broccoli, cauli, carrots) (not eating as much salad as he used to) OR tacos - trying to get more vegetables  Snacks: nuts - peanuts OR fresh fruit  Other: Sometimes has cranberry juice  Has been having more water or arnold greenberg - light - trying to have it less often  Beverages: " Water, Juice  Has patient met with a dietitian in the past?: Yes    Being Active:  Being Active Assessed Today: No  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 : Yes  Blood Glucose Meter: Accu-chek  Times checking blood sugar at home (number): 1  Times checking blood sugar at home (per): Day  Blood glucose trend: Decreasing      Taking Medications:  Diabetes Medication(s)       Insulin       insulin glargine (LANTUS SOLOSTAR) 100 UNIT/ML pen ADMINISTER 45 UNITS UNDER THE SKIN TWICE DAILY (total daily dose 90 units/day)       Sodium-Glucose Co-Transporter 2 (SGLT2) Inhibitors       empagliflozin (JARDIANCE) 10 MG TABS tablet Take 1 tablet (10 mg) by mouth daily     Patient not taking: Reported on 5/30/2024            Taking Medication Assessed Today: Yes  Current Treatments: Insulin Injections  Dose schedule: Pre-breakfast, At bedtime  Given by: Patient  Injection/Infusion sites: Abdomen  Problems taking diabetes medications regularly?: No  Diabetes medication side effects?: No    Problem Solving:  Problem Solving Assessed Today: Yes  Is the patient at risk for hypoglycemia?: Yes  Hypoglycemia Frequency: Never  Is the patient at risk for DKA?: No              Reducing Risks:  Reducing Risks Assessed Today: No  Diabetes Risks: Age over 45 years, Sedentary Lifestyle  CAD Risks: Diabetes Mellitus, Hypertension, Male sex, Obesity, Tobacco exposure  Has dilated eye exam at least once a year?: No  Sees dentist every 6 months?: No  Feet checked by healthcare provider in the last year?: No    Healthy Coping:  Healthy Coping Assessed Today: Yes  Emotional response to diabetes: Ready to learn  Stage of change: ACTION (Actively working towards change)  Support resources: None  Patient Activation Measure Survey Score:      12/6/2012     9:00 AM   SAMAN Score (Last Two)    SAMAN Raw Score 39   Activation Score 56.4   SAMAN Level 3         Care Plan and Education Provided:  Healthy Eating: Portion control, Being Active: Finding a physical activity routine that works for you, Monitoring: Blood glucose versus Continuous Glucose Monitoring, Taking Medication: Action of prescribed medication(s) and When to take medication(s), Problem Solving: Low glucose - causes, signs/symptoms, treatment and prevention, Reducing Risks: Dental care, Eye care, and Goal for A1c, how it relates to glucose and how often to check, and Healthy Coping: Benefits of making appropriate lifestyle changes      Time Spent: 18 minutes  Encounter Type: Individual    Any diabetes medication dose changes were made via the CDE Protocol per the patient's referring provider. A copy of this encounter was shared with the provider.

## 2024-06-22 ENCOUNTER — HEALTH MAINTENANCE LETTER (OUTPATIENT)
Age: 49
End: 2024-06-22

## 2024-08-31 ENCOUNTER — HEALTH MAINTENANCE LETTER (OUTPATIENT)
Age: 49
End: 2024-08-31

## 2024-09-11 ENCOUNTER — TRANSFERRED RECORDS (OUTPATIENT)
Dept: HEALTH INFORMATION MANAGEMENT | Facility: CLINIC | Age: 49
End: 2024-09-11

## 2024-10-28 ENCOUNTER — TELEPHONE (OUTPATIENT)
Dept: FAMILY MEDICINE | Facility: CLINIC | Age: 49
End: 2024-10-28

## 2024-10-28 NOTE — TELEPHONE ENCOUNTER
Patient cancelled appointment with Dominique Turner PA-C today and is rescheduled to see me in January. Would not recommend waiting that long to be seen. Please schedule pt with me in any open slot in the next couple weeks  Carole Hernández PA-C on 10/28/2024 at 12:52 PM

## 2024-10-28 NOTE — TELEPHONE ENCOUNTER
Reason for Call:  Appointment Request    Patient requesting this type of appt:  Diabetes check     Requested provider: Carole Hernández    Reason patient unable to be scheduled:  PT had an appt today with Dominique. PT was thinking that it was with Carole. PT will only see Carole. Made an appt with Carole for Jan 2025.   PT would really like to have his appt for diabetes check before that with Carole. Any way that we can help with this issues?     When does patient want to be seen/preferred time:  ASAP     Comments: Pt was upset that his appt was not with Carole.   PT states that he is behind with see Carole for this issue & needs to see her as soon as possible.     Could we send this information to you in [employer] or would you prefer to receive a phone call?:   Patient would prefer a phone call   Okay to leave a detailed message?: Yes at Cell number on file:    Telephone Information:   Mobile [phone]       Call taken on 10/28/2024 at 12:49 PM by Cristin Cheung

## 2024-11-01 DIAGNOSIS — E11.65 TYPE 2 DIABETES MELLITUS WITH HYPERGLYCEMIA, WITH LONG-TERM CURRENT USE OF INSULIN (H): ICD-10-CM

## 2024-11-01 DIAGNOSIS — Z79.4 TYPE 2 DIABETES MELLITUS WITH HYPERGLYCEMIA, WITH LONG-TERM CURRENT USE OF INSULIN (H): ICD-10-CM

## 2024-11-01 RX ORDER — INSULIN GLARGINE 100 [IU]/ML
INJECTION, SOLUTION SUBCUTANEOUS
Qty: 90 ML | Refills: 0 | Status: SHIPPED | OUTPATIENT
Start: 2024-11-01

## 2024-11-04 NOTE — TELEPHONE ENCOUNTER
Called to schedule appt.  VM not set up yet.  Schedule with Carole in open slot in next couple weeks.    No Peraza on 11/4/2024 at 8:57 AM

## 2024-11-26 ENCOUNTER — TELEPHONE (OUTPATIENT)
Dept: FAMILY MEDICINE | Facility: CLINIC | Age: 49
End: 2024-11-26
Payer: COMMERCIAL

## 2024-11-26 NOTE — TELEPHONE ENCOUNTER
Patient Quality Outreach    Patient is due for the following:   Diabetes -  A1C, Eye Exam, Diabetic Follow-Up Visit, and Foot Exam  Physical Preventive Adult Physical      Topic Date Due    Pneumococcal Vaccine (1 of 2 - PCV) Never done    Zoster (Shingles) Vaccine (1 of 2) Never done    Flu Vaccine (1) Never done    COVID-19 Vaccine (1 - 2024-25 season) Never done       Action(s) Taken:   Schedule a Adult Preventative    Type of outreach:    Called pt, he is scheduled for January but provider would like him seen sooner.  Contacted pt and appt has been scheduled for 12/2    Questions for provider review:    None           Serenity Mcguire, CMA

## 2024-12-03 DIAGNOSIS — F41.1 GENERALIZED ANXIETY DISORDER: ICD-10-CM

## 2024-12-03 DIAGNOSIS — R10.84 ABDOMINAL PAIN, GENERALIZED: ICD-10-CM

## 2024-12-03 RX ORDER — VENLAFAXINE HYDROCHLORIDE 75 MG/1
CAPSULE, EXTENDED RELEASE ORAL
Qty: 90 CAPSULE | Refills: 0 | Status: SHIPPED | OUTPATIENT
Start: 2024-12-03

## 2024-12-03 RX ORDER — PANTOPRAZOLE SODIUM 40 MG/1
40 TABLET, DELAYED RELEASE ORAL DAILY
Qty: 90 TABLET | Refills: 0 | Status: SHIPPED | OUTPATIENT
Start: 2024-12-03

## 2024-12-29 DIAGNOSIS — E11.65 TYPE 2 DIABETES MELLITUS WITH HYPERGLYCEMIA, WITH LONG-TERM CURRENT USE OF INSULIN (H): ICD-10-CM

## 2024-12-29 DIAGNOSIS — Z79.4 TYPE 2 DIABETES MELLITUS WITH HYPERGLYCEMIA, WITH LONG-TERM CURRENT USE OF INSULIN (H): ICD-10-CM

## 2024-12-30 RX ORDER — PEN NEEDLE, DIABETIC, SAFETY 30 GX3/16"
NEEDLE, DISPOSABLE MISCELLANEOUS
Qty: 200 EACH | Refills: 0 | Status: SHIPPED | OUTPATIENT
Start: 2024-12-30

## 2025-01-04 ENCOUNTER — HEALTH MAINTENANCE LETTER (OUTPATIENT)
Age: 50
End: 2025-01-04

## 2025-01-06 ENCOUNTER — OFFICE VISIT (OUTPATIENT)
Dept: FAMILY MEDICINE | Facility: CLINIC | Age: 50
End: 2025-01-06
Payer: COMMERCIAL

## 2025-01-06 VITALS
HEIGHT: 73 IN | SYSTOLIC BLOOD PRESSURE: 131 MMHG | RESPIRATION RATE: 24 BRPM | OXYGEN SATURATION: 98 % | DIASTOLIC BLOOD PRESSURE: 85 MMHG | TEMPERATURE: 97.3 F | WEIGHT: 315 LBS | BODY MASS INDEX: 41.75 KG/M2 | HEART RATE: 96 BPM

## 2025-01-06 DIAGNOSIS — E11.65 TYPE 2 DIABETES MELLITUS WITH HYPERGLYCEMIA, WITH LONG-TERM CURRENT USE OF INSULIN (H): Primary | ICD-10-CM

## 2025-01-06 DIAGNOSIS — N50.89 SCROTAL SWELLING: ICD-10-CM

## 2025-01-06 DIAGNOSIS — F41.1 GENERALIZED ANXIETY DISORDER: ICD-10-CM

## 2025-01-06 DIAGNOSIS — Z79.4 TYPE 2 DIABETES MELLITUS WITH HYPERGLYCEMIA, WITH LONG-TERM CURRENT USE OF INSULIN (H): Primary | ICD-10-CM

## 2025-01-06 DIAGNOSIS — Z85.528 HISTORY OF PRIMARY MALIGNANT NEOPLASM OF RIGHT KIDNEY: ICD-10-CM

## 2025-01-06 DIAGNOSIS — R10.84 ABDOMINAL PAIN, GENERALIZED: ICD-10-CM

## 2025-01-06 DIAGNOSIS — L02.92 BOILS: ICD-10-CM

## 2025-01-06 LAB
ANION GAP SERPL CALCULATED.3IONS-SCNC: 12 MMOL/L (ref 7–15)
BUN SERPL-MCNC: 15.1 MG/DL (ref 6–20)
CALCIUM SERPL-MCNC: 9.1 MG/DL (ref 8.8–10.4)
CHLORIDE SERPL-SCNC: 101 MMOL/L (ref 98–107)
CREAT SERPL-MCNC: 1.37 MG/DL (ref 0.67–1.17)
EGFRCR SERPLBLD CKD-EPI 2021: 62 ML/MIN/1.73M2
EST. AVERAGE GLUCOSE BLD GHB EST-MCNC: 243 MG/DL
GLUCOSE SERPL-MCNC: 178 MG/DL (ref 70–99)
HBA1C MFR BLD: 10.1 % (ref 0–5.6)
HCO3 SERPL-SCNC: 24 MMOL/L (ref 22–29)
POTASSIUM SERPL-SCNC: 4.1 MMOL/L (ref 3.4–5.3)
SODIUM SERPL-SCNC: 137 MMOL/L (ref 135–145)

## 2025-01-06 PROCEDURE — 99214 OFFICE O/P EST MOD 30 MIN: CPT | Performed by: PHYSICIAN ASSISTANT

## 2025-01-06 PROCEDURE — 80048 BASIC METABOLIC PNL TOTAL CA: CPT | Performed by: PHYSICIAN ASSISTANT

## 2025-01-06 PROCEDURE — 83036 HEMOGLOBIN GLYCOSYLATED A1C: CPT | Performed by: PHYSICIAN ASSISTANT

## 2025-01-06 PROCEDURE — G2211 COMPLEX E/M VISIT ADD ON: HCPCS | Performed by: PHYSICIAN ASSISTANT

## 2025-01-06 PROCEDURE — 36415 COLL VENOUS BLD VENIPUNCTURE: CPT | Performed by: PHYSICIAN ASSISTANT

## 2025-01-06 RX ORDER — INSULIN GLARGINE 100 [IU]/ML
INJECTION, SOLUTION SUBCUTANEOUS
Qty: 90 ML | Refills: 3 | Status: SHIPPED | OUTPATIENT
Start: 2025-01-06

## 2025-01-06 RX ORDER — LANCETS
EACH MISCELLANEOUS
Qty: 300 EACH | Refills: 3 | Status: SHIPPED | OUTPATIENT
Start: 2025-01-06

## 2025-01-06 RX ORDER — DOXYCYCLINE 100 MG/1
100 CAPSULE ORAL 2 TIMES DAILY
Qty: 20 CAPSULE | Refills: 0 | Status: SHIPPED | OUTPATIENT
Start: 2025-01-06 | End: 2025-01-16

## 2025-01-06 RX ORDER — PANTOPRAZOLE SODIUM 40 MG/1
40 TABLET, DELAYED RELEASE ORAL DAILY
Qty: 90 TABLET | Refills: 3 | Status: SHIPPED | OUTPATIENT
Start: 2025-01-06

## 2025-01-06 RX ORDER — SENNOSIDES 8.6 MG
TABLET ORAL
COMMUNITY
Start: 2024-03-14

## 2025-01-06 RX ORDER — VENLAFAXINE HYDROCHLORIDE 75 MG/1
CAPSULE, EXTENDED RELEASE ORAL
Qty: 90 CAPSULE | Refills: 3 | Status: SHIPPED | OUTPATIENT
Start: 2025-01-06

## 2025-01-06 RX ORDER — CHLORHEXIDINE GLUCONATE 40 MG/ML
SOLUTION TOPICAL DAILY PRN
Qty: 532 ML | Refills: 11 | Status: SHIPPED | OUTPATIENT
Start: 2025-01-06

## 2025-01-06 RX ORDER — PEN NEEDLE, DIABETIC, SAFETY 30 GX3/16"
NEEDLE, DISPOSABLE MISCELLANEOUS
Qty: 200 EACH | Refills: 3 | Status: SHIPPED | OUTPATIENT
Start: 2025-01-06

## 2025-01-06 ASSESSMENT — PAIN SCALES - GENERAL: PAINLEVEL_OUTOF10: MILD PAIN (3)

## 2025-01-06 NOTE — PROGRESS NOTES
Assessment & Plan     Type 2 diabetes mellitus with hyperglycemia, with long-term current use of insulin (H)  Advised to schedule eye exam - he will find location near his home within insurance network.   Fasting glucoses in 130s, on lantus only - has not tolerated or has refused other agents. Await results and adjust therapy as needed.  Declines vaccines  Declines statin  - HEMOGLOBIN A1C  - Basic metabolic panel  (Ca, Cl, CO2, Creat, Gluc, K, Na, BUN)  - insulin glargine (LANTUS SOLOSTAR) 100 UNIT/ML pen  Dispense: 90 mL; Refill: 3  - insulin pen needle (BD AUTOSHIELD DUO) 30G X 5 MM  Dispense: 200 each; Refill: 3  - blood glucose monitoring (SOFTCLIX) lancets  Dispense: 300 each; Refill: 3  - blood glucose (NO BRAND SPECIFIED) test strip  Dispense: 300 strip; Refill: 3    Body mass index (BMI) 45.0-49.9, adult (H)  Noted     History of primary malignant neoplasm of right kidney  S/P right nephrectomy 3/12/24. Follows with [facility] - [facility] signed today. Recent CT in 9-2024 with no evidence of disease.   Continue close follow up with oncology.    Boils  Recurrent issue. Hibiclens was helping to keep boils from forming but notes he did not shower for a few days prior to current boil forming. Has responded well to doxy in the past. Not amendable to I&D today, continue warm compresses for now.   - chlorhexidine (HIBICLENS) 4 % solution  Dispense: 532 mL; Refill: 11  - doxycycline hyclate (VIBRAMYCIN) 100 MG capsule  Dispense: 20 capsule; Refill: 0    Scrotal swelling - right  Declines exam. Stable x1 year, not bothersome to him. US in 4-2024 showed right hydrocele. Discussed considering referral back to urology if becoming bothersome, he declines for now.     Generalized anxiety disorder  Stable.   - refill venlafaxine (EFFEXOR XR) 75 MG 24 hr capsule  Dispense: 90 capsule; Refill: 3    Abdominal pain, generalized  Stable - symptoms managed with protonix.  - refill pantoprazole (PROTONIX) 40 MG EC tablet  Dispense:  "90 tablet; Refill: 3      Nicotine/Tobacco Cessation  He reports that he has been smoking cigarettes. He started smoking about 40 years ago. He has a 60 pack-year smoking history. He has never used smokeless tobacco.  Nicotine/Tobacco Cessation Plan  Self help information given to patient. States he has cut back.      BMI  Estimated body mass index is 46.78 kg/m  as calculated from the following:    Height as of this encounter: 1.85 m (6' 0.84\").    Weight as of this encounter: 160.1 kg (353 lb).   Weight management plan: Discussed healthy diet and exercise guidelines    The longitudinal plan of care for the diagnosis(es)/condition(s) as documented were addressed during this visit. Due to the added complexity in care, I will continue to support Kev in the subsequent management and with ongoing continuity of care.     Carole Hernández PA-C on 1/6/2025 at 7:30 AM      Subjective   Kev is a 49 year old, presenting for the following health issues:  Medication Follow-up (Diabetes. ), Testicular Problem (Following up, testicle swelling still present, stable. ), and Cyst (Left leg, 3 days, no pus, or discharge. )        1/6/2025     7:00 AM   Additional Questions   Roomed by Swapna BABCOCK     History of Present Illness       Diabetes:   He presents for follow up of diabetes.  He is checking home blood glucose one time daily.   He checks blood glucose before meals.  Blood glucose is sometimes over 200 and never under 70.  When his blood glucose is low, the patient is asymptomatic for confusion, blurred vision, lethargy and reports not feeling dizzy, shaky, or weak.  He is concerned about frequent infections.    He is not experiencing numbness or burning in feet, excessive thirst, blurry vision, weight changes or redness, sores or blisters on feet. The patient has not had a diabetic eye exam in the last 12 months.          Reason for visit:  Check up    He eats 2-3 servings of fruits and vegetables daily.He consumes 2 " "sweetened beverage(s) daily.He exercises with enough effort to increase his heart rate 9 or less minutes per day.  He exercises with enough effort to increase his heart rate 3 or less days per week.   He is taking medications regularly.       Broil Lesion  Onset/Duration: 3 days  Description  Location: Left upper leg near groin  Color: can't see it  Border description: raised, inflamed  Character: round  Itching: no  Bleeding:  No  Intensity:  moderate  Progression of Symptoms:  worsening  Accompanying signs and symptoms:   Bleeding: No  Scaling: No  Excessive sun exposure/tanning: No  Sunscreen used: No  History:           Any previous history of skin cancer: No  Any family history of melanoma: No  Previous episodes of similar mass: YES  Precipitating or alleviating factors: Rubbing  Therapies tried and outcome: none    Diabetes  More junk the last week with holidays but generally has had glucoses in the 130 fasting   Lantus 45u BID   Did not start jardiance due to history of necrotizing fasciitis     RCC s/p right nephrectomy 3/12/24  [facility]  CT 9/2024 showed no evidence of recurrence or mets. Did note fatty liver    Testicular problem   Continues to have stable enlargement right testicle since March 2024           Objective    /85   Pulse 96   Temp 97.3  F (36.3  C) (Temporal)   Resp 24   Ht 1.85 m (6' 0.84\")   Wt (!) 160.1 kg (353 lb)   SpO2 98%   BMI 46.78 kg/m    Body mass index is 46.78 kg/m .  Physical Exam   GENERAL: alert and no distress  RESP: lungs clear to auscultation - no rales, rhonchi or wheezes  CV: regular rate and rhythm, normal S1 S2, no S3 or S4, no murmur, click or rub, no peripheral edema  SKIN: Boil left inner thigh, surrounding erythema and tenderness, no fluctuance currently. No drainage.  NEURO: Normal strength and tone, mentation intact and speech normal  PSYCH: mentation appears normal, affect normal/bright        Signed Electronically by: " Carole Hernández PA-C on 1/6/2025 at 7:28 AM

## 2025-01-08 ENCOUNTER — TELEPHONE (OUTPATIENT)
Dept: FAMILY MEDICINE | Facility: CLINIC | Age: 50
End: 2025-01-08

## 2025-01-08 NOTE — RESULT ENCOUNTER NOTE
Please call patient - he does not check MyChart.     - A1c has increased back to 10.1. I would like for him to work on diet and exercise, start checking his blood sugars 3 times daily (fasting in AM, 2 hours after main meal and at bedtime) and follow up with diabetes education to review readings and adjust insulin dosing. He may need to start using an additional short acting insulin at mealtimes in addition to his current lantus. Otherwise, we may want to consider adding additional medication such as a GLP-1 injectable medication - he was previously not open to this but I'd like him to reconsider.   - kidney function is stable.     Carole Hernández PA-C on 1/8/2025 at 11:19 AM

## 2025-01-08 NOTE — TELEPHONE ENCOUNTER
Called patient and relayed provider's message. He stated that he will think about the GLP-1 medication and either call the clinic or talk to the DM educator with response. He had no further questions. Routing to PCP as RICK MARTINEZ RN  [facility] Triage Team

## 2025-01-08 NOTE — TELEPHONE ENCOUNTER
----- Message from Carole Tran sent at 1/8/2025 11:20 AM CST -----  Please call patient - he does not check MyChart.     - A1c has increased back to 10.1. I would like for him to work on diet and exercise, start checking his blood sugars 3 times daily (fasting in AM, 2 hours after main meal and at bedtime) and follow up with diabetes education to review readings and adjust insulin dosing. He may need to start using an additional short acting insulin at mealtimes in addition to his current lantus. Otherwise, we may want to consider adding additional medication such as a GLP-1 injectable medication - he was previously not open to this but I'd like him to reconsider.   - kidney function is stable.     Carole Hernández PA-C on 1/8/2025 at 11:19 AM

## 2025-04-07 ENCOUNTER — OFFICE VISIT (OUTPATIENT)
Dept: FAMILY MEDICINE | Facility: CLINIC | Age: 50
End: 2025-04-07
Payer: COMMERCIAL

## 2025-04-07 VITALS
SYSTOLIC BLOOD PRESSURE: 139 MMHG | WEIGHT: 315 LBS | TEMPERATURE: 97.6 F | RESPIRATION RATE: 12 BRPM | OXYGEN SATURATION: 99 % | BODY MASS INDEX: 41.75 KG/M2 | DIASTOLIC BLOOD PRESSURE: 87 MMHG | HEART RATE: 114 BPM | HEIGHT: 73 IN

## 2025-04-07 DIAGNOSIS — Z90.5 HISTORY OF RIGHT NEPHRECTOMY: ICD-10-CM

## 2025-04-07 DIAGNOSIS — E11.65 TYPE 2 DIABETES MELLITUS WITH HYPERGLYCEMIA, WITH LONG-TERM CURRENT USE OF INSULIN (H): ICD-10-CM

## 2025-04-07 DIAGNOSIS — F17.210 NICOTINE DEPENDENCE, CIGARETTES, UNCOMPLICATED: ICD-10-CM

## 2025-04-07 DIAGNOSIS — L02.214 ABSCESS OF LEFT GROIN: Primary | ICD-10-CM

## 2025-04-07 DIAGNOSIS — E66.01 MORBID OBESITY (H): ICD-10-CM

## 2025-04-07 DIAGNOSIS — Z87.39 HISTORY OF NECROTIZING FASCIITIS: ICD-10-CM

## 2025-04-07 DIAGNOSIS — Z79.4 TYPE 2 DIABETES MELLITUS WITH HYPERGLYCEMIA, WITH LONG-TERM CURRENT USE OF INSULIN (H): ICD-10-CM

## 2025-04-07 DIAGNOSIS — E11.65 UNCONTROLLED TYPE 2 DIABETES MELLITUS WITH HYPERGLYCEMIA (H): ICD-10-CM

## 2025-04-07 LAB
ANION GAP SERPL CALCULATED.3IONS-SCNC: 11 MMOL/L (ref 7–15)
BASOPHILS # BLD AUTO: 0 10E3/UL (ref 0–0.2)
BASOPHILS NFR BLD AUTO: 0 %
BUN SERPL-MCNC: 18.7 MG/DL (ref 6–20)
CALCIUM SERPL-MCNC: 9 MG/DL (ref 8.8–10.4)
CHLORIDE SERPL-SCNC: 100 MMOL/L (ref 98–107)
CREAT SERPL-MCNC: 1.3 MG/DL (ref 0.67–1.17)
EGFRCR SERPLBLD CKD-EPI 2021: 67 ML/MIN/1.73M2
EOSINOPHIL # BLD AUTO: 0.1 10E3/UL (ref 0–0.7)
EOSINOPHIL NFR BLD AUTO: 1 %
ERYTHROCYTE [DISTWIDTH] IN BLOOD BY AUTOMATED COUNT: 12.7 % (ref 10–15)
EST. AVERAGE GLUCOSE BLD GHB EST-MCNC: 220 MG/DL
GLUCOSE SERPL-MCNC: 187 MG/DL (ref 70–99)
HBA1C MFR BLD: 9.3 % (ref 0–5.6)
HCO3 SERPL-SCNC: 24 MMOL/L (ref 22–29)
HCT VFR BLD AUTO: 48.4 % (ref 40–53)
HGB BLD-MCNC: 16.3 G/DL (ref 13.3–17.7)
IMM GRANULOCYTES # BLD: 0 10E3/UL
IMM GRANULOCYTES NFR BLD: 0 %
LYMPHOCYTES # BLD AUTO: 2.8 10E3/UL (ref 0.8–5.3)
LYMPHOCYTES NFR BLD AUTO: 24 %
MCH RBC QN AUTO: 30 PG (ref 26.5–33)
MCHC RBC AUTO-ENTMCNC: 33.7 G/DL (ref 31.5–36.5)
MCV RBC AUTO: 89 FL (ref 78–100)
MONOCYTES # BLD AUTO: 1 10E3/UL (ref 0–1.3)
MONOCYTES NFR BLD AUTO: 9 %
NEUTROPHILS # BLD AUTO: 7.6 10E3/UL (ref 1.6–8.3)
NEUTROPHILS NFR BLD AUTO: 65 %
PLATELET # BLD AUTO: 198 10E3/UL (ref 150–450)
POTASSIUM SERPL-SCNC: 4.2 MMOL/L (ref 3.4–5.3)
RBC # BLD AUTO: 5.43 10E6/UL (ref 4.4–5.9)
SODIUM SERPL-SCNC: 135 MMOL/L (ref 135–145)
WBC # BLD AUTO: 11.7 10E3/UL (ref 4–11)

## 2025-04-07 PROCEDURE — 3075F SYST BP GE 130 - 139MM HG: CPT | Performed by: NURSE PRACTITIONER

## 2025-04-07 PROCEDURE — 87070 CULTURE OTHR SPECIMN AEROBIC: CPT | Performed by: NURSE PRACTITIONER

## 2025-04-07 PROCEDURE — 36415 COLL VENOUS BLD VENIPUNCTURE: CPT | Performed by: NURSE PRACTITIONER

## 2025-04-07 PROCEDURE — 80048 BASIC METABOLIC PNL TOTAL CA: CPT | Performed by: NURSE PRACTITIONER

## 2025-04-07 PROCEDURE — 83036 HEMOGLOBIN GLYCOSYLATED A1C: CPT | Performed by: NURSE PRACTITIONER

## 2025-04-07 PROCEDURE — 85025 COMPLETE CBC W/AUTO DIFF WBC: CPT | Performed by: NURSE PRACTITIONER

## 2025-04-07 PROCEDURE — 3079F DIAST BP 80-89 MM HG: CPT | Performed by: NURSE PRACTITIONER

## 2025-04-07 PROCEDURE — G2211 COMPLEX E/M VISIT ADD ON: HCPCS | Performed by: NURSE PRACTITIONER

## 2025-04-07 PROCEDURE — 87205 SMEAR GRAM STAIN: CPT | Performed by: NURSE PRACTITIONER

## 2025-04-07 PROCEDURE — 99214 OFFICE O/P EST MOD 30 MIN: CPT | Performed by: NURSE PRACTITIONER

## 2025-04-07 NOTE — PATIENT INSTRUCTIONS
Return to diabetic education to help with medication options and get diabetes under better control.  Strongly recommend continuous glucose monitor usage.    Based on our discussion, I have outlined the following instructions for you:    - Take the prescribed medication, augmentin, and keep an eye on your kidney health while using it.  Recommend against taking antibiotics you have leftover at home as it can foster insulin resistance and can be the wrong dose of antibiotic.  Labs today.  - Come back for a check-up on April 9, 2025, to see how things are going and decide if anything else needs to be done/incision and drainage.  - Use warm cloths on the area to help it drain better.  - Talk about ways to change your lifestyle, like losing weight, to help you feel better and get sick less often.  Recommend a visit with diabetic education as soon as possible.  - Consider talking about ways to quit smoking to help improve your health.    Thank you again for your visit, and we look forward to supporting you in your journey to better health.

## 2025-04-07 NOTE — PROGRESS NOTES
Assessment & Plan     Abscess of left groin    - HEMOGLOBIN A1C  - CBC with platelets and differential  - Basic metabolic panel  (Ca, Cl, CO2, Creat, Gluc, K, Na, BUN)  - Abscess Aerobic Bacterial Culture Routine With Gram Stain  - HEMOGLOBIN A1C  - CBC with platelets and differential  - Basic metabolic panel  (Ca, Cl, CO2, Creat, Gluc, K, Na, BUN)  - Adult Diabetes Education [facility] Referral  - amoxicillin-clavulanate (AUGMENTIN) 875-125 MG tablet  Dispense: 20 tablet; Refill: 0    History of necrotizing fasciitis    - Adult Diabetes Education  Referral    Uncontrolled type 2 diabetes mellitus with hyperglycemia (H)    - Adult Diabetes Education  Referral    Type 2 diabetes mellitus with hyperglycemia, with long-term current use of insulin (H)    - HEMOGLOBIN A1C  - HEMOGLOBIN A1C  - Adult Diabetes Education [facility] Referral    History of right nephrectomy    - Basic metabolic panel  (Ca, Cl, CO2, Creat, Gluc, K, Na, BUN)  - Basic metabolic panel  (Ca, Cl, CO2, Creat, Gluc, K, Na, BUN)  - Adult Diabetes Education [facility] Referral    Morbid obesity (H)    - Adult Diabetes Education  Referral    Nicotine dependence, cigarettes, uncomplicated    - Adult Diabetes Education [facility] Referral        Assessment & Plan  Abscess of left groin:  - Abscess is actively draining on its own, with NO signs of cellulitis starting. History of necrotizing fasciitis from cellulitis noted and if at any point he has any worsening he should proceed to the emergency.  - Prescribe augmentin, monitor kidney function due to antibiotic use. Follow-up scheduled for April 9, 2025, to assess progress and determine if further intervention is needed. Warm compresses recommended to aid drainage.    History of right nephrectomy:  - Kidney function stable but requires monitoring due to antibiotic use.  - Monitor kidney function with labs to ensure no adverse effects from antibiotics.    Morbid obesity:  - Obesity  and uncontrolled diabetes noted as a factor in overall health and recurrent infections.  - Discuss potential lifestyle changes such as smoking cessation, better diabetic control, and weight loss to improve health and reduce infection recurrence.  Referral sent for diabetic education    Nicotine dependence, cigarettes, uncomplicated:  - Smoking noted as a factor in overall health.  - Discuss potential smoking cessation to improve health.      Return in about 2 days (around 4/9/2025).     Rosario Angulo is a 49 year old, presenting for the following health issues:  Derm Problem (Boil)    Verbal consent was obtained from the patient to use an AI scribe/documentation tool in the creation of this note.This note/dictation was performed and completed with the assistance of voice recognition software and an AI scribe. It may contain inadvertant transcription  errors,  omissions and/or  inadvertent word substitution.           4/7/2025    10:11 AM   Additional Questions   Roomed by Kendrick VALLE   Accompanied by Self     History of Present Illness       Reason for visit:  Boil on thigh    He eats 2-3 servings of fruits and vegetables daily.He consumes 2 sweetened beverage(s) daily.He exercises with enough effort to increase his heart rate 10 to 19 minutes per day.  He exercises with enough effort to increase his heart rate 3 or less days per week.   He is taking medications regularly.   - Kev Gilbert previously saw general surgery after an ER visit for a boil.  - Reports recurrent boils.  Uncontrolled diabetes takes Lantus insulin only.  Has been reluctant to start GLP-1  Hemoglobin A1C   Date Value Ref Range Status   01/06/2025 10.1 (H) 0.0 - 5.6 % Final     Comment:     Normal <5.7%   Prediabetes 5.7-6.4%    Diabetes 6.5% or higher     Note: Adopted from ADA consensus guidelines.   05/13/2024 9.7 (H) 0.0 - 5.6 % Final     Comment:     Normal <5.7%   Prediabetes 5.7-6.4%    Diabetes 6.5% or higher     Note: Adopted from ADA  "consensus guidelines.   02/19/2024 10.1 (H) 0.0 - 5.6 % Final     Comment:     Normal <5.7%   Prediabetes 5.7-6.4%    Diabetes 6.5% or higher     Note: Adopted from ADA consensus guidelines.      - Has a history of necrotizing fasciitis 2 years ago, which started as cellulitis and progressed rapidly within 4 days, requiring ER intervention.  - Experienced a boil on the right thigh about a month to a month and a half ago, treated with leftover clindamycin for 5 or 6 days, which resolved the issue.  - Has a history of high white blood cell count during previous boil episodes.  - Kidney function has been stable for the past year, with a creatinine level of 1.4 as of January.  History of malignancy of right kidney post right nephrectomy.  - Expresses concern about the recurrence of boils and the potential for severe infections.    Concern -  Boil on thigh  Onset: x4 days  Description: left inner thigh - near groin  Intensity: moderate, severe-8/10 as sitting down  Progression of Symptoms:  worsening - increased in size  Accompanying Signs & Symptoms: cannot see where the location is  Previous history of similar problem: has had many in past - different areas on legs - every few moths get them  Precipitating factors:        Worsened by: pressure, rubbing, walking  Alleviating factors:        Improved by: nothing  Therapies tried and outcome: bandage with bacitracin, warm wash cloth - makes more painful            Review of Systems  Constitutional, neuro, ENT, endocrine, pulmonary, cardiac, gastrointestinal, genitourinary, musculoskeletal, integument and psychiatric systems are negative, except as otherwise noted.      Objective    /87 (BP Location: Right arm, Patient Position: Chair, Cuff Size: Adult Large)   Pulse 114   Temp 97.6  F (36.4  C) (Tympanic)   Resp 12   Ht 1.85 m (6' 0.84\")   Wt (!) 157.4 kg (347 lb)   SpO2 99%   BMI 45.98 kg/m    Body mass index is 45.98 kg/m .  Physical Exam       Physical " Exam- SKIN; left thigh approximately 5 cm from groin area; noted actively draining foul-smelling purulent drainage and erythema abscess without  surrounding cellulitis     Signed Electronically by: LEROY Maurer CNP

## 2025-04-07 NOTE — RESULT ENCOUNTER NOTE
Note to Staff: please call the patient to explain results.    Patient does have an improvement of his A1c almost by a whole point down to 9.3.  I sent an urgent referral to diabetic education to help him get additional medications on board and continuous glucose monitor for improvement of his sugars.  His white cell count is mildly elevated at 11.7. With his known infection on his inner thigh he needs to keep very close watch of this and if he has any change in the way he feels or it moves to a cellulitis instead of just the area abscess he needs to be at the emergency room immediately.  History of necrotizing fasciitis.  His kidney function is still pending at this time.      Otilia Walter, BEREKETP-BC

## 2025-04-08 NOTE — RESULT ENCOUNTER NOTE
Dear Kev,    Here is a summary of your recent test results:    Kidney function is stable/looks good at 1.3.       For additional lab test information, labtestsonline.org is an excellent reference.    In addition, here is a list of due or overdue Health Maintenance reminders:    Eye Exam Never done  Yearly Preventive Visit Never done  Colorectal Cancer Screening Never done  Pneumococcal Vaccine(1 of 2 - PCV) Never done  Zoster (Shingles) Vaccine(1 of 2) Never done  Diabetic Foot Exam due on 04/11/2024  Flu Vaccine(1) Never done  COVID-19 Vaccine(1 - 2024-25 season) Never done  Cholesterol Lab due on 04/24/2025  Kidney Microalbumin Urine Test due on 04/24/2025  LUNG CANCER SCREENING due on 04/27/2025    Please call us at [phone] (or use Tapp[employer]) to address the above recommendations if needed.    Thank you for choosing [facility].  It was an honor and a privilege to participate in your care.       Healthy regards,    Otilia Walter, JULIUS  [facility]

## 2025-04-09 ENCOUNTER — OFFICE VISIT (OUTPATIENT)
Dept: FAMILY MEDICINE | Facility: CLINIC | Age: 50
End: 2025-04-09
Payer: COMMERCIAL

## 2025-04-09 VITALS
OXYGEN SATURATION: 98 % | HEART RATE: 107 BPM | TEMPERATURE: 97.3 F | WEIGHT: 315 LBS | RESPIRATION RATE: 13 BRPM | BODY MASS INDEX: 41.75 KG/M2 | DIASTOLIC BLOOD PRESSURE: 80 MMHG | HEIGHT: 73 IN | SYSTOLIC BLOOD PRESSURE: 126 MMHG

## 2025-04-09 DIAGNOSIS — L72.9 INFECTED CYST OF SKIN: ICD-10-CM

## 2025-04-09 DIAGNOSIS — E11.65 UNCONTROLLED TYPE 2 DIABETES MELLITUS WITH HYPERGLYCEMIA (H): ICD-10-CM

## 2025-04-09 DIAGNOSIS — Z90.5 H/O RIGHT NEPHRECTOMY: ICD-10-CM

## 2025-04-09 DIAGNOSIS — Z87.39 HISTORY OF NECROTIZING FASCIITIS: ICD-10-CM

## 2025-04-09 DIAGNOSIS — F17.210 NICOTINE DEPENDENCE, CIGARETTES, UNCOMPLICATED: ICD-10-CM

## 2025-04-09 DIAGNOSIS — Z85.528 HISTORY OF PRIMARY MALIGNANT NEOPLASM OF RIGHT KIDNEY: ICD-10-CM

## 2025-04-09 DIAGNOSIS — L02.214 ABSCESS OF LEFT GROIN: Primary | ICD-10-CM

## 2025-04-09 DIAGNOSIS — L08.9 INFECTED CYST OF SKIN: ICD-10-CM

## 2025-04-09 PROCEDURE — 3074F SYST BP LT 130 MM HG: CPT | Performed by: NURSE PRACTITIONER

## 2025-04-09 PROCEDURE — 3079F DIAST BP 80-89 MM HG: CPT | Performed by: NURSE PRACTITIONER

## 2025-04-09 PROCEDURE — 99213 OFFICE O/P EST LOW 20 MIN: CPT | Performed by: NURSE PRACTITIONER

## 2025-04-09 NOTE — PROGRESS NOTES
Assessment & Plan     Abscess of left groin  Infected cyst of skin  History of necrotizing fasciitis  Uncontrolled type 2 diabetes mellitus with hyperglycemia (H)  Nicotine dependence, cigarettes, uncomplicated  History of primary malignant neoplasm of right kidney  H/O right nephrectomy  Significant improvement of the localized area of infected cyst versus abscess however underlying firm area is quite large.   Skin culture is still pending but not growing any concerning bacteria on preliminary results.  Complete antibiotics in their entirety.  Strongly recommend GEN surge referral for possible excision of an underlying cyst in the area that we will continue to have risk for repeated infection.  Strongly recommend control of diabetes; has upcoming diabetic ED appt.   Strongly recommend smoking cessation he declines ability to do this at this time as he is not ready.  He can reach out to us anytime to help with this.  - Adult Gen Surg  Referral    Return in about 1 week (around 4/16/2025) for Recheck, if symptoms persist or worsening please be seen.     Rosario Angulo is a 49 year old, presenting for the following health issues:  Derm Problem (Recheck boil)        4/9/2025     9:43 AM   Additional Questions   Roomed by Kendrick VALLE   Accompanied by Self     HPI      LOV 04/07/2025 -- Boil left inner tight/groin area.  Thinks burst yesterday - had a mess on underwear.     Pain is a little better than before.   Has bandage on it currently.     Hemoglobin A1C   Date Value Ref Range Status   04/07/2025 9.3 (H) 0.0 - 5.6 % Final     Comment:     Normal <5.7%   Prediabetes 5.7-6.4%    Diabetes 6.5% or higher     Note: Adopted from ADA consensus guidelines.   01/06/2025 10.1 (H) 0.0 - 5.6 % Final     Comment:     Normal <5.7%   Prediabetes 5.7-6.4%    Diabetes 6.5% or higher     Note: Adopted from ADA consensus guidelines.   05/13/2024 9.7 (H) 0.0 - 5.6 % Final     Comment:     Normal <5.7%   Prediabetes  "5.7-6.4%    Diabetes 6.5% or higher     Note: Adopted from ADA consensus guidelines.          Review of Systems  Constitutional, neuro, ENT, endocrine, pulmonary, cardiac, gastrointestinal, genitourinary, musculoskeletal, integument and psychiatric systems are negative, except as otherwise noted.      Objective    /80 (BP Location: Right arm, Patient Position: Chair, Cuff Size: Adult Large)   Pulse 107   Temp 97.3  F (36.3  C) (Tympanic)   Resp 13   Ht 1.85 m (6' 0.84\")   Wt (!) 158.3 kg (349 lb)   SpO2 98%   BMI 46.25 kg/m    Body mass index is 46.25 kg/m .  Physical Exam   GENERAL: alert and no distress  SKIN; left thigh approximately 5 cm from groin area;.  There was a significant improvement of localized erythema noted actively draining minimal bloody drainage however the circumference of deeper firmness is approximately 9 X 6 CM. NO warm or surrounding cellulitis          Signed Electronically by: LEROY Maurer CNP    "

## 2025-04-10 LAB
BACTERIA ABSC ANAEROBE+AEROBE CULT: ABNORMAL
BACTERIA ABSC ANAEROBE+AEROBE CULT: ABNORMAL
GRAM STAIN RESULT: ABNORMAL

## 2025-05-12 ENCOUNTER — OFFICE VISIT (OUTPATIENT)
Dept: FAMILY MEDICINE | Facility: CLINIC | Age: 50
End: 2025-05-12
Payer: COMMERCIAL

## 2025-05-12 VITALS
HEART RATE: 95 BPM | SYSTOLIC BLOOD PRESSURE: 130 MMHG | BODY MASS INDEX: 42.66 KG/M2 | OXYGEN SATURATION: 96 % | DIASTOLIC BLOOD PRESSURE: 84 MMHG | TEMPERATURE: 97.5 F | HEIGHT: 72 IN | WEIGHT: 315 LBS | RESPIRATION RATE: 19 BRPM

## 2025-05-12 DIAGNOSIS — E11.65 TYPE 2 DIABETES MELLITUS WITH HYPERGLYCEMIA, WITH LONG-TERM CURRENT USE OF INSULIN (H): Primary | ICD-10-CM

## 2025-05-12 DIAGNOSIS — Z12.11 SCREEN FOR COLON CANCER: ICD-10-CM

## 2025-05-12 DIAGNOSIS — Z85.528 HISTORY OF PRIMARY MALIGNANT NEOPLASM OF RIGHT KIDNEY: ICD-10-CM

## 2025-05-12 DIAGNOSIS — Z79.4 TYPE 2 DIABETES MELLITUS WITH HYPERGLYCEMIA, WITH LONG-TERM CURRENT USE OF INSULIN (H): Primary | ICD-10-CM

## 2025-05-12 DIAGNOSIS — F41.1 GENERALIZED ANXIETY DISORDER: ICD-10-CM

## 2025-05-12 DIAGNOSIS — E66.01 MORBID OBESITY (H): ICD-10-CM

## 2025-05-12 DIAGNOSIS — Z90.5 H/O RIGHT NEPHRECTOMY: ICD-10-CM

## 2025-05-12 PROCEDURE — 1126F AMNT PAIN NOTED NONE PRSNT: CPT | Performed by: PHYSICIAN ASSISTANT

## 2025-05-12 PROCEDURE — 3079F DIAST BP 80-89 MM HG: CPT | Performed by: PHYSICIAN ASSISTANT

## 2025-05-12 PROCEDURE — G2211 COMPLEX E/M VISIT ADD ON: HCPCS | Performed by: PHYSICIAN ASSISTANT

## 2025-05-12 PROCEDURE — 3075F SYST BP GE 130 - 139MM HG: CPT | Performed by: PHYSICIAN ASSISTANT

## 2025-05-12 PROCEDURE — 99214 OFFICE O/P EST MOD 30 MIN: CPT | Performed by: PHYSICIAN ASSISTANT

## 2025-05-12 ASSESSMENT — ANXIETY QUESTIONNAIRES
6. BECOMING EASILY ANNOYED OR IRRITABLE: SEVERAL DAYS
5. BEING SO RESTLESS THAT IT IS HARD TO SIT STILL: NOT AT ALL
2. NOT BEING ABLE TO STOP OR CONTROL WORRYING: NOT AT ALL
1. FEELING NERVOUS, ANXIOUS, OR ON EDGE: NOT AT ALL
7. FEELING AFRAID AS IF SOMETHING AWFUL MIGHT HAPPEN: NOT AT ALL
IF YOU CHECKED OFF ANY PROBLEMS ON THIS QUESTIONNAIRE, HOW DIFFICULT HAVE THESE PROBLEMS MADE IT FOR YOU TO DO YOUR WORK, TAKE CARE OF THINGS AT HOME, OR GET ALONG WITH OTHER PEOPLE: SOMEWHAT DIFFICULT
3. WORRYING TOO MUCH ABOUT DIFFERENT THINGS: NOT AT ALL
GAD7 TOTAL SCORE: 2
GAD7 TOTAL SCORE: 2

## 2025-05-12 ASSESSMENT — PAIN SCALES - GENERAL: PAINLEVEL_OUTOF10: NO PAIN (0)

## 2025-05-12 ASSESSMENT — PATIENT HEALTH QUESTIONNAIRE - PHQ9
5. POOR APPETITE OR OVEREATING: SEVERAL DAYS
SUM OF ALL RESPONSES TO PHQ QUESTIONS 1-9: 3

## 2025-05-12 NOTE — PROGRESS NOTES
Assessment & Plan     Type 2 diabetes mellitus with hyperglycemia, with long-term current use of insulin (H)  Uncontrolled, last A1c last month was improved but remaining above goal. Reported home fasting glucose readings remain above goal. Patient now open to starting ozempic - discussed common side effects, risk/benefit. Will start with 0.25 mg weekly x4 weeks followed by 0.5 mg weekly and continue. Follow up in 2 months for recheck of labs and weight  - Albumin Random Urine Quantitative with Creat Ratio  - semaglutide (OZEMPIC) 2 MG/3ML pen  Dispense: 9 mL; Refill: 0    Morbid obesity (H)  As above.   - semaglutide (OZEMPIC) 2 MG/3ML pen  Dispense: 9 mL; Refill: 0    Generalized anxiety disorder  Stable on current regimen. Does not need refills today.    History of primary malignant neoplasm of right kidney  H/O right nephrectomy  Follows with oncology. Has follow up scan today and will be seeing oncology soon as well. Kidney function has been stable.     Screen for colon cancer  Has col[employer] Pharmaceuticalsuard at home that was sent to him in the fall. He will collect sample and send back.     Follow up 2 months - check lipids, CMP, ,microalbumin and A1c    The longitudinal plan of care for the diagnosis(es)/condition(s) as documented were addressed during this visit. Due to the added complexity in care, I will continue to support Kev in the subsequent management and with ongoing continuity of care.     Carole Hernández PA-C on 5/12/2025 at 7:33 AM      Rosario Angulo is a 49 year old, presenting for the following health issues:  Medication Follow-up (Diabetes and mood disorder. )        5/12/2025     7:25 AM   Additional Questions   Roomed by Swapna BABCOCK     History of Present Illness       Diabetes:   He presents for follow up of diabetes.  He is checking home blood glucose one time daily.   He checks blood glucose before meals.  Blood glucose is sometimes over 200 and never under 70.  When his blood glucose is low,  the patient is asymptomatic for confusion, blurred vision, lethargy and reports not feeling dizzy, shaky, or weak.  He is concerned about frequent infections and blood sugar frequently over 200.   He is having excessive thirst.  The patient has not had a diabetic eye exam in the last 12 months.          He eats 2-3 servings of fruits and vegetables daily.He consumes 1 sweetened beverage(s) daily.He exercises with enough effort to increase his heart rate 9 or less minutes per day.  He exercises with enough effort to increase his heart rate 3 or less days per week.   He is taking medications regularly.        Diabetes  Lantus 45u BID   Did not start jardiance due to history of necrotizing fasciitis   Has be hesitant to use GLP-1 until recently - friends are on it and doing well   Has been referred to DM education multiple times but has not followed through   Fasting BG all over the place 130-230, average 170-180s  Trying to eat more protein at dinner but problem is sugar cravings late at night     Mood is stable on effexor  Does not feel changes need to be made      RCC s/p right nephrectomy 3/12/24  Following [facility]  CT 9/2024 showed no evidence of recurrence or mets. Did note fatty liver      Depression and Anxiety   How are you doing with your depression since your last visit? No change  How are you doing with your anxiety since your last visit?  No change  Are you having other symptoms that might be associated with depression or anxiety? No  Have you had a significant life event? No   Do you have any concerns with your use of alcohol or other drugs? No    Social History     Tobacco Use    Smoking status: Every Day     Current packs/day: 1.50     Average packs/day: 1.5 packs/day for 40.4 years (60.5 ttl pk-yrs)     Types: Cigarettes     Start date: 1/1/1985    Smokeless tobacco: Never   Vaping Use    Vaping status: Never Used   Substance Use Topics    Alcohol use: No     Alcohol/week: 0.0  "standard drinks of alcohol    Drug use: No         9/6/2019    11:13 AM 9/17/2020     8:14 AM 5/12/2025     7:28 AM   PHQ   PHQ-9 Total Score 6 0 3   Q9: Thoughts of better off dead/self-harm past 2 weeks Not at all  Not at all Not at all       Data saved with a previous flowsheet row definition         9/6/2019    11:13 AM 9/17/2020     8:14 AM 5/12/2025     7:28 AM   MATTY-7 SCORE   Total Score 7 0 2         5/12/2025     7:28 AM   Last PHQ-9   1.  Little interest or pleasure in doing things 0   2.  Feeling down, depressed, or hopeless 0   3.  Trouble falling or staying asleep, or sleeping too much 1   4.  Feeling tired or having little energy 1   5.  Poor appetite or overeating 1   6.  Feeling bad about yourself 0   7.  Trouble concentrating 0   8.  Moving slowly or restless 0   Q9: Thoughts of better off dead/self-harm past 2 weeks 0   PHQ-9 Total Score 3   Difficulty at work, home, or with people Somewhat difficult         5/12/2025     7:28 AM   MATTY-7    1. Feeling nervous, anxious, or on edge 0   2. Not being able to stop or control worrying 0   3. Worrying too much about different things 0   4. Trouble relaxing 1   5. Being so restless that it is hard to sit still 0   6. Becoming easily annoyed or irritable 1   7. Feeling afraid, as if something awful might happen 0   MATTY-7 Total Score 2   If you checked any problems, how difficult have they made it for you to do your work, take care of things at home, or get along with other people? Somewhat difficult         Objective    /84   Pulse 95   Temp 97.5  F (36.4  C) (Temporal)   Resp 19   Ht 1.84 m (6' 0.44\")   Wt (!) 159.5 kg (351 lb 9.6 oz)   SpO2 96%   BMI 47.11 kg/m    Body mass index is 47.11 kg/m .  Physical Exam   GENERAL: alert and no distress  RESP: lungs clear to auscultation - no rales, rhonchi or wheezes  CV: regular rate and rhythm, normal S1 S2, no S3 or S4, no murmur, click or rub, no peripheral edema  NEURO: Normal strength and tone, " mentation intact and speech normal  PSYCH: mentation appears normal, affect normal/bright            Signed Electronically by: Carole Hernández PA-C

## 2025-06-12 ENCOUNTER — PATIENT OUTREACH (OUTPATIENT)
Dept: CARE COORDINATION | Facility: CLINIC | Age: 50
End: 2025-06-12
Payer: COMMERCIAL

## 2025-06-26 ENCOUNTER — TELEPHONE (OUTPATIENT)
Dept: FAMILY MEDICINE | Facility: CLINIC | Age: 50
End: 2025-06-26
Payer: COMMERCIAL

## 2025-06-26 DIAGNOSIS — L02.92 BOIL: Primary | ICD-10-CM

## 2025-06-26 NOTE — TELEPHONE ENCOUNTER
Yes, needs visit. Was supposed to see surgeon but hasn't - also needs to schedule this appointment.   Carole Hernández PA-C on 6/26/2025 at 7:16 AM

## 2025-06-26 NOTE — TELEPHONE ENCOUNTER
Called patient and relayed provider's message. Offered patient the same day appointment on 6/27. He stated that he can not do that appointment due to having to work. Offered patient an appointment on Monday and he stated that he would be in the ED. Patient is requesting an antibiotic be sent into the pharmacy. He stated that if the antibiotic is sent in to the pharmacy today he would be okay with doing an appointment on Monday. Routing to provider to review and advise.     Roselia MARTINEZ RN  [facility] Triage Team

## 2025-06-26 NOTE — TELEPHONE ENCOUNTER
Can we get a little more info on his symptoms? I know he is well versed in this as it is a common problem for him, but would be helpful to know what current symptoms are/what it looks and feels like.   Carole Hernández PA-C on 6/26/2025 at 12:49 PM

## 2025-06-26 NOTE — TELEPHONE ENCOUNTER
Called patient and relayed provider's message. He stated understanding and had no further questions.     Roselia MARTINEZ RN  [facility] Triage Team

## 2025-06-26 NOTE — TELEPHONE ENCOUNTER
Is it okay to use a same day appointment?     Roselia MARTINEZ RN  [facility] Triage Team

## 2025-06-26 NOTE — TELEPHONE ENCOUNTER
Called patient for further information. He stated that the boil of concern is on his inner thigh. He stated that it has been doubling in size every day. He is unable to see it so is unsure if it is red and he was unsure if it is warm to the touch. He stated that he is not running a fever. He did state that the area is painful and rated the pain 5/10. He stated that the pain is constant. He denies having any discharge from the boil.     Patient stated that this is similar to what happened last time when it was infected. He stated that it will continue to double in size until he is no longer able to walk.     Routing to provider to review and advises.     Roselia MARTINEZ RN  [facility] Triage Team

## 2025-06-26 NOTE — TELEPHONE ENCOUNTER
Given this is a reoccurring issue for him and I know he is well versed in symptoms, I will make an exception and send in prescription for augmentin BID x10 days - this is what he was prescribed last time. In the future, really should be evaluated before sending prescription.   Carole Hernández PA-C on 6/26/2025 at 1:59 PM

## 2025-06-26 NOTE — TELEPHONE ENCOUNTER
Pt states he gets recurring boils on thighs. Doxy or Augmentin pt states is what he usually gets per provider discretion, typically alternates.     Pt states they recur every few months and provider will send abx before it turns into an abscess that needs ER evaluation.    Please review and advise, visit needed?    Tatiana Vasquez RN

## 2025-07-12 ENCOUNTER — HEALTH MAINTENANCE LETTER (OUTPATIENT)
Age: 50
End: 2025-07-12

## 2025-08-12 ENCOUNTER — RESULTS FOLLOW-UP (OUTPATIENT)
Dept: FAMILY MEDICINE | Facility: CLINIC | Age: 50
End: 2025-08-12

## 2025-08-12 ENCOUNTER — OFFICE VISIT (OUTPATIENT)
Dept: FAMILY MEDICINE | Facility: CLINIC | Age: 50
End: 2025-08-12
Payer: COMMERCIAL

## 2025-08-12 VITALS
HEART RATE: 97 BPM | TEMPERATURE: 97.6 F | BODY MASS INDEX: 42.66 KG/M2 | RESPIRATION RATE: 20 BRPM | WEIGHT: 315 LBS | DIASTOLIC BLOOD PRESSURE: 88 MMHG | SYSTOLIC BLOOD PRESSURE: 132 MMHG | HEIGHT: 72 IN | OXYGEN SATURATION: 97 %

## 2025-08-12 DIAGNOSIS — Z12.5 ENCOUNTER FOR PROSTATE CANCER SCREENING: ICD-10-CM

## 2025-08-12 DIAGNOSIS — E66.01 MORBID OBESITY (H): ICD-10-CM

## 2025-08-12 DIAGNOSIS — F17.210 NICOTINE DEPENDENCE, CIGARETTES, UNCOMPLICATED: ICD-10-CM

## 2025-08-12 DIAGNOSIS — L72.9 INFECTED CYST OF SKIN: ICD-10-CM

## 2025-08-12 DIAGNOSIS — Z90.5 H/O RIGHT NEPHRECTOMY: ICD-10-CM

## 2025-08-12 DIAGNOSIS — E11.65 TYPE 2 DIABETES MELLITUS WITH HYPERGLYCEMIA, WITH LONG-TERM CURRENT USE OF INSULIN (H): ICD-10-CM

## 2025-08-12 DIAGNOSIS — Z13.21 ENCOUNTER FOR VITAMIN DEFICIENCY SCREENING: ICD-10-CM

## 2025-08-12 DIAGNOSIS — E78.5 DYSLIPIDEMIA: ICD-10-CM

## 2025-08-12 DIAGNOSIS — Z85.528 HISTORY OF PRIMARY MALIGNANT NEOPLASM OF RIGHT KIDNEY: ICD-10-CM

## 2025-08-12 DIAGNOSIS — Z00.00 ROUTINE GENERAL MEDICAL EXAMINATION AT A HEALTH CARE FACILITY: Primary | ICD-10-CM

## 2025-08-12 DIAGNOSIS — E78.1 HYPERTRIGLYCERIDEMIA: ICD-10-CM

## 2025-08-12 DIAGNOSIS — N18.31 STAGE 3A CHRONIC KIDNEY DISEASE (H): ICD-10-CM

## 2025-08-12 DIAGNOSIS — Z79.4 TYPE 2 DIABETES MELLITUS WITH HYPERGLYCEMIA, WITH LONG-TERM CURRENT USE OF INSULIN (H): ICD-10-CM

## 2025-08-12 DIAGNOSIS — L08.9 INFECTED CYST OF SKIN: ICD-10-CM

## 2025-08-12 DIAGNOSIS — B99.9 RECURRENT INFECTIONS: ICD-10-CM

## 2025-08-12 DIAGNOSIS — G47.33 OSA (OBSTRUCTIVE SLEEP APNEA): ICD-10-CM

## 2025-08-12 DIAGNOSIS — L02.214 ABSCESS OF GROIN, LEFT: ICD-10-CM

## 2025-08-12 DIAGNOSIS — Z12.11 SCREEN FOR COLON CANCER: ICD-10-CM

## 2025-08-12 PROBLEM — K76.0 STEATOSIS OF LIVER: Status: ACTIVE | Noted: 2025-08-12

## 2025-08-12 PROBLEM — D72.828 OTHER ELEVATED WHITE BLOOD CELL COUNT: Status: ACTIVE | Noted: 2025-08-12

## 2025-08-12 PROBLEM — F17.218 NICOTINE DEPENDENCE, CIGARETTES, WITH OTHER NICOTINE-INDUCED DISORDERS: Status: ACTIVE | Noted: 2023-02-11

## 2025-08-12 PROBLEM — L02.416 CUTANEOUS ABSCESS OF LEFT LOWER LIMB: Status: ACTIVE | Noted: 2025-08-12

## 2025-08-12 PROBLEM — N18.9 CHRONIC KIDNEY DISEASE: Status: ACTIVE | Noted: 2025-08-12

## 2025-08-12 LAB
ERYTHROCYTE [DISTWIDTH] IN BLOOD BY AUTOMATED COUNT: 13 % (ref 10–15)
EST. AVERAGE GLUCOSE BLD GHB EST-MCNC: 223 MG/DL
HBA1C MFR BLD: 9.4 % (ref 0–5.6)
HCT VFR BLD AUTO: 48.8 % (ref 40–53)
HGB BLD-MCNC: 16.1 G/DL (ref 13.3–17.7)
MCH RBC QN AUTO: 29.7 PG (ref 26.5–33)
MCHC RBC AUTO-ENTMCNC: 33 G/DL (ref 31.5–36.5)
MCV RBC AUTO: 90 FL (ref 78–100)
PLATELET # BLD AUTO: 200 10E3/UL (ref 150–450)
RBC # BLD AUTO: 5.42 10E6/UL (ref 4.4–5.9)
WBC # BLD AUTO: 9.76 10E3/UL (ref 4–11)

## 2025-08-12 RX ORDER — DOXYCYCLINE HYCLATE 100 MG
100 TABLET ORAL
COMMUNITY
Start: 2025-01-09

## 2025-08-12 RX ORDER — GLIPIZIDE 5 MG/1
5 TABLET, FILM COATED, EXTENDED RELEASE ORAL DAILY
Qty: 90 TABLET | Refills: 1 | Status: SHIPPED | OUTPATIENT
Start: 2025-08-12

## 2025-08-12 RX ORDER — DOXYCYCLINE HYCLATE 100 MG
100 TABLET ORAL 2 TIMES DAILY
Qty: 20 TABLET | Refills: 0 | Status: SHIPPED | OUTPATIENT
Start: 2025-08-12

## 2025-08-12 ASSESSMENT — PAIN SCALES - GENERAL: PAINLEVEL_OUTOF10: SEVERE PAIN (8)

## 2025-08-13 ENCOUNTER — PATIENT OUTREACH (OUTPATIENT)
Dept: CARE COORDINATION | Facility: CLINIC | Age: 50
End: 2025-08-13
Payer: COMMERCIAL

## 2025-08-13 LAB
ALBUMIN SERPL BCG-MCNC: 4.2 G/DL (ref 3.5–5.2)
ALP SERPL-CCNC: 107 U/L (ref 40–150)
ALT SERPL W P-5'-P-CCNC: 26 U/L (ref 0–70)
ANION GAP SERPL CALCULATED.3IONS-SCNC: 13 MMOL/L (ref 7–15)
AST SERPL W P-5'-P-CCNC: 20 U/L (ref 0–45)
BILIRUB SERPL-MCNC: 0.3 MG/DL
BUN SERPL-MCNC: 18.3 MG/DL (ref 6–20)
CALCIUM SERPL-MCNC: 9 MG/DL (ref 8.8–10.4)
CHLORIDE SERPL-SCNC: 102 MMOL/L (ref 98–107)
CREAT SERPL-MCNC: 1.34 MG/DL (ref 0.67–1.17)
EGFRCR SERPLBLD CKD-EPI 2021: 64 ML/MIN/1.73M2
GLUCOSE SERPL-MCNC: 194 MG/DL (ref 70–99)
HCO3 SERPL-SCNC: 23 MMOL/L (ref 22–29)
POTASSIUM SERPL-SCNC: 4.2 MMOL/L (ref 3.4–5.3)
PROT SERPL-MCNC: 6.9 G/DL (ref 6.4–8.3)
PSA SERPL DL<=0.01 NG/ML-MCNC: 0.47 NG/ML (ref 0–3.5)
SODIUM SERPL-SCNC: 138 MMOL/L (ref 135–145)
TSH SERPL DL<=0.005 MIU/L-ACNC: 1.87 UIU/ML (ref 0.3–4.2)
VIT D+METAB SERPL-MCNC: 24 NG/ML (ref 20–50)

## 2025-08-18 ENCOUNTER — PATIENT OUTREACH (OUTPATIENT)
Dept: GASTROENTEROLOGY | Facility: CLINIC | Age: 50
End: 2025-08-18
Payer: COMMERCIAL

## 2025-08-18 DIAGNOSIS — Z12.11 SPECIAL SCREENING FOR MALIGNANT NEOPLASMS, COLON: Primary | ICD-10-CM

## 2025-08-18 LAB — HEMOCCULT STL QL IA: POSITIVE

## 2025-08-19 ENCOUNTER — OFFICE VISIT (OUTPATIENT)
Dept: FAMILY MEDICINE | Facility: CLINIC | Age: 50
End: 2025-08-19
Payer: COMMERCIAL

## 2025-08-19 VITALS
HEART RATE: 101 BPM | DIASTOLIC BLOOD PRESSURE: 88 MMHG | SYSTOLIC BLOOD PRESSURE: 128 MMHG | OXYGEN SATURATION: 95 % | TEMPERATURE: 97.1 F | BODY MASS INDEX: 46.13 KG/M2 | RESPIRATION RATE: 20 BRPM | WEIGHT: 315 LBS

## 2025-08-19 DIAGNOSIS — Z79.4 TYPE 2 DIABETES MELLITUS WITH HYPERGLYCEMIA, WITH LONG-TERM CURRENT USE OF INSULIN (H): ICD-10-CM

## 2025-08-19 DIAGNOSIS — L02.416 CUTANEOUS ABSCESS OF LEFT LOWER EXTREMITY: Primary | ICD-10-CM

## 2025-08-19 DIAGNOSIS — E11.65 TYPE 2 DIABETES MELLITUS WITH HYPERGLYCEMIA, WITH LONG-TERM CURRENT USE OF INSULIN (H): ICD-10-CM

## 2025-08-19 PROCEDURE — 99214 OFFICE O/P EST MOD 30 MIN: CPT | Mod: 25 | Performed by: NURSE PRACTITIONER

## 2025-08-19 PROCEDURE — 3079F DIAST BP 80-89 MM HG: CPT | Performed by: NURSE PRACTITIONER

## 2025-08-19 PROCEDURE — 3074F SYST BP LT 130 MM HG: CPT | Performed by: NURSE PRACTITIONER

## 2025-08-19 PROCEDURE — 10060 I&D ABSCESS SIMPLE/SINGLE: CPT | Performed by: NURSE PRACTITIONER

## 2025-08-19 RX ORDER — GLUCOSAMINE HCL/CHONDROITIN SU 500-400 MG
CAPSULE ORAL
Qty: 100 EACH | Refills: 3 | Status: SHIPPED | OUTPATIENT
Start: 2025-08-19

## 2025-08-19 RX ORDER — DOXYCYCLINE 100 MG/1
100 CAPSULE ORAL 2 TIMES DAILY
Qty: 20 CAPSULE | Refills: 0 | Status: SHIPPED | OUTPATIENT
Start: 2025-08-19 | End: 2025-08-29

## 2025-08-19 RX ORDER — INSULIN ASPART 100 [IU]/ML
INJECTION, SOLUTION INTRAVENOUS; SUBCUTANEOUS
Qty: 15 ML | Refills: 3 | Status: SHIPPED | OUTPATIENT
Start: 2025-08-19

## 2025-08-19 RX ORDER — PEN NEEDLE, DIABETIC 32GX 5/32"
NEEDLE, DISPOSABLE MISCELLANEOUS
Qty: 100 EACH | Refills: 3 | Status: SHIPPED | OUTPATIENT
Start: 2025-08-19